# Patient Record
Sex: FEMALE | Race: WHITE | NOT HISPANIC OR LATINO | Employment: OTHER | URBAN - METROPOLITAN AREA
[De-identification: names, ages, dates, MRNs, and addresses within clinical notes are randomized per-mention and may not be internally consistent; named-entity substitution may affect disease eponyms.]

---

## 2017-01-03 ENCOUNTER — CONVERSION ENCOUNTER (OUTPATIENT)
Dept: OTHER | Facility: HOSPITAL | Age: 67
End: 2017-01-03

## 2017-03-20 ENCOUNTER — HOSPITAL ENCOUNTER (OUTPATIENT)
Dept: OTHER | Facility: HOSPITAL | Age: 67
Setting detail: SPECIMEN
Discharge: HOME | End: 2017-03-20
Attending: FAMILY MEDICINE | Admitting: FAMILY MEDICINE

## 2017-03-20 LAB
ALBUMIN SERPL-MCNC: 4.7 G/DL (ref 3.5–4.8)
ALBUMIN/GLOB SERPL: 1.4 {RATIO} (ref 1–1.7)
ALP SERPL-CCNC: 91 IU/L (ref 32–91)
ALT SERPL-CCNC: 58 IU/L (ref 14–54)
ANION GAP SERPL CALC-SCNC: 16.3 MMOL/L (ref 10–20)
AST SERPL-CCNC: 32 IU/L (ref 15–41)
BASOPHILS # BLD AUTO: 0.1 10*3/UL (ref 0–0.2)
BASOPHILS NFR BLD AUTO: 1 % (ref 0–2)
BILIRUB SERPL-MCNC: 0.7 MG/DL (ref 0.3–1.2)
BUN SERPL-MCNC: 12 MG/DL (ref 8–20)
BUN/CREAT SERPL: 17.1 (ref 5.4–26.2)
CALCIUM SERPL-MCNC: 10.4 MG/DL (ref 8.9–10.3)
CHLORIDE SERPL-SCNC: 100 MMOL/L (ref 101–111)
CHOLEST SERPL-MCNC: 233 MG/DL
CHOLEST/HDLC SERPL: 2.6 {RATIO}
CONV CO2: 27 MMOL/L (ref 22–32)
CONV LDL CHOLESTEROL DIRECT: 125 MG/DL (ref 0–100)
CONV TOTAL PROTEIN: 8.1 G/DL (ref 6.1–7.9)
CREAT UR-MCNC: 0.7 MG/DL (ref 0.4–1)
DIFFERENTIAL METHOD BLD: (no result)
EOSINOPHIL # BLD AUTO: 0 10*3/UL (ref 0–0.3)
EOSINOPHIL # BLD AUTO: 1 % (ref 0–3)
ERYTHROCYTE [DISTWIDTH] IN BLOOD BY AUTOMATED COUNT: 12.7 % (ref 11.5–14.5)
GLOBULIN UR ELPH-MCNC: 3.4 G/DL (ref 2.5–3.8)
GLUCOSE SERPL-MCNC: 98 MG/DL (ref 65–99)
HCT VFR BLD AUTO: 42.7 % (ref 35–49)
HDLC SERPL-MCNC: 91 MG/DL
HGB BLD-MCNC: 14.4 G/DL (ref 12–15)
LDLC/HDLC SERPL: 1.4 {RATIO}
LIPID INTERPRETATION: ABNORMAL
LYMPHOCYTES # BLD AUTO: 2.2 10*3/UL (ref 0.8–4.8)
LYMPHOCYTES NFR BLD AUTO: 28 % (ref 18–42)
MCH RBC QN AUTO: 31.5 PG (ref 26–32)
MCHC RBC AUTO-ENTMCNC: 33.7 G/DL (ref 32–36)
MCV RBC AUTO: 93.5 FL (ref 80–94)
MONOCYTES # BLD AUTO: 0.6 10*3/UL (ref 0.1–1.3)
MONOCYTES NFR BLD AUTO: 8 % (ref 2–11)
NEUTROPHILS # BLD AUTO: 5.1 10*3/UL (ref 2.3–8.6)
NEUTROPHILS NFR BLD AUTO: 62 % (ref 50–75)
NRBC BLD AUTO-RTO: 0 /100{WBCS}
NRBC/RBC NFR BLD MANUAL: 0 10*3/UL
PLATELET # BLD AUTO: 333 10*3/UL (ref 150–450)
PMV BLD AUTO: 7.6 FL (ref 7.4–10.4)
POTASSIUM SERPL-SCNC: 4.3 MMOL/L (ref 3.6–5.1)
RBC # BLD AUTO: 4.57 10*6/UL (ref 4–5.4)
SODIUM SERPL-SCNC: 139 MMOL/L (ref 136–144)
T3 SERPL-MCNC: 0.89 NG/ML (ref 0.87–1.78)
T4 SERPL-MCNC: 8.57 UG/DL (ref 6.1–12.2)
TRIGL SERPL-MCNC: 62 MG/DL
VIT B12 SERPL-MCNC: 886 PG/ML (ref 180–914)
VLDLC SERPL CALC-MCNC: 16.9 MG/DL
WBC # BLD AUTO: 8.1 10*3/UL (ref 4.5–11.5)

## 2017-04-12 ENCOUNTER — CONVERSION ENCOUNTER (OUTPATIENT)
Dept: OTHER | Facility: HOSPITAL | Age: 67
End: 2017-04-12

## 2017-05-16 ENCOUNTER — HOSPITAL ENCOUNTER (OUTPATIENT)
Dept: PHYSICAL THERAPY | Facility: HOSPITAL | Age: 67
Setting detail: RECURRING SERIES
Discharge: HOME | End: 2017-06-21
Attending: FAMILY MEDICINE | Admitting: FAMILY MEDICINE

## 2017-10-09 ENCOUNTER — HOSPITAL ENCOUNTER (OUTPATIENT)
Dept: CT IMAGING | Facility: HOSPITAL | Age: 67
Discharge: HOME | End: 2017-10-09
Attending: FAMILY MEDICINE | Admitting: FAMILY MEDICINE

## 2017-10-09 LAB
ALBUMIN SERPL-MCNC: 4.2 G/DL (ref 3.5–4.8)
ALBUMIN/GLOB SERPL: 1.4 {RATIO} (ref 1–1.7)
ALP SERPL-CCNC: 66 IU/L (ref 32–91)
ALT SERPL-CCNC: 35 IU/L (ref 14–54)
ANION GAP SERPL CALC-SCNC: 12.5 MMOL/L (ref 10–20)
AST SERPL-CCNC: 25 IU/L (ref 15–41)
BASOPHILS # BLD AUTO: 0.1 10*3/UL (ref 0–0.2)
BASOPHILS NFR BLD AUTO: 1 % (ref 0–2)
BILIRUB SERPL-MCNC: 0.5 MG/DL (ref 0.3–1.2)
BUN SERPL-MCNC: 20 MG/DL (ref 8–20)
BUN/CREAT SERPL: 25 (ref 5.4–26.2)
CALCIUM SERPL-MCNC: 9.9 MG/DL (ref 8.9–10.3)
CHLORIDE SERPL-SCNC: 102 MMOL/L (ref 101–111)
CHOLEST SERPL-MCNC: 202 MG/DL
CHOLEST/HDLC SERPL: 2.5 {RATIO}
CONV CO2: 29 MMOL/L (ref 22–32)
CONV LDL CHOLESTEROL DIRECT: 107 MG/DL (ref 0–100)
CONV TOTAL PROTEIN: 7.1 G/DL (ref 6.1–7.9)
CREAT BLDA-MCNC: 0.7 MG/DL (ref 0.6–1.3)
CREAT UR-MCNC: 0.8 MG/DL (ref 0.4–1)
DIFFERENTIAL METHOD BLD: (no result)
EOSINOPHIL # BLD AUTO: 0.1 10*3/UL (ref 0–0.3)
EOSINOPHIL # BLD AUTO: 2 % (ref 0–3)
ERYTHROCYTE [DISTWIDTH] IN BLOOD BY AUTOMATED COUNT: 12.7 % (ref 11.5–14.5)
GLOBULIN UR ELPH-MCNC: 2.9 G/DL (ref 2.5–3.8)
GLUCOSE SERPL-MCNC: 105 MG/DL (ref 65–99)
HCT VFR BLD AUTO: 40.4 % (ref 35–49)
HDLC SERPL-MCNC: 81 MG/DL
HGB BLD-MCNC: 13.9 G/DL (ref 12–15)
LDLC/HDLC SERPL: 1.3 {RATIO}
LIPID INTERPRETATION: ABNORMAL
LYMPHOCYTES # BLD AUTO: 2.7 10*3/UL (ref 0.8–4.8)
LYMPHOCYTES NFR BLD AUTO: 38 % (ref 18–42)
MCH RBC QN AUTO: 32.9 PG (ref 26–32)
MCHC RBC AUTO-ENTMCNC: 34.5 G/DL (ref 32–36)
MCV RBC AUTO: 95.3 FL (ref 80–94)
MONOCYTES # BLD AUTO: 0.7 10*3/UL (ref 0.1–1.3)
MONOCYTES NFR BLD AUTO: 10 % (ref 2–11)
NEUTROPHILS # BLD AUTO: 3.5 10*3/UL (ref 2.3–8.6)
NEUTROPHILS NFR BLD AUTO: 49 % (ref 50–75)
NRBC BLD AUTO-RTO: 0 /100{WBCS}
NRBC/RBC NFR BLD MANUAL: 0 10*3/UL
PLATELET # BLD AUTO: 279 10*3/UL (ref 150–450)
PMV BLD AUTO: 7.1 FL (ref 7.4–10.4)
POTASSIUM SERPL-SCNC: 4.5 MMOL/L (ref 3.6–5.1)
RBC # BLD AUTO: 4.24 10*6/UL (ref 4–5.4)
SODIUM SERPL-SCNC: 139 MMOL/L (ref 136–144)
TRIGL SERPL-MCNC: 88 MG/DL
VLDLC SERPL CALC-MCNC: 13.8 MG/DL
WBC # BLD AUTO: 7.1 10*3/UL (ref 4.5–11.5)

## 2017-10-14 ENCOUNTER — HOSPITAL ENCOUNTER (OUTPATIENT)
Dept: MRI IMAGING | Facility: HOSPITAL | Age: 67
Discharge: HOME | End: 2017-10-14
Attending: FAMILY MEDICINE | Admitting: FAMILY MEDICINE

## 2017-11-01 ENCOUNTER — OFFICE (AMBULATORY)
Dept: URBAN - METROPOLITAN AREA CLINIC 64 | Facility: CLINIC | Age: 67
End: 2017-11-01

## 2017-11-01 VITALS
SYSTOLIC BLOOD PRESSURE: 150 MMHG | HEART RATE: 75 BPM | DIASTOLIC BLOOD PRESSURE: 71 MMHG | HEIGHT: 65 IN | WEIGHT: 160 LBS

## 2017-11-01 DIAGNOSIS — R15.9 FULL INCONTINENCE OF FECES: ICD-10-CM

## 2017-11-01 DIAGNOSIS — K62.5 HEMORRHAGE OF ANUS AND RECTUM: ICD-10-CM

## 2017-11-01 DIAGNOSIS — Z80.9 FAMILY HISTORY OF MALIGNANT NEOPLASM, UNSPECIFIED: ICD-10-CM

## 2017-11-01 DIAGNOSIS — K59.1 FUNCTIONAL DIARRHEA: ICD-10-CM

## 2017-11-01 PROCEDURE — 99203 OFFICE O/P NEW LOW 30 MIN: CPT | Performed by: INTERNAL MEDICINE

## 2017-11-01 RX ORDER — COLESTIPOL HYDROCHLORIDE 1 G/1
1 TABLET, FILM COATED ORAL
Qty: 30 | Refills: 11 | Status: COMPLETED
Start: 2017-11-01 | End: 2018-03-16

## 2018-01-03 ENCOUNTER — OFFICE (AMBULATORY)
Dept: URBAN - METROPOLITAN AREA CLINIC 64 | Facility: CLINIC | Age: 68
End: 2018-01-03
Payer: COMMERCIAL

## 2018-01-03 VITALS
HEIGHT: 65 IN | SYSTOLIC BLOOD PRESSURE: 182 MMHG | WEIGHT: 159 LBS | DIASTOLIC BLOOD PRESSURE: 92 MMHG | HEART RATE: 79 BPM

## 2018-01-03 DIAGNOSIS — K62.5 HEMORRHAGE OF ANUS AND RECTUM: ICD-10-CM

## 2018-01-03 DIAGNOSIS — K21.9 GASTRO-ESOPHAGEAL REFLUX DISEASE WITHOUT ESOPHAGITIS: ICD-10-CM

## 2018-01-03 DIAGNOSIS — K59.1 FUNCTIONAL DIARRHEA: ICD-10-CM

## 2018-01-03 DIAGNOSIS — R15.9 FULL INCONTINENCE OF FECES: ICD-10-CM

## 2018-01-03 LAB
C-REACTIVE PROTEIN, QUANT: 0.8 MG/L (ref 0–4.9)
CBC WITH DIFFERENTIAL/PLATELET: BASO (ABSOLUTE): 0 X10E3/UL (ref 0–0.2)
CBC WITH DIFFERENTIAL/PLATELET: BASOS: 0 %
CBC WITH DIFFERENTIAL/PLATELET: EOS (ABSOLUTE): 0.1 X10E3/UL (ref 0–0.4)
CBC WITH DIFFERENTIAL/PLATELET: EOS: 1 %
CBC WITH DIFFERENTIAL/PLATELET: HEMATOCRIT: 37.2 % (ref 34–46.6)
CBC WITH DIFFERENTIAL/PLATELET: HEMATOLOGY COMMENTS: (no result)
CBC WITH DIFFERENTIAL/PLATELET: HEMOGLOBIN: 12.4 G/DL (ref 11.1–15.9)
CBC WITH DIFFERENTIAL/PLATELET: IMMATURE CELLS: (no result)
CBC WITH DIFFERENTIAL/PLATELET: IMMATURE GRANS (ABS): 0 X10E3/UL (ref 0–0.1)
CBC WITH DIFFERENTIAL/PLATELET: IMMATURE GRANULOCYTES: 0 %
CBC WITH DIFFERENTIAL/PLATELET: LYMPHS (ABSOLUTE): 1.7 X10E3/UL (ref 0.7–3.1)
CBC WITH DIFFERENTIAL/PLATELET: LYMPHS: 23 %
CBC WITH DIFFERENTIAL/PLATELET: MCH: 31.7 PG (ref 26.6–33)
CBC WITH DIFFERENTIAL/PLATELET: MCHC: 33.3 G/DL (ref 31.5–35.7)
CBC WITH DIFFERENTIAL/PLATELET: MCV: 95 FL (ref 79–97)
CBC WITH DIFFERENTIAL/PLATELET: MONOCYTES(ABSOLUTE): 1 X10E3/UL — HIGH (ref 0.1–0.9)
CBC WITH DIFFERENTIAL/PLATELET: MONOCYTES: 14 %
CBC WITH DIFFERENTIAL/PLATELET: NEUTROPHILS (ABSOLUTE): 4.6 X10E3/UL (ref 1.4–7)
CBC WITH DIFFERENTIAL/PLATELET: NEUTROPHILS: 62 %
CBC WITH DIFFERENTIAL/PLATELET: NRBC: (no result)
CBC WITH DIFFERENTIAL/PLATELET: PLATELETS: 334 X10E3/UL (ref 150–379)
CBC WITH DIFFERENTIAL/PLATELET: RBC: 3.91 X10E6/UL (ref 3.77–5.28)
CBC WITH DIFFERENTIAL/PLATELET: RDW: 13.7 % (ref 12.3–15.4)
CBC WITH DIFFERENTIAL/PLATELET: WBC: 7.4 X10E3/UL (ref 3.4–10.8)
CELIAC DISEASE COMPREHENSIVE: DEAMIDATED GLIADIN ABS, IGA: 6 UNITS (ref 0–19)
CELIAC DISEASE COMPREHENSIVE: DEAMIDATED GLIADIN ABS, IGG: 3 UNITS (ref 0–19)
CELIAC DISEASE COMPREHENSIVE: ENDOMYSIAL ANTIBODY IGA: NEGATIVE
CELIAC DISEASE COMPREHENSIVE: IMMUNOGLOBULIN A, QN, SERUM: 224 MG/DL (ref 87–352)
CELIAC DISEASE COMPREHENSIVE: T-TRANSGLUTAMINASE (TTG) IGA: <2 U/ML
CELIAC DISEASE COMPREHENSIVE: T-TRANSGLUTAMINASE (TTG) IGG: <2 U/ML
COMP. METABOLIC PANEL (14): A/G RATIO: 1.7 (ref 1.2–2.2)
COMP. METABOLIC PANEL (14): ALBUMIN, SERUM: 4.3 G/DL (ref 3.6–4.8)
COMP. METABOLIC PANEL (14): ALKALINE PHOSPHATASE, S: 97 IU/L (ref 39–117)
COMP. METABOLIC PANEL (14): ALT (SGPT): 35 IU/L — HIGH (ref 0–32)
COMP. METABOLIC PANEL (14): AST (SGOT): 27 IU/L (ref 0–40)
COMP. METABOLIC PANEL (14): BILIRUBIN, TOTAL: 0.2 MG/DL (ref 0–1.2)
COMP. METABOLIC PANEL (14): BUN/CREATININE RATIO: 29 — HIGH (ref 12–28)
COMP. METABOLIC PANEL (14): BUN: 24 MG/DL (ref 8–27)
COMP. METABOLIC PANEL (14): CALCIUM, SERUM: 10.1 MG/DL (ref 8.7–10.3)
COMP. METABOLIC PANEL (14): CARBON DIOXIDE, TOTAL: 29 MMOL/L (ref 18–29)
COMP. METABOLIC PANEL (14): CHLORIDE, SERUM: 100 MMOL/L (ref 96–106)
COMP. METABOLIC PANEL (14): CREATININE, SERUM: 0.82 MG/DL (ref 0.57–1)
COMP. METABOLIC PANEL (14): EGFR IF AFRICN AM: 87 ML/MIN/1.73 (ref 59–?)
COMP. METABOLIC PANEL (14): EGFR IF NONAFRICN AM: 75 ML/MIN/1.73 (ref 59–?)
COMP. METABOLIC PANEL (14): GLOBULIN, TOTAL: 2.6 G/DL (ref 1.5–4.5)
COMP. METABOLIC PANEL (14): GLUCOSE, SERUM: 96 MG/DL (ref 65–99)
COMP. METABOLIC PANEL (14): POTASSIUM, SERUM: 4.4 MMOL/L (ref 3.5–5.2)
COMP. METABOLIC PANEL (14): PROTEIN, TOTAL, SERUM: 6.9 G/DL (ref 6–8.5)
COMP. METABOLIC PANEL (14): SODIUM, SERUM: 142 MMOL/L (ref 134–144)
SEDIMENTATION RATE-WESTERGREN: 6 MM/HR (ref 0–40)

## 2018-01-03 PROCEDURE — 99214 OFFICE O/P EST MOD 30 MIN: CPT | Performed by: INTERNAL MEDICINE

## 2018-01-03 RX ORDER — MESALAMINE 375 MG/1
CAPSULE, EXTENDED RELEASE ORAL
Qty: 120 | Refills: 11 | Status: COMPLETED
End: 2018-09-05

## 2018-01-03 RX ORDER — CIPROFLOXACIN 500 MG/1
1000 TABLET, FILM COATED ORAL
Qty: 20 | Refills: 0 | Status: COMPLETED
Start: 2018-01-03 | End: 2018-03-16

## 2018-01-03 RX ORDER — PREDNISONE 5 MG/1
TABLET ORAL
Qty: 150 | Refills: 0 | Status: COMPLETED
Start: 2018-01-03 | End: 2018-03-16

## 2018-01-03 RX ORDER — METRONIDAZOLE 500 MG/1
1500 TABLET, FILM COATED ORAL
Qty: 30 | Refills: 0 | Status: COMPLETED
Start: 2018-01-03 | End: 2018-03-16

## 2018-01-09 ENCOUNTER — OFFICE (AMBULATORY)
Dept: URBAN - METROPOLITAN AREA LAB 2 | Facility: LAB | Age: 68
End: 2018-01-09
Payer: MEDICARE

## 2018-01-09 DIAGNOSIS — R15.9 FULL INCONTINENCE OF FECES: ICD-10-CM

## 2018-01-09 DIAGNOSIS — K59.1 FUNCTIONAL DIARRHEA: ICD-10-CM

## 2018-01-09 PROCEDURE — 87999 UNLISTED MICROBIOLOGY PX: CPT | Performed by: INTERNAL MEDICINE

## 2018-03-16 ENCOUNTER — HOSPITAL ENCOUNTER (OUTPATIENT)
Dept: LAB | Facility: HOSPITAL | Age: 68
Discharge: HOME | End: 2018-03-16
Attending: INTERNAL MEDICINE | Admitting: INTERNAL MEDICINE

## 2018-03-16 ENCOUNTER — OFFICE (AMBULATORY)
Dept: URBAN - METROPOLITAN AREA CLINIC 64 | Facility: CLINIC | Age: 68
End: 2018-03-16
Payer: COMMERCIAL

## 2018-03-16 VITALS
WEIGHT: 157 LBS | HEART RATE: 707 BPM | DIASTOLIC BLOOD PRESSURE: 79 MMHG | HEIGHT: 65 IN | SYSTOLIC BLOOD PRESSURE: 151 MMHG

## 2018-03-16 DIAGNOSIS — R15.9 FULL INCONTINENCE OF FECES: ICD-10-CM

## 2018-03-16 DIAGNOSIS — K62.5 HEMORRHAGE OF ANUS AND RECTUM: ICD-10-CM

## 2018-03-16 DIAGNOSIS — K59.1 FUNCTIONAL DIARRHEA: ICD-10-CM

## 2018-03-16 PROCEDURE — 99214 OFFICE O/P EST MOD 30 MIN: CPT | Performed by: INTERNAL MEDICINE

## 2018-03-16 RX ORDER — COLESTIPOL HYDROCHLORIDE 1 G/1
2 TABLET, FILM COATED ORAL
Qty: 60 | Refills: 11 | Status: COMPLETED
Start: 2018-03-16 | End: 2018-07-30

## 2018-03-16 RX ORDER — HYDROCORTISONE ACETATE 25 MG/1
25 SUPPOSITORY RECTAL
Qty: 14 | Refills: 1 | Status: COMPLETED
Start: 2018-03-16 | End: 2018-04-27

## 2018-04-18 ENCOUNTER — ON CAMPUS - OUTPATIENT (AMBULATORY)
Dept: URBAN - METROPOLITAN AREA HOSPITAL 2 | Facility: HOSPITAL | Age: 68
End: 2018-04-18
Payer: COMMERCIAL

## 2018-04-18 ENCOUNTER — HOSPITAL ENCOUNTER (OUTPATIENT)
Dept: OTHER | Facility: HOSPITAL | Age: 68
Setting detail: SPECIMEN
Discharge: HOME | End: 2018-04-18
Attending: INTERNAL MEDICINE | Admitting: INTERNAL MEDICINE

## 2018-04-18 ENCOUNTER — OFFICE (AMBULATORY)
Dept: URBAN - METROPOLITAN AREA PATHOLOGY 4 | Facility: PATHOLOGY | Age: 68
End: 2018-04-18
Payer: COMMERCIAL

## 2018-04-18 VITALS
OXYGEN SATURATION: 92 % | HEART RATE: 69 BPM | OXYGEN SATURATION: 95 % | SYSTOLIC BLOOD PRESSURE: 153 MMHG | WEIGHT: 155 LBS | HEART RATE: 61 BPM | DIASTOLIC BLOOD PRESSURE: 88 MMHG | SYSTOLIC BLOOD PRESSURE: 129 MMHG | HEART RATE: 66 BPM | TEMPERATURE: 97.8 F | DIASTOLIC BLOOD PRESSURE: 57 MMHG | HEART RATE: 78 BPM | DIASTOLIC BLOOD PRESSURE: 56 MMHG | OXYGEN SATURATION: 94 % | RESPIRATION RATE: 15 BRPM | DIASTOLIC BLOOD PRESSURE: 66 MMHG | RESPIRATION RATE: 18 BRPM | SYSTOLIC BLOOD PRESSURE: 197 MMHG | SYSTOLIC BLOOD PRESSURE: 106 MMHG | SYSTOLIC BLOOD PRESSURE: 154 MMHG | OXYGEN SATURATION: 97 % | HEART RATE: 81 BPM | RESPIRATION RATE: 16 BRPM | DIASTOLIC BLOOD PRESSURE: 98 MMHG | OXYGEN SATURATION: 98 % | HEART RATE: 70 BPM | SYSTOLIC BLOOD PRESSURE: 109 MMHG | DIASTOLIC BLOOD PRESSURE: 83 MMHG | SYSTOLIC BLOOD PRESSURE: 107 MMHG | HEART RATE: 67 BPM | DIASTOLIC BLOOD PRESSURE: 59 MMHG | HEIGHT: 65 IN

## 2018-04-18 DIAGNOSIS — K59.1 FUNCTIONAL DIARRHEA: ICD-10-CM

## 2018-04-18 DIAGNOSIS — K64.8 OTHER HEMORRHOIDS: ICD-10-CM

## 2018-04-18 DIAGNOSIS — K57.30 DIVERTICULOSIS OF LARGE INTESTINE WITHOUT PERFORATION OR ABS: ICD-10-CM

## 2018-04-18 LAB
GI HISTOLOGY: A. SELECT: (no result)
GI HISTOLOGY: B. SELECT: (no result)
GI HISTOLOGY: PDF REPORT: (no result)

## 2018-04-18 PROCEDURE — 88305 TISSUE EXAM BY PATHOLOGIST: CPT | Mod: 26 | Performed by: INTERNAL MEDICINE

## 2018-04-18 PROCEDURE — 45380 COLONOSCOPY AND BIOPSY: CPT | Performed by: INTERNAL MEDICINE

## 2018-04-18 RX ADMIN — PROPOFOL: 10 INJECTION, EMULSION INTRAVENOUS at 10:05

## 2018-04-18 NOTE — SERVICENOTES
There is a very high clinical suspicion of IBD, but the ileocolonoscopy today was normal. Await bx results. Cont Apriso and Colestipol for now.

## 2018-04-18 NOTE — SERVICEHPINOTES
Lab:  3/18 - IBD serology:  +UC colitisBRDiarrhea somewhat improved with BID colestipol.  LOKI ANDERSON  is a  68  female   who presents today for a  Colonoscopy   for   the indications listed below. The updated Patient Profile was reviewed prior to the procedure, in conjunction with the Physical Exam, including medical conditions, surgical procedures, medications, allergies, family history and social history. See Physical Exam time stamp below for date and time of HPI completion.Pre-operatively, I reviewed the indication(s) for the procedure, the risks of the procedure [including but not limited to: unexpected bleeding possibly requiring hospitalization and/or unplanned repeat procedures, perforation possibly requiring surgical treatment, missed lesions and complications of sedation/MAC (also explained by anesthesia staff)]. I have evaluated the patient for risks associated with the planned anesthesia and the procedure to be performed and find the patient an acceptable candidate for IV sedation.Multiple opportunities were provided for any questions or concerns, and all questions were answered satisfactorily before any anesthesia was administered. We will proceed with the planned procedure.BR

## 2018-04-27 ENCOUNTER — OFFICE (AMBULATORY)
Dept: URBAN - METROPOLITAN AREA CLINIC 64 | Facility: CLINIC | Age: 68
End: 2018-04-27

## 2018-04-27 VITALS
HEART RATE: 74 BPM | WEIGHT: 156 LBS | SYSTOLIC BLOOD PRESSURE: 158 MMHG | HEIGHT: 65 IN | DIASTOLIC BLOOD PRESSURE: 80 MMHG

## 2018-04-27 DIAGNOSIS — K64.8 OTHER HEMORRHOIDS: ICD-10-CM

## 2018-04-27 DIAGNOSIS — K59.1 FUNCTIONAL DIARRHEA: ICD-10-CM

## 2018-04-27 PROCEDURE — 99213 OFFICE O/P EST LOW 20 MIN: CPT | Performed by: INTERNAL MEDICINE

## 2018-04-27 RX ORDER — COLESTIPOL HYDROCHLORIDE 1 G/1
TABLET, FILM COATED ORAL
Qty: 90 | Refills: 11 | Status: COMPLETED
Start: 2018-04-27 | End: 2018-10-31

## 2018-07-30 ENCOUNTER — OFFICE (AMBULATORY)
Dept: URBAN - METROPOLITAN AREA CLINIC 64 | Facility: CLINIC | Age: 68
End: 2018-07-30
Payer: COMMERCIAL

## 2018-07-30 VITALS
HEART RATE: 69 BPM | HEIGHT: 65 IN | DIASTOLIC BLOOD PRESSURE: 80 MMHG | WEIGHT: 159 LBS | SYSTOLIC BLOOD PRESSURE: 155 MMHG

## 2018-07-30 DIAGNOSIS — K62.5 HEMORRHAGE OF ANUS AND RECTUM: ICD-10-CM

## 2018-07-30 DIAGNOSIS — K59.1 FUNCTIONAL DIARRHEA: ICD-10-CM

## 2018-07-30 DIAGNOSIS — K21.9 GASTRO-ESOPHAGEAL REFLUX DISEASE WITHOUT ESOPHAGITIS: ICD-10-CM

## 2018-07-30 PROCEDURE — 99213 OFFICE O/P EST LOW 20 MIN: CPT | Performed by: INTERNAL MEDICINE

## 2018-07-30 RX ORDER — RANITIDINE 150 MG/1
150 TABLET ORAL
Qty: 30 | Refills: 11 | Status: COMPLETED
Start: 2018-07-30 | End: 2019-02-01

## 2018-09-11 ENCOUNTER — HOSPITAL ENCOUNTER (OUTPATIENT)
Dept: LAB | Facility: HOSPITAL | Age: 68
Discharge: HOME | End: 2018-09-11
Attending: FAMILY MEDICINE | Admitting: FAMILY MEDICINE

## 2018-09-11 LAB
ALBUMIN SERPL-MCNC: 3.9 G/DL (ref 3.5–4.8)
ALBUMIN/GLOB SERPL: 1.4 {RATIO} (ref 1–1.7)
ALP SERPL-CCNC: 62 IU/L (ref 32–91)
ALT SERPL-CCNC: 36 IU/L (ref 14–54)
ANION GAP SERPL CALC-SCNC: 11 MMOL/L (ref 10–20)
AST SERPL-CCNC: 28 IU/L (ref 15–41)
BASOPHILS # BLD AUTO: 0 10*3/UL (ref 0–0.2)
BASOPHILS NFR BLD AUTO: 1 % (ref 0–2)
BILIRUB SERPL-MCNC: 0.5 MG/DL (ref 0.3–1.2)
BUN SERPL-MCNC: 15 MG/DL (ref 8–20)
BUN/CREAT SERPL: 25 (ref 5.4–26.2)
CALCIUM SERPL-MCNC: 9.2 MG/DL (ref 8.9–10.3)
CHLORIDE SERPL-SCNC: 104 MMOL/L (ref 101–111)
CHOLEST SERPL-MCNC: 201 MG/DL
CHOLEST/HDLC SERPL: 2.5 {RATIO}
CONV CO2: 28 MMOL/L (ref 22–32)
CONV LDL CHOLESTEROL DIRECT: 117 MG/DL (ref 0–100)
CONV TOTAL PROTEIN: 6.6 G/DL (ref 6.1–7.9)
CREAT UR-MCNC: 0.6 MG/DL (ref 0.4–1)
DIFFERENTIAL METHOD BLD: (no result)
EOSINOPHIL # BLD AUTO: 0.1 10*3/UL (ref 0–0.3)
EOSINOPHIL # BLD AUTO: 2 % (ref 0–3)
ERYTHROCYTE [DISTWIDTH] IN BLOOD BY AUTOMATED COUNT: 12.8 % (ref 11.5–14.5)
GLOBULIN UR ELPH-MCNC: 2.7 G/DL (ref 2.5–3.8)
GLUCOSE SERPL-MCNC: 111 MG/DL (ref 65–99)
HCT VFR BLD AUTO: 38.8 % (ref 35–49)
HDLC SERPL-MCNC: 80 MG/DL
HGB BLD-MCNC: 13.1 G/DL (ref 12–15)
LDLC/HDLC SERPL: 1.5 {RATIO}
LIPID INTERPRETATION: ABNORMAL
LYMPHOCYTES # BLD AUTO: 1.5 10*3/UL (ref 0.8–4.8)
LYMPHOCYTES NFR BLD AUTO: 31 % (ref 18–42)
MCH RBC QN AUTO: 32.7 PG (ref 26–32)
MCHC RBC AUTO-ENTMCNC: 33.8 G/DL (ref 32–36)
MCV RBC AUTO: 96.7 FL (ref 80–94)
MONOCYTES # BLD AUTO: 0.5 10*3/UL (ref 0.1–1.3)
MONOCYTES NFR BLD AUTO: 10 % (ref 2–11)
NEUTROPHILS # BLD AUTO: 2.8 10*3/UL (ref 2.3–8.6)
NEUTROPHILS NFR BLD AUTO: 56 % (ref 50–75)
NRBC BLD AUTO-RTO: 0 /100{WBCS}
NRBC/RBC NFR BLD MANUAL: 0 10*3/UL
PLATELET # BLD AUTO: 246 10*3/UL (ref 150–450)
PMV BLD AUTO: 7.2 FL (ref 7.4–10.4)
POTASSIUM SERPL-SCNC: 4 MMOL/L (ref 3.6–5.1)
RBC # BLD AUTO: 4.01 10*6/UL (ref 4–5.4)
SODIUM SERPL-SCNC: 139 MMOL/L (ref 136–144)
TRIGL SERPL-MCNC: 45 MG/DL
VLDLC SERPL CALC-MCNC: 4 MG/DL
WBC # BLD AUTO: 4.9 10*3/UL (ref 4.5–11.5)

## 2018-10-31 ENCOUNTER — OFFICE (AMBULATORY)
Dept: URBAN - METROPOLITAN AREA CLINIC 64 | Facility: CLINIC | Age: 68
End: 2018-10-31
Payer: COMMERCIAL

## 2018-10-31 VITALS
WEIGHT: 162 LBS | HEART RATE: 69 BPM | HEIGHT: 65 IN | SYSTOLIC BLOOD PRESSURE: 142 MMHG | DIASTOLIC BLOOD PRESSURE: 77 MMHG

## 2018-10-31 DIAGNOSIS — R10.32 LEFT LOWER QUADRANT PAIN: ICD-10-CM

## 2018-10-31 DIAGNOSIS — K59.1 FUNCTIONAL DIARRHEA: ICD-10-CM

## 2018-10-31 DIAGNOSIS — K21.9 GASTRO-ESOPHAGEAL REFLUX DISEASE WITHOUT ESOPHAGITIS: ICD-10-CM

## 2018-10-31 DIAGNOSIS — K51.50 LEFT SIDED COLITIS WITHOUT COMPLICATIONS: ICD-10-CM

## 2018-10-31 PROCEDURE — 99214 OFFICE O/P EST MOD 30 MIN: CPT | Performed by: INTERNAL MEDICINE

## 2018-10-31 RX ORDER — COLESTIPOL HYDROCHLORIDE 1 G/1
TABLET, FILM COATED ORAL
Qty: 90 | Refills: 11 | Status: COMPLETED
Start: 2018-04-27 | End: 2018-10-31

## 2018-10-31 RX ORDER — SULFASALAZINE 500 MG/1
2000 TABLET ORAL
Qty: 360 | Refills: 3 | Status: ACTIVE
Start: 2018-10-31

## 2018-10-31 RX ORDER — OMEPRAZOLE 20 MG/1
CAPSULE, DELAYED RELEASE ORAL
Qty: 90 | Refills: 3 | Status: COMPLETED
End: 2024-01-09

## 2019-02-01 ENCOUNTER — OFFICE (AMBULATORY)
Dept: URBAN - METROPOLITAN AREA CLINIC 64 | Facility: CLINIC | Age: 69
End: 2019-02-01
Payer: COMMERCIAL

## 2019-02-01 VITALS
DIASTOLIC BLOOD PRESSURE: 71 MMHG | HEART RATE: 82 BPM | WEIGHT: 168 LBS | HEIGHT: 65 IN | SYSTOLIC BLOOD PRESSURE: 133 MMHG

## 2019-02-01 DIAGNOSIS — K51.50 LEFT SIDED COLITIS WITHOUT COMPLICATIONS: ICD-10-CM

## 2019-02-01 DIAGNOSIS — K21.9 GASTRO-ESOPHAGEAL REFLUX DISEASE WITHOUT ESOPHAGITIS: ICD-10-CM

## 2019-02-01 PROCEDURE — 99213 OFFICE O/P EST LOW 20 MIN: CPT | Performed by: INTERNAL MEDICINE

## 2019-06-03 VITALS
WEIGHT: 157 LBS | HEART RATE: 72 BPM | BODY MASS INDEX: 26.13 KG/M2 | DIASTOLIC BLOOD PRESSURE: 108 MMHG | SYSTOLIC BLOOD PRESSURE: 159 MMHG | OXYGEN SATURATION: 96 % | DIASTOLIC BLOOD PRESSURE: 92 MMHG | SYSTOLIC BLOOD PRESSURE: 178 MMHG

## 2019-11-07 ENCOUNTER — OFFICE VISIT (OUTPATIENT)
Dept: FAMILY MEDICINE CLINIC | Facility: CLINIC | Age: 69
End: 2019-11-07

## 2019-11-07 ENCOUNTER — LAB (OUTPATIENT)
Dept: FAMILY MEDICINE CLINIC | Facility: CLINIC | Age: 69
End: 2019-11-07

## 2019-11-07 VITALS
BODY MASS INDEX: 28.39 KG/M2 | OXYGEN SATURATION: 92 % | TEMPERATURE: 98.6 F | WEIGHT: 170.4 LBS | HEART RATE: 84 BPM | DIASTOLIC BLOOD PRESSURE: 81 MMHG | SYSTOLIC BLOOD PRESSURE: 169 MMHG | HEIGHT: 65 IN

## 2019-11-07 DIAGNOSIS — Z11.59 NEED FOR HEPATITIS C SCREENING TEST: ICD-10-CM

## 2019-11-07 DIAGNOSIS — R06.09 DYSPNEA ON EXERTION: ICD-10-CM

## 2019-11-07 DIAGNOSIS — Z87.891 PERSONAL HISTORY OF NICOTINE DEPENDENCE: ICD-10-CM

## 2019-11-07 DIAGNOSIS — Z12.31 ENCOUNTER FOR SCREENING MAMMOGRAM FOR MALIGNANT NEOPLASM OF BREAST: ICD-10-CM

## 2019-11-07 DIAGNOSIS — I10 ESSENTIAL HYPERTENSION: Primary | ICD-10-CM

## 2019-11-07 DIAGNOSIS — Z12.2 ENCOUNTER FOR SCREENING FOR MALIGNANT NEOPLASM OF LUNG: ICD-10-CM

## 2019-11-07 LAB
CHOLEST SERPL-MCNC: 196 MG/DL (ref 0–200)
HCV AB SER DONR QL: NORMAL
HDLC SERPL-MCNC: 68 MG/DL (ref 40–60)
LDLC SERPL CALC-MCNC: 93 MG/DL (ref 0–100)
LDLC/HDLC SERPL: 1.36 {RATIO}
TRIGL SERPL-MCNC: 177 MG/DL (ref 0–150)
VLDLC SERPL-MCNC: 35.4 MG/DL

## 2019-11-07 PROCEDURE — 80061 LIPID PANEL: CPT | Performed by: FAMILY MEDICINE

## 2019-11-07 PROCEDURE — 99214 OFFICE O/P EST MOD 30 MIN: CPT | Performed by: FAMILY MEDICINE

## 2019-11-07 PROCEDURE — 36415 COLL VENOUS BLD VENIPUNCTURE: CPT | Performed by: FAMILY MEDICINE

## 2019-11-07 PROCEDURE — 86803 HEPATITIS C AB TEST: CPT | Performed by: FAMILY MEDICINE

## 2019-11-07 RX ORDER — OMEPRAZOLE 20 MG/1
1 CAPSULE, DELAYED RELEASE ORAL DAILY
COMMUNITY
Start: 2019-08-23

## 2019-11-07 RX ORDER — AMLODIPINE BESYLATE 5 MG/1
5 TABLET ORAL
Qty: 90 TABLET | Refills: 0 | Status: SHIPPED | OUTPATIENT
Start: 2019-11-07 | End: 2020-01-10

## 2019-11-07 RX ORDER — TRIAMCINOLONE ACETONIDE 1 MG/G
CREAM TOPICAL
COMMUNITY
Start: 2019-10-14 | End: 2021-06-11

## 2019-11-07 RX ORDER — SULFASALAZINE 500 MG/1
2 TABLET ORAL 2 TIMES DAILY
COMMUNITY
Start: 2019-08-09

## 2019-11-07 RX ORDER — ASCORBIC ACID 500 MG
500 TABLET ORAL DAILY
COMMUNITY
End: 2021-06-11

## 2019-11-07 RX ORDER — HYDRALAZINE HYDROCHLORIDE 10 MG/1
1 TABLET, FILM COATED ORAL DAILY
COMMUNITY
Start: 2019-10-16 | End: 2019-11-07

## 2019-11-07 RX ORDER — LISINOPRIL AND HYDROCHLOROTHIAZIDE 20; 12.5 MG/1; MG/1
2 TABLET ORAL
Qty: 180 TABLET | Refills: 1 | Status: SHIPPED | OUTPATIENT
Start: 2019-11-07 | End: 2020-05-15

## 2019-11-07 RX ORDER — ATORVASTATIN CALCIUM 10 MG/1
10 TABLET, FILM COATED ORAL DAILY
Refills: 0 | COMMUNITY
Start: 2019-09-01 | End: 2020-01-07 | Stop reason: SDUPTHER

## 2019-11-07 RX ORDER — MONTELUKAST SODIUM 10 MG/1
10 TABLET ORAL DAILY
COMMUNITY
Start: 2017-03-20 | End: 2022-03-16

## 2019-11-07 RX ORDER — CARVEDILOL 25 MG/1
2 TABLET ORAL 2 TIMES DAILY
COMMUNITY
Start: 2019-09-20 | End: 2019-11-07 | Stop reason: SDUPTHER

## 2019-11-07 RX ORDER — DIPHENHYDRAMINE HCL 25 MG
50 TABLET ORAL
COMMUNITY
End: 2021-06-11

## 2019-11-07 RX ORDER — TEMAZEPAM 30 MG/1
30 CAPSULE ORAL
COMMUNITY
End: 2020-07-10

## 2019-11-07 RX ORDER — LISINOPRIL AND HYDROCHLOROTHIAZIDE 20; 12.5 MG/1; MG/1
1 TABLET ORAL DAILY
COMMUNITY
Start: 2016-02-22 | End: 2019-11-07 | Stop reason: SDUPTHER

## 2019-11-07 RX ORDER — LISINOPRIL 20 MG/1
TABLET ORAL
COMMUNITY
Start: 2016-02-22 | End: 2019-11-07 | Stop reason: SDUPTHER

## 2019-11-07 RX ORDER — CITALOPRAM 20 MG/1
1 TABLET ORAL DAILY
COMMUNITY
Start: 2019-08-26 | End: 2019-11-18 | Stop reason: SDUPTHER

## 2019-11-07 RX ORDER — CARVEDILOL 25 MG/1
50 TABLET ORAL 2 TIMES DAILY WITH MEALS
Qty: 360 TABLET | Refills: 1 | Status: SHIPPED | OUTPATIENT
Start: 2019-11-07 | End: 2020-06-14

## 2019-11-19 RX ORDER — CITALOPRAM 20 MG/1
20 TABLET ORAL DAILY
Qty: 90 TABLET | Refills: 0 | Status: SHIPPED | OUTPATIENT
Start: 2019-11-19 | End: 2020-02-17

## 2019-12-03 ENCOUNTER — TELEPHONE (OUTPATIENT)
Dept: FAMILY MEDICINE CLINIC | Facility: CLINIC | Age: 69
End: 2019-12-03

## 2019-12-03 NOTE — TELEPHONE ENCOUNTER
Dr. Fermin was supposed to have put in a referral for a lung screening to be done at the cancer care, but she hasn't heard anything yet.

## 2019-12-04 NOTE — TELEPHONE ENCOUNTER
I had ordered an external screening mammogram and chest CT for patient at her last visit. Orders were placed as external orders to be completed at [facility] per patient request. I see where she had her mammogram done but did not have the chest CT done. Patient was asking about the CT.     Can we call [facility] to see if they received the order and then relay the message to patient.     Signature    Ana Fermin MD  Family Medicine  [facility]        This document has been electronically signed by Ana Fermin MD on December 4, 2019 7:18 AM

## 2019-12-04 NOTE — TELEPHONE ENCOUNTER
[facility] doesn't have order for chest CT. I spoke with Neida. There is a form that must be completed and sent to insurance first. Once this is completed then [facility] will call pt to schedule. I attempted to call pt but received vm. I asked her to please return call to discuss.

## 2019-12-05 NOTE — TELEPHONE ENCOUNTER
I spoke with Sadie regarding order for chest CT. Order has been sent to hospital to be completed at cancer care instead of [facility] due to insurance and cost reasons. Pt has been notified and told that someone should be calling her on Friday to schedule this. Per Dr. Fermin pt can cancel her 12/06/19 appt and reschedule for a time after she has chest CT done. Pt will cancel appt and call back to reschedule.

## 2020-01-06 ENCOUNTER — HOSPITAL ENCOUNTER (OUTPATIENT)
Dept: PET IMAGING | Facility: HOSPITAL | Age: 70
Discharge: HOME | End: 2020-01-06
Admitting: FAMILY MEDICINE

## 2020-01-06 DIAGNOSIS — Z87.891 PERSONAL HISTORY OF NICOTINE DEPENDENCE: ICD-10-CM

## 2020-01-06 DIAGNOSIS — Z12.2 ENCOUNTER FOR SCREENING FOR MALIGNANT NEOPLASM OF LUNG: ICD-10-CM

## 2020-01-06 PROCEDURE — G0297 LDCT FOR LUNG CA SCREEN: HCPCS

## 2020-01-07 RX ORDER — ATORVASTATIN CALCIUM 10 MG/1
10 TABLET, FILM COATED ORAL NIGHTLY
Qty: 30 TABLET | Refills: 5 | Status: SHIPPED | OUTPATIENT
Start: 2020-01-07 | End: 2020-02-24 | Stop reason: SDUPTHER

## 2020-01-10 ENCOUNTER — OFFICE VISIT (OUTPATIENT)
Dept: FAMILY MEDICINE CLINIC | Facility: CLINIC | Age: 70
End: 2020-01-10

## 2020-01-10 VITALS
HEIGHT: 65 IN | OXYGEN SATURATION: 95 % | TEMPERATURE: 98.5 F | WEIGHT: 173 LBS | DIASTOLIC BLOOD PRESSURE: 82 MMHG | HEART RATE: 67 BPM | SYSTOLIC BLOOD PRESSURE: 158 MMHG | BODY MASS INDEX: 28.82 KG/M2

## 2020-01-10 DIAGNOSIS — Z87.891 HISTORY OF TOBACCO USE: Primary | ICD-10-CM

## 2020-01-10 DIAGNOSIS — R91.1 NODULE OF UPPER LOBE OF LEFT LUNG: ICD-10-CM

## 2020-01-10 DIAGNOSIS — I10 ESSENTIAL HYPERTENSION: ICD-10-CM

## 2020-01-10 DIAGNOSIS — R06.02 SHORTNESS OF BREATH: ICD-10-CM

## 2020-01-10 PROBLEM — M51.37 DEGENERATION OF INTERVERTEBRAL DISC OF LUMBOSACRAL REGION: Status: ACTIVE | Noted: 2017-04-16

## 2020-01-10 PROBLEM — M54.9 CHRONIC BACK PAIN: Status: ACTIVE | Noted: 2017-04-12

## 2020-01-10 PROBLEM — R45.4 IRRITABILITY: Status: ACTIVE | Noted: 2017-03-20

## 2020-01-10 PROBLEM — Z98.890 STATUS POST LUMBAR SPINE OPERATION: Status: ACTIVE | Noted: 2017-04-17

## 2020-01-10 PROBLEM — G47.00 INSOMNIA: Status: ACTIVE | Noted: 2017-04-03

## 2020-01-10 PROBLEM — G89.29 CHRONIC BACK PAIN: Status: ACTIVE | Noted: 2017-04-12

## 2020-01-10 PROBLEM — M51.379 DEGENERATION OF INTERVERTEBRAL DISC OF LUMBOSACRAL REGION: Status: ACTIVE | Noted: 2017-04-16

## 2020-01-10 PROCEDURE — 99214 OFFICE O/P EST MOD 30 MIN: CPT | Performed by: FAMILY MEDICINE

## 2020-01-10 RX ORDER — AMLODIPINE BESYLATE 5 MG/1
10 TABLET ORAL
Qty: 90 TABLET | Refills: 0 | Status: SHIPPED | OUTPATIENT
Start: 2020-01-10 | End: 2020-01-20 | Stop reason: SDUPTHER

## 2020-01-20 ENCOUNTER — HOSPITAL ENCOUNTER (OUTPATIENT)
Dept: RESPIRATORY THERAPY | Facility: HOSPITAL | Age: 70
Discharge: HOME | End: 2020-01-20
Admitting: FAMILY MEDICINE

## 2020-01-20 VITALS
RESPIRATION RATE: 17 BRPM | OXYGEN SATURATION: 91 % | WEIGHT: 172.2 LBS | HEART RATE: 90 BPM | HEIGHT: 65 IN | BODY MASS INDEX: 28.69 KG/M2

## 2020-01-20 DIAGNOSIS — I10 ESSENTIAL HYPERTENSION: ICD-10-CM

## 2020-01-20 DIAGNOSIS — Z87.891 HISTORY OF TOBACCO USE: ICD-10-CM

## 2020-01-20 DIAGNOSIS — R06.02 SHORTNESS OF BREATH: ICD-10-CM

## 2020-01-20 PROCEDURE — 94729 DIFFUSING CAPACITY: CPT

## 2020-01-20 PROCEDURE — 94726 PLETHYSMOGRAPHY LUNG VOLUMES: CPT

## 2020-01-20 PROCEDURE — 94060 EVALUATION OF WHEEZING: CPT

## 2020-01-20 RX ORDER — AMLODIPINE BESYLATE 10 MG/1
10 TABLET ORAL
Qty: 90 TABLET | Refills: 1 | Status: SHIPPED | OUTPATIENT
Start: 2020-01-20 | End: 2020-07-13

## 2020-01-20 RX ORDER — ALBUTEROL SULFATE 2.5 MG/3ML
2.5 SOLUTION RESPIRATORY (INHALATION) ONCE
Status: COMPLETED | OUTPATIENT
Start: 2020-01-20 | End: 2020-01-20

## 2020-01-20 RX ADMIN — ALBUTEROL SULFATE 2.5 MG: 2.5 SOLUTION RESPIRATORY (INHALATION) at 14:45

## 2020-02-17 RX ORDER — CITALOPRAM 20 MG/1
20 TABLET ORAL DAILY
Qty: 90 TABLET | Refills: 1 | Status: SHIPPED | OUTPATIENT
Start: 2020-02-17 | End: 2020-08-24

## 2020-02-26 RX ORDER — ATORVASTATIN CALCIUM 10 MG/1
10 TABLET, FILM COATED ORAL NIGHTLY
Qty: 90 TABLET | Refills: 1 | Status: SHIPPED | OUTPATIENT
Start: 2020-02-26 | End: 2020-10-07 | Stop reason: SDUPTHER

## 2020-02-28 ENCOUNTER — OFFICE (AMBULATORY)
Dept: URBAN - METROPOLITAN AREA CLINIC 64 | Facility: CLINIC | Age: 70
End: 2020-02-28
Payer: COMMERCIAL

## 2020-02-28 VITALS
SYSTOLIC BLOOD PRESSURE: 154 MMHG | HEIGHT: 65 IN | HEART RATE: 84 BPM | WEIGHT: 175 LBS | DIASTOLIC BLOOD PRESSURE: 80 MMHG

## 2020-02-28 DIAGNOSIS — K21.9 GASTRO-ESOPHAGEAL REFLUX DISEASE WITHOUT ESOPHAGITIS: ICD-10-CM

## 2020-02-28 DIAGNOSIS — K64.8 OTHER HEMORRHOIDS: ICD-10-CM

## 2020-02-28 DIAGNOSIS — K51.50 LEFT SIDED COLITIS WITHOUT COMPLICATIONS: ICD-10-CM

## 2020-02-28 DIAGNOSIS — Z80.9 FAMILY HISTORY OF MALIGNANT NEOPLASM, UNSPECIFIED: ICD-10-CM

## 2020-02-28 PROCEDURE — 99213 OFFICE O/P EST LOW 20 MIN: CPT | Performed by: INTERNAL MEDICINE

## 2020-02-28 RX ORDER — SULFASALAZINE 500 MG/1
2000 TABLET ORAL
Qty: 360 | Refills: 3 | Status: ACTIVE
Start: 2018-10-31

## 2020-02-28 RX ORDER — FOLIC ACID 1 MG/1
1 TABLET ORAL
Qty: 90 | Refills: 3 | Status: ACTIVE
Start: 2020-02-28

## 2020-05-15 DIAGNOSIS — I10 ESSENTIAL HYPERTENSION: ICD-10-CM

## 2020-05-15 RX ORDER — LISINOPRIL AND HYDROCHLOROTHIAZIDE 20; 12.5 MG/1; MG/1
TABLET ORAL
Qty: 180 TABLET | Refills: 0 | Status: SHIPPED | OUTPATIENT
Start: 2020-05-15 | End: 2020-08-17

## 2020-06-13 DIAGNOSIS — I10 ESSENTIAL HYPERTENSION: ICD-10-CM

## 2020-06-14 RX ORDER — CARVEDILOL 25 MG/1
50 TABLET ORAL 2 TIMES DAILY WITH MEALS
Qty: 360 TABLET | Refills: 1 | Status: SHIPPED | OUTPATIENT
Start: 2020-06-14 | End: 2020-12-16

## 2020-06-17 PROBLEM — G47.33 OSA ON CPAP: Status: ACTIVE | Noted: 2020-06-17

## 2020-06-17 PROBLEM — Z99.89 OSA ON CPAP: Status: ACTIVE | Noted: 2020-06-17

## 2020-06-25 ENCOUNTER — HOSPITAL ENCOUNTER (OUTPATIENT)
Dept: PET IMAGING | Facility: HOSPITAL | Age: 70
Discharge: HOME | End: 2020-06-25
Admitting: FAMILY MEDICINE

## 2020-06-25 DIAGNOSIS — R91.1 NODULE OF UPPER LOBE OF LEFT LUNG: ICD-10-CM

## 2020-06-25 PROBLEM — K76.0 HEPATIC STEATOSIS: Status: ACTIVE | Noted: 2020-06-25

## 2020-06-25 PROBLEM — R91.8 MULTIPLE LUNG NODULES ON CT: Status: ACTIVE | Noted: 2020-06-25

## 2020-06-25 PROCEDURE — 71250 CT THORAX DX C-: CPT

## 2020-07-10 ENCOUNTER — OFFICE VISIT (OUTPATIENT)
Dept: FAMILY MEDICINE CLINIC | Facility: CLINIC | Age: 70
End: 2020-07-10

## 2020-07-10 ENCOUNTER — LAB (OUTPATIENT)
Dept: FAMILY MEDICINE CLINIC | Facility: CLINIC | Age: 70
End: 2020-07-10

## 2020-07-10 VITALS
WEIGHT: 172 LBS | DIASTOLIC BLOOD PRESSURE: 78 MMHG | HEIGHT: 65 IN | BODY MASS INDEX: 28.66 KG/M2 | TEMPERATURE: 97.1 F | HEART RATE: 87 BPM | SYSTOLIC BLOOD PRESSURE: 147 MMHG | OXYGEN SATURATION: 97 %

## 2020-07-10 DIAGNOSIS — K51.80 OTHER ULCERATIVE COLITIS WITHOUT COMPLICATION (HCC): Primary | ICD-10-CM

## 2020-07-10 DIAGNOSIS — K21.9 HIATAL HERNIA WITH GERD: ICD-10-CM

## 2020-07-10 DIAGNOSIS — G47.33 OSA ON CPAP: ICD-10-CM

## 2020-07-10 DIAGNOSIS — Z78.0 ENCOUNTER FOR OSTEOPOROSIS SCREENING IN ASYMPTOMATIC POSTMENOPAUSAL PATIENT: ICD-10-CM

## 2020-07-10 DIAGNOSIS — Z13.820 ENCOUNTER FOR OSTEOPOROSIS SCREENING IN ASYMPTOMATIC POSTMENOPAUSAL PATIENT: ICD-10-CM

## 2020-07-10 DIAGNOSIS — I10 ESSENTIAL HYPERTENSION: ICD-10-CM

## 2020-07-10 DIAGNOSIS — K44.9 HIATAL HERNIA WITH GERD: ICD-10-CM

## 2020-07-10 DIAGNOSIS — Z99.89 OSA ON CPAP: ICD-10-CM

## 2020-07-10 DIAGNOSIS — K75.81 NONALCOHOLIC STEATOHEPATITIS (NASH): ICD-10-CM

## 2020-07-10 DIAGNOSIS — E78.2 MULTIPLE-TYPE HYPERLIPIDEMIA: ICD-10-CM

## 2020-07-10 DIAGNOSIS — E55.9 VITAMIN D INSUFFICIENCY: ICD-10-CM

## 2020-07-10 DIAGNOSIS — Z00.00 ENCOUNTER FOR MEDICARE ANNUAL WELLNESS EXAM: ICD-10-CM

## 2020-07-10 PROBLEM — K51.90 ULCERATIVE COLITIS: Status: ACTIVE | Noted: 2020-07-10

## 2020-07-10 LAB
25(OH)D3 SERPL-MCNC: 67.9 NG/ML (ref 30–100)
ALBUMIN SERPL-MCNC: 4.4 G/DL (ref 3.5–5.2)
ALBUMIN/GLOB SERPL: 1.5 G/DL
ALP SERPL-CCNC: 66 U/L (ref 39–117)
ALT SERPL W P-5'-P-CCNC: 98 U/L (ref 1–33)
ANION GAP SERPL CALCULATED.3IONS-SCNC: 14.1 MMOL/L (ref 5–15)
AST SERPL-CCNC: 54 U/L (ref 1–32)
BASOPHILS # BLD AUTO: 0.03 10*3/MM3 (ref 0–0.2)
BASOPHILS NFR BLD AUTO: 0.5 % (ref 0–1.5)
BILIRUB SERPL-MCNC: 0.3 MG/DL (ref 0–1.2)
BUN SERPL-MCNC: 15 MG/DL (ref 8–23)
BUN/CREAT SERPL: 21.7 (ref 7–25)
CALCIUM SPEC-SCNC: 10.1 MG/DL (ref 8.6–10.5)
CHLORIDE SERPL-SCNC: 102 MMOL/L (ref 98–107)
CHOLEST SERPL-MCNC: 219 MG/DL (ref 0–200)
CK SERPL-CCNC: 80 U/L (ref 20–180)
CO2 SERPL-SCNC: 26.9 MMOL/L (ref 22–29)
CREAT SERPL-MCNC: 0.69 MG/DL (ref 0.57–1)
DEPRECATED RDW RBC AUTO: 43 FL (ref 37–54)
EOSINOPHIL # BLD AUTO: 0.05 10*3/MM3 (ref 0–0.4)
EOSINOPHIL NFR BLD AUTO: 0.8 % (ref 0.3–6.2)
ERYTHROCYTE [DISTWIDTH] IN BLOOD BY AUTOMATED COUNT: 12 % (ref 12.3–15.4)
GFR SERPL CREATININE-BSD FRML MDRD: 85 ML/MIN/1.73
GLOBULIN UR ELPH-MCNC: 3 GM/DL
GLUCOSE SERPL-MCNC: 99 MG/DL (ref 65–99)
HCT VFR BLD AUTO: 37.4 % (ref 34–46.6)
HDLC SERPL-MCNC: 71 MG/DL (ref 40–60)
HGB BLD-MCNC: 13.1 G/DL (ref 12–15.9)
IMM GRANULOCYTES # BLD AUTO: 0.01 10*3/MM3 (ref 0–0.05)
IMM GRANULOCYTES NFR BLD AUTO: 0.2 % (ref 0–0.5)
LDLC SERPL CALC-MCNC: 118 MG/DL (ref 0–100)
LDLC/HDLC SERPL: 1.66 {RATIO}
LYMPHOCYTES # BLD AUTO: 1.81 10*3/MM3 (ref 0.7–3.1)
LYMPHOCYTES NFR BLD AUTO: 29.7 % (ref 19.6–45.3)
MCH RBC QN AUTO: 33.9 PG (ref 26.6–33)
MCHC RBC AUTO-ENTMCNC: 35 G/DL (ref 31.5–35.7)
MCV RBC AUTO: 96.6 FL (ref 79–97)
MONOCYTES # BLD AUTO: 0.7 10*3/MM3 (ref 0.1–0.9)
MONOCYTES NFR BLD AUTO: 11.5 % (ref 5–12)
NEUTROPHILS NFR BLD AUTO: 3.49 10*3/MM3 (ref 1.7–7)
NEUTROPHILS NFR BLD AUTO: 57.3 % (ref 42.7–76)
NRBC BLD AUTO-RTO: 0.2 /100 WBC (ref 0–0.2)
PLATELET # BLD AUTO: 252 10*3/MM3 (ref 140–450)
PMV BLD AUTO: 9.3 FL (ref 6–12)
POTASSIUM SERPL-SCNC: 3.7 MMOL/L (ref 3.5–5.2)
PROT SERPL-MCNC: 7.4 G/DL (ref 6–8.5)
RBC # BLD AUTO: 3.87 10*6/MM3 (ref 3.77–5.28)
SODIUM SERPL-SCNC: 143 MMOL/L (ref 136–145)
TRIGL SERPL-MCNC: 150 MG/DL (ref 0–150)
VIT B12 BLD-MCNC: 717 PG/ML (ref 211–946)
VLDLC SERPL-MCNC: 30 MG/DL (ref 5–40)
WBC # BLD AUTO: 6.09 10*3/MM3 (ref 3.4–10.8)

## 2020-07-10 PROCEDURE — 82607 VITAMIN B-12: CPT | Performed by: FAMILY MEDICINE

## 2020-07-10 PROCEDURE — 80053 COMPREHEN METABOLIC PANEL: CPT | Performed by: FAMILY MEDICINE

## 2020-07-10 PROCEDURE — 85025 COMPLETE CBC W/AUTO DIFF WBC: CPT | Performed by: FAMILY MEDICINE

## 2020-07-10 PROCEDURE — 82306 VITAMIN D 25 HYDROXY: CPT | Performed by: FAMILY MEDICINE

## 2020-07-10 PROCEDURE — G0439 PPPS, SUBSEQ VISIT: HCPCS | Performed by: FAMILY MEDICINE

## 2020-07-10 PROCEDURE — 36415 COLL VENOUS BLD VENIPUNCTURE: CPT | Performed by: FAMILY MEDICINE

## 2020-07-10 PROCEDURE — 82550 ASSAY OF CK (CPK): CPT | Performed by: FAMILY MEDICINE

## 2020-07-10 PROCEDURE — 80061 LIPID PANEL: CPT | Performed by: FAMILY MEDICINE

## 2020-07-10 RX ORDER — TURMERIC ROOT EXTRACT 500 MG
TABLET ORAL
COMMUNITY
End: 2021-06-11

## 2020-07-10 RX ORDER — LANOLIN ALCOHOL/MO/W.PET/CERES
400 CREAM (GRAM) TOPICAL DAILY
COMMUNITY
End: 2021-01-11 | Stop reason: SDUPTHER

## 2020-07-10 NOTE — PROGRESS NOTES
The ABCs of the Annual Wellness Visit  Subsequent Medicare Wellness Visit    Chief Complaint   Patient presents with   • Medicare Wellness-subsequent       Subjective   History of Present Illness:  Meliza Red is a 70 y.o. female who presents for a Subsequent Medicare Wellness Visit.    HEALTH RISK ASSESSMENT    Recent Hospitalizations:  No hospitalization(s) within the last year.    Current Medical Providers:  Patient Care Team:  Ana Fermin MD as PCP - General (Family Medicine)  Vanda Sotomayor MD as PCP - Claims Attributed    Smoking Status:  Social History     Tobacco Use   Smoking Status Former Smoker   • Packs/day: 1.00   • Years: 30.00   • Pack years: 30.00   • Types: Cigarettes   • Last attempt to quit:    • Years since quittin.5   Smokeless Tobacco Never Used       Alcohol Consumption:  Social History     Substance and Sexual Activity   Alcohol Use Yes       Depression Screen:   PHQ-2/PHQ-9 Depression Screening 7/10/2020   Little interest or pleasure in doing things 0   Feeling down, depressed, or hopeless 0   Total Score 0       Fall Risk Screen:  JCADI Fall Risk Assessment was completed, and patient is at MODERATE risk for falls. Assessment completed on:7/10/2020    Health Habits and Functional and Cognitive Screening:  Functional & Cognitive Status 7/10/2020   Do you have difficulty preparing food and eating? No   Do you have difficulty bathing yourself, getting dressed or grooming yourself? No   Do you have difficulty using the toilet? Yes   Do you have difficulty moving around from place to place? No   Do you have trouble with steps or getting out of a bed or a chair? Yes   Current Diet Low Carb Diet   Dental Exam Up to date   Eye Exam Up to date   Exercise (times per week) 6 times per week   Current Exercise Activities Include Walking   Do you need help using the phone?  No   Are you deaf or do you have serious difficulty hearing?  No   Do you need help with  transportation? No   Do you need help shopping? No   Do you need help preparing meals?  No   Do you need help with housework?  No   Do you need help with laundry? No   Do you need help taking your medications? No   Do you need help managing money? No   Do you ever drive or ride in a car without wearing a seat belt? No   Have you felt unusual stress, anger or loneliness in the last month? No   Who do you live with? Spouse   If you need help, do you have trouble finding someone available to you? No   Have you been bothered in the last four weeks by sexual problems? No   Do you have difficulty concentrating, remembering or making decisions? No         Does the patient have evidence of cognitive impairment? No    Asprin use counseling:Does not need ASA (and currently is not on it)    Age-appropriate Screening Schedule:  Refer to the list below for future screening recommendations based on patient's age, sex and/or medical conditions. Orders for these recommended tests are listed in the plan section. The patient has been provided with a written plan.    Health Maintenance   Topic Date Due   • ZOSTER VACCINE (1 of 2) 01/14/2002   • INFLUENZA VACCINE  08/01/2020   • LIPID PANEL  11/07/2020   • MAMMOGRAM  11/12/2021   • TDAP/TD VACCINES (2 - Td) 08/05/2023   • COLONOSCOPY  04/18/2028          The following portions of the patient's history were reviewed and updated as appropriate: allergies, current medications, past family history, past medical history, past social history, past surgical history and problem list.    Outpatient Medications Prior to Visit   Medication Sig Dispense Refill   • Calcium Carbonate (CALCIUM 600 PO) Take  by mouth.     • folic acid (FOLVITE) 400 MCG tablet Take 400 mcg by mouth Daily.     • KRILL OIL PO Take  by mouth.     • Turmeric 500 MG tablet Take  by mouth.     • amLODIPine (NORVASC) 10 MG tablet Take 1 tablet by mouth Daily With Dinner. 90 tablet 1   • atorvastatin (LIPITOR) 10 MG tablet  Take 1 tablet by mouth Every Night. 90 tablet 1   • carvedilol (COREG) 25 MG tablet Take 2 tablets by mouth 2 (Two) Times a Day With Meals. 360 tablet 1   • Cholecalciferol (VITAMIN D3) 125 MCG (5000 UT) tablet tablet Take 1 tablet by mouth.     • citalopram (CeleXA) 20 MG tablet Take 1 tablet by mouth Daily. 90 tablet 1   • diphenhydrAMINE (BENADRYL) 25 MG tablet Take 50 mg by mouth.     • lisinopril-hydrochlorothiazide (PRINZIDE,ZESTORETIC) 20-12.5 MG per tablet TAKE TWO TABLETS BY MOUTH DAILY WITH BREAKFAST 180 tablet 0   • montelukast (SINGULAIR) 10 MG tablet Take 10 mg by mouth Daily.     • Multiple Vitamins-Minerals (MULTIVITAMIN WOMEN 50+) tablet MULTIVITAMIN WOMEN 50+ TABS     • omeprazole (priLOSEC) 20 MG capsule Take 1 capsule by mouth Daily.     • sulfaSALAzine (AZULFIDINE) 500 MG tablet Take 2 tablets by mouth 2 (Two) Times a Day.     • temazepam (RESTORIL) 30 MG capsule Take 30 mg by mouth.     • triamcinolone (KENALOG) 0.1 % cream      • vitamin C (ASCORBIC ACID) 500 MG tablet Take 500 mg by mouth Daily.     • vitamin E 100 UNIT capsule Take 400 Units by mouth Daily.       No facility-administered medications prior to visit.        Patient Active Problem List   Diagnosis   • Chronic back pain   • Degeneration of intervertebral disc of lumbosacral region   • Hiatal hernia with GERD   • Hypertension   • Insomnia   • Multiple-type hyperlipidemia   • Silicone leakage from breast implant   • Status post lumbar spine operation   • BEAR on CPAP   • Hepatic steatosis   • Multiple lung nodules on CT       Advanced Care Planning:  ACP discussion was held with the patient during this visit. Patient has an advance directive (not in EMR), copy requested.    Review of Systems    Compared to one year ago, the patient feels her physical health is the same.  Compared to one year ago, the patient feels her mental health is better.    Reviewed chart for potential of high risk medication in the elderly: yes  Reviewed chart  "for potential of harmful drug interactions in the elderly:yes    Objective         Vitals:    07/10/20 1403   BP: 147/78   BP Location: Left arm   Patient Position: Sitting   Cuff Size: Adult   Pulse: 87   Temp: 97.1 °F (36.2 °C)   TempSrc: Temporal   SpO2: 97%   Weight: 78 kg (172 lb)   Height: 165.1 cm (65\")       Body mass index is 28.62 kg/m².  Discussed the patient's BMI with her. The BMI is above average; BMI management plan is completed.    Physical Exam   Constitutional: She is oriented to person, place, and time. She appears well-developed and well-nourished. No distress.   HENT:   Head: Normocephalic and atraumatic.   Nose: Nose normal.   Mouth/Throat: Oropharynx is clear and moist.   Eyes: Pupils are equal, round, and reactive to light. Conjunctivae and EOM are normal. No scleral icterus.   Neck: Normal range of motion. Neck supple. No tracheal deviation present. No thyromegaly present.   Cardiovascular: Normal rate, regular rhythm, normal heart sounds and intact distal pulses.   Pulmonary/Chest: Effort normal and breath sounds normal. She has no wheezes. She has no rales.   Abdominal: Soft. Bowel sounds are normal. There is no tenderness.   Musculoskeletal: Normal range of motion. She exhibits no edema or tenderness.   Neurological: She is alert and oriented to person, place, and time. She has normal strength.   Skin: Skin is warm and dry. No rash noted. She is not diaphoretic.   Psychiatric: She has a normal mood and affect. Her behavior is normal.   Vitals reviewed.        Lab Results   Component Value Date    WBC 4.9 09/11/2018    HGB 13.1 09/11/2018    HCT 38.8 09/11/2018    MCV 96.7 (H) 09/11/2018     09/11/2018     Lab Results   Component Value Date    GLUCOSE 111 (H) 09/11/2018    BUN 15 09/11/2018    CREATININE 0.6 09/11/2018    EGFRIFAFRI >60 10/09/2017    BCR 25.0 09/11/2018    K 4.0 09/11/2018    CO2 28 09/11/2018    CALCIUM 9.2 09/11/2018    ALBUMIN 3.9 09/11/2018    LABIL2 1.4 " 09/11/2018    AST 28 09/11/2018    ALT 36 09/11/2018     Lab Results   Component Value Date    CHOL 196 11/07/2019    TRIG 177 (H) 11/07/2019    HDL 68 (H) 11/07/2019    LDL 93 11/07/2019     The 10-year ASCVD risk score (Claudia KHOURY Jr., et al., 2013) is: 12.4%    Values used to calculate the score:      Age: 70 years      Sex: Female      Is Non- : No      Diabetic: No      Tobacco smoker: No      Systolic Blood Pressure: 147 mmHg      Is BP treated: Yes      HDL Cholesterol: 68 mg/dL      Total Cholesterol: 196 mg/dL    Assessment/Plan   Medicare Risks and Personalized Health Plan  CMS Preventative Services Quick Reference  Advance Directive Discussion  Breast Cancer/Mammogram Screening  Cardiovascular risk  Colon Cancer Screening  Dementia/Memory   Depression/Dysphoria  Diabetic Lab Screening   Fall Risk  Immunizations Discussed/Encouraged (specific immunizations; Shingrix )  Lung Cancer Risk  Obesity/Overweight   Osteoprorosis Risk    The above risks/problems have been discussed with the patient.  Pertinent information has been shared with the patient in the After Visit Summary.  Follow up plans and orders are seen below in the Assessment/Plan Section.    Diagnoses and all orders for this visit:    1. Other ulcerative colitis without complication (CMS/HCC) (Primary)  Comments:  Currently stable on sulfasalazine 1000 mg twice daily.  Followed by GI.  Orders:  -     CBC Auto Differential  -     Comprehensive Metabolic Panel  -     Vitamin B12  -     Vitamin D 25 Hydroxy    2. Hiatal hernia with GERD  Comments:  Encourage GERD appropriate diet.  Continue PPI.  Orders:  -     CBC Auto Differential  -     Comprehensive Metabolic Panel    3. Nonalcoholic steatohepatitis (GARCIA)  Comments:  Discussed pathophysiology.  Encouraged weight management.  Orders:  -     CBC Auto Differential  -     Comprehensive Metabolic Panel  -     Vitamin D 25 Hydroxy    4. Essential hypertension  Comments:  BP goal  <140/90.  Encouraged low-Na+ diet & 150 minutes exercise weekly.   Continue amlodipine 10 mg, Coreg 50 mg BID, & lisinopril-HCTZ 40-25 mg.  Orders:  -     CBC Auto Differential  -     Comprehensive Metabolic Panel  -     Lipid Panel    5. Multiple-type hyperlipidemia  Comments:  Reviewed lipid panel & 10-year ASCVD risk score.  Encouraged a diet rich in vegetables & 150 minutes of exercise weekly.  Continue atorvastatin 10 mg.  Orders:  -     Comprehensive Metabolic Panel  -     Lipid Panel  -     CK    6. BEAR on CPAP  Comments:  Reports compliance and benefit from nightly CPAP use.    7. Vitamin D insufficiency  Comments:  Continue vitamin D supplementation.  Plan to recheck levels.  Orders:  -     Vitamin D 25 Hydroxy    8. Encounter for osteoporosis screening in asymptomatic postmenopausal patient  Comments:  Encourage 150 minutes of exercise weekly and over-the-counter calcium 1200 mg & vitamin D 800 international units supplementation.  Orders:  -     Vitamin D 25 Hydroxy  -     DEXA Bone Density Axial; Future    9. Encounter for Medicare annual wellness exam  -     CBC Auto Differential  -     Comprehensive Metabolic Panel  -     Lipid Panel  -     Vitamin B12  -     Vitamin D 25 Hydroxy  -     DEXA Bone Density Axial; Future      Follow Up:  Return in about 6 months (around 1/10/2021) for Recheck BP & anxiety.     An After Visit Summary and PPPS were given to the patient.     Signature    Ana Fermin MD  Family [facility]        This document has been electronically signed by Ana Fermin MD on July 10, 2020 14:21

## 2020-07-10 NOTE — PATIENT INSTRUCTIONS
Preventive Care 65 Years and Older, Female  Preventive care refers to lifestyle choices and visits with your health care provider that can promote health and wellness. This includes:  · A yearly physical exam. This is also called an annual well check.  · Regular dental and eye exams.  · Immunizations.  · Screening for certain conditions.  · Healthy lifestyle choices, such as diet and exercise.  What can I expect for my preventive care visit?  Physical exam  Your health care provider will check:  · Height and weight. These may be used to calculate body mass index (BMI), which is a measurement that tells if you are at a healthy weight.  · Heart rate and blood pressure.  · Your skin for abnormal spots.  Counseling  Your health care provider may ask you questions about:  · Alcohol, tobacco, and drug use.  · Emotional well-being.  · Home and relationship well-being.  · Sexual activity.  · Eating habits.  · History of falls.  · Memory and ability to understand (cognition).  · Work and work environment.  · Pregnancy and menstrual history.  What immunizations do I need?    Influenza (flu) vaccine  · This is recommended every year.  Tetanus, diphtheria, and pertussis (Tdap) vaccine  · You may need a Td booster every 10 years.  Varicella (chickenpox) vaccine  · You may need this vaccine if you have not already been vaccinated.  Zoster (shingles) vaccine  · You may need this after age 60.  Pneumococcal conjugate (PCV13) vaccine  · One dose is recommended after age 65.  Pneumococcal polysaccharide (PPSV23) vaccine  · One dose is recommended after age 65.  Measles, mumps, and rubella (MMR) vaccine  · You may need at least one dose of MMR if you were born in 1957 or later. You may also need a second dose.  Meningococcal conjugate (MenACWY) vaccine  · You may need this if you have certain conditions.  Hepatitis A vaccine  · You may need this if you have certain conditions or if you travel or work in places where you may be exposed  to hepatitis A.  Hepatitis B vaccine  · You may need this if you have certain conditions or if you travel or work in places where you may be exposed to hepatitis B.  Haemophilus influenzae type b (Hib) vaccine  · You may need this if you have certain conditions.  You may receive vaccines as individual doses or as more than one vaccine together in one shot (combination vaccines). Talk with your health care provider about the risks and benefits of combination vaccines.  What tests do I need?  Blood tests  · Lipid and cholesterol levels. These may be checked every 5 years, or more frequently depending on your overall health.  · Hepatitis C test.  · Hepatitis B test.  Screening  · Lung cancer screening. You may have this screening every year starting at age 55 if you have a 30-pack-year history of smoking and currently smoke or have quit within the past 15 years.  · Colorectal cancer screening. All adults should have this screening starting at age 50 and continuing until age 75. Your health care provider may recommend screening at age 45 if you are at increased risk. You will have tests every 1-10 years, depending on your results and the type of screening test.  · Diabetes screening. This is done by checking your blood sugar (glucose) after you have not eaten for a while (fasting). You may have this done every 1-3 years.  · Mammogram. This may be done every 1-2 years. Talk with your health care provider about how often you should have regular mammograms.  · BRCA-related cancer screening. This may be done if you have a family history of breast, ovarian, tubal, or peritoneal cancers.  Other tests  · Sexually transmitted disease (STD) testing.  · Bone density scan. This is done to screen for osteoporosis. You may have this done starting at age 65.  Follow these instructions at home:  Eating and drinking  · Eat a diet that includes fresh fruits and vegetables, whole grains, lean protein, and low-fat dairy products. Limit  your intake of foods with high amounts of sugar, saturated fats, and salt.  · Take vitamin and mineral supplements as recommended by your health care provider.  · Do not drink alcohol if your health care provider tells you not to drink.  · If you drink alcohol:  ? Limit how much you have to 0-1 drink a day.  ? Be aware of how much alcohol is in your drink. In the [place] one drink equals one 12 oz bottle of beer (355 mL), one 5 oz glass of wine (148 mL), or one 1½ oz glass of hard liquor (44 mL).  Lifestyle  · Take daily care of your teeth and gums.  · Stay active. Exercise for at least 30 minutes on 5 or more days each week.  · Do not use any products that contain nicotine or tobacco, such as cigarettes, e-cigarettes, and chewing tobacco. If you need help quitting, ask your health care provider.  · If you are sexually active, practice safe sex. Use a condom or other form of protection in order to prevent STIs (sexually transmitted infections).  · Talk with your health care provider about taking a low-dose aspirin or statin.  What's next?  · Go to your health care provider once a year for a well check visit.  · Ask your health care provider how often you should have your eyes and teeth checked.  · Stay up to date on all vaccines.  This information is not intended to replace advice given to you by your health care provider. Make sure you discuss any questions you have with your health care provider.  Document Released: 01/13/2017 Document Revised: 12/12/2019 Document Reviewed: 12/12/2019  Elsevier Patient Education © 2020 [employer] Inc[employer]      Mediterranean Diet  A Mediterranean diet refers to food and lifestyle choices that are based on the traditions of countries located on the [place]. This way of eating has been shown to help prevent certain conditions and improve outcomes for people who have chronic diseases, like kidney disease and heart disease.  What are tips for following this plan?  Lifestyle  · Cook  and eat meals together with your family, when possible.  · Drink enough fluid to keep your urine clear or pale yellow.  · Be physically active every day. This includes:  ? Aerobic exercise like running or swimming.  ? Leisure activities like gardening, walking, or housework.  · Get 7-8 hours of sleep each night.  · If recommended by your health care provider, drink red wine in moderation. This means 1 glass a day for nonpregnant women and 2 glasses a day for men. A glass of wine equals 5 oz (150 mL).  Reading food labels    · Check the serving size of packaged foods. For foods such as rice and pasta, the serving size refers to the amount of cooked product, not dry.  · Check the total fat in packaged foods. Avoid foods that have saturated fat or trans fats.  · Check the ingredients list for added sugars, such as corn syrup.  Shopping  · At the grocery store, buy most of your food from the areas near the walls of the store. This includes:  ? Fresh fruits and vegetables (produce).  ? Grains, beans, nuts, and seeds. Some of these may be available in unpackaged forms or large amounts (in bulk).  ? Fresh seafood.  ? Poultry and eggs.  ? Low-fat dairy products.  · Buy whole ingredients instead of prepackaged foods.  · Buy fresh fruits and vegetables in-season from local farmers markets.  · Buy frozen fruits and vegetables in resealable bags.  · If you do not have access to quality fresh seafood, buy precooked frozen shrimp or canned fish, such as tuna, salmon, or sardines.  · Buy small amounts of raw or cooked vegetables, salads, or olives from the deli or salad bar at your store.  · Stock your pantry so you always have certain foods on hand, such as olive oil, canned tuna, canned tomatoes, rice, pasta, and beans.  Cooking  · Cook foods with extra-virgin olive oil instead of using butter or other vegetable oils.  · Have meat as a side dish, and have vegetables or grains as your main dish. This means having meat in small  portions or adding small amounts of meat to foods like pasta or stew.  · Use beans or vegetables instead of meat in common dishes like chili or lasagna.  · [place] with different cooking methods. Try roasting or broiling vegetables instead of steaming or sautéeing them.  · Add frozen vegetables to soups, stews, pasta, or rice.  · Add nuts or seeds for added healthy fat at each meal. You can add these to yogurt, salads, or vegetable dishes.  · Marinate fish or vegetables using olive oil, lemon juice, garlic, and fresh herbs.  Meal planning    · Plan to eat 1 vegetarian meal one day each week. Try to work up to 2 vegetarian meals, if possible.  · Eat seafood 2 or more times a week.  · Have healthy snacks readily available, such as:  ? Vegetable sticks with hummus.  ? Greek yogurt.  ? Fruit and nut trail mix.  · Eat balanced meals throughout the week. This includes:  ? Fruit: 2-3 servings a day  ? Vegetables: 4-5 servings a day  ? Low-fat dairy: 2 servings a day  ? Fish, poultry, or lean meat: 1 serving a day  ? Beans and legumes: 2 or more servings a week  ? Nuts and seeds: 1-2 servings a day  ? Whole grains: 6-8 servings a day  ? Extra-virgin olive oil: 3-4 servings a day  · Limit red meat and sweets to only a few servings a month  What are my food choices?  · Mediterranean diet  ? Recommended  § Grains: Whole-grain pasta. Brown rice. Bulgar wheat. Polenta. Couscous. Whole-wheat bread. Oatmeal. Quinoa.  § Vegetables: Artichokes. Beets. Broccoli. Cabbage. Carrots. Eggplant. Green beans. Chard. Kale. Spinach. Onions. Leeks. Peas. Squash. Tomatoes. Peppers. Radishes.  § Fruits: Apples. Apricots. Avocado. Berries. Bananas. Cherries. Dates. Figs. Grapes. Mian. Melon. Oranges. Peaches. Plums. Pomegranate.  § Meats and other protein foods: Beans. Almonds. Sunflower seeds. Pine nuts. Peanuts. Cod. [place]. Scallops. Shrimp. Tuna. Tilapia. Clams. Oysters. Eggs.  § Dairy: Low-fat milk. Cheese. Greek yogurt.  § Beverages:  Water. Red wine. Herbal tea.  § Fats and oils: Extra virgin olive oil. Avocado oil. Grape seed oil.  § Sweets and desserts: Greek yogurt with honey. Baked apples. Poached pears. Trail mix.  § Seasoning and other foods: Basil. Cilantro. Coriander. Cumin. Mint. Parsley. [place]. Rosemary. Tarragon. Garlic. Oregano. Thyme. Pepper. Balsalmic vinegar. Tahini. Hummus. Tomato sauce. Olives. Mushrooms.  ? Limit these  § Grains: Prepackaged pasta or rice dishes. Prepackaged cereal with added sugar.  § Vegetables: Deep fried potatoes (french fries).  § Fruits: Fruit canned in syrup.  § Meats and other protein foods: Beef. Pork. Lamb. Poultry with skin. Hot dogs. Truner.  § Dairy: Ice cream. Sour cream. Whole milk.  § Beverages: Juice. Sugar-sweetened soft drinks. Beer. Liquor and spirits.  § Fats and oils: Butter. Canola oil. Vegetable oil. Beef fat (tallow). Lard.  § Sweets and desserts: Cookies. Cakes. Pies. Candy.  § Seasoning and other foods: Mayonnaise. Premade sauces and marinades.  The items listed may not be a complete list. Talk with your dietitian about what dietary choices are right for you.  Summary  · The Mediterranean diet includes both food and lifestyle choices.  · Eat a variety of fresh fruits and vegetables, beans, nuts, seeds, and whole grains.  · Limit the amount of red meat and sweets that you eat.  · Talk with your health care provider about whether it is safe for you to drink red wine in moderation. This means 1 glass a day for nonpregnant women and 2 glasses a day for men. A glass of wine equals 5 oz (150 mL).  This information is not intended to replace advice given to you by your health care provider. Make sure you discuss any questions you have with your health care provider.  Document Released: 08/10/2017 Document Revised: 08/17/2017 Document Reviewed: 08/10/2017  [employer] Patient Education © 2020 [employer]      Exercising to Stay Healthy  To become healthy and stay healthy, it is recommended that  you do moderate-intensity and vigorous-intensity exercise. You can tell that you are exercising at a moderate intensity if your heart starts beating faster and you start breathing faster but can still hold a conversation. You can tell that you are exercising at a vigorous intensity if you are breathing much harder and faster and cannot hold a conversation while exercising.  Exercising regularly is important. It has many health benefits, such as:  · Improving overall fitness, flexibility, and endurance.  · Increasing bone density.  · Helping with weight control.  · Decreasing body fat.  · Increasing muscle strength.  · Reducing stress and tension.  · Improving overall health.  How often should I exercise?  Choose an activity that you enjoy, and set realistic goals. Your health care provider can help you make an activity plan that works for you.  Exercise regularly as told by your health care provider. This may include:  · Doing strength training two times a week, such as:  ? Lifting weights.  ? Using resistance bands.  ? Push-ups.  ? Sit-ups.  ? Yoga.  · Doing a certain intensity of exercise for a given amount of time. Choose from these options:  ? A total of 150 minutes of moderate-intensity exercise every week.  ? A total of 75 minutes of vigorous-intensity exercise every week.  ? A mix of moderate-intensity and vigorous-intensity exercise every week.  Children, pregnant women, people who have not exercised regularly, people who are overweight, and older adults may need to talk with a health care provider about what activities are safe to do. If you have a medical condition, be sure to talk with your health care provider before you start a new exercise program.  What are some exercise ideas?  Moderate-intensity exercise ideas include:  · Walking 1 mile (1.6 km) in about 15 minutes.  · Biking.  · Hiking.  · Golfing.  · Dancing.  · Water aerobics.  Vigorous-intensity exercise ideas include:  · Walking 4.5 miles (7.2  km) or more in about 1 hour.  · Jogging or running 5 miles (8 km) in about 1 hour.  · Biking 10 miles (16.1 km) or more in about 1 hour.  · Lap swimming.  · Roller-skating or in-line skating.  · Cross-country skiing.  · Vigorous competitive sports, such as football, basketball, and soccer.  · Jumping rope.  · Aerobic dancing.  What are some everyday activities that can help me to get exercise?  · Yard work, such as:  ? Pushing a .  ? Raking and bagging leaves.  · Washing your car.  · Pushing a stroller.  · Shoveling snow.  · Gardening.  · Washing windows or floors.  How can I be more active in my day-to-day activities?  · Use stairs instead of an elevator.  · Take a walk during your lunch break.  · If you drive, park your car farther away from your work or school.  · If you take public transportation, get off one stop early and walk the rest of the way.  · Stand up or walk around during all of your indoor phone calls.  · Get up, stretch, and walk around every 30 minutes throughout the day.  · Enjoy exercise with a friend. Support to continue exercising will help you keep a regular routine of activity.  What guidelines can I follow while exercising?  · Before you start a new exercise program, talk with your health care provider.  · Do not exercise so much that you hurt yourself, feel dizzy, or get very short of breath.  · Wear comfortable clothes and wear shoes with good support.  · Drink plenty of water while you exercise to prevent dehydration or heat stroke.  · Work out until your breathing and your heartbeat get faster.  Where to find more information  · [place] Department of Health and Human Services: www.hhs.gov  · Centers for Disease Control and Prevention (CDC): www.cdc.gov  Summary  · Exercising regularly is important. It will improve your overall fitness, flexibility, and endurance.  · Regular exercise also will improve your overall health. It can help you control your weight, reduce stress, and  improve your bone density.  · Do not exercise so much that you hurt yourself, feel dizzy, or get very short of breath.  · Before you start a new exercise program, talk with your health care provider.  This information is not intended to replace advice given to you by your health care provider. Make sure you discuss any questions you have with your health care provider.  Document Released: 01/20/2012 Document Revised: 11/30/2018 Document Reviewed: 11/08/2018  Elsevier Patient Education © 2020 [employer].       ADVANCE CARE PLANNING  Conversations that Matter  PLANNING GUIDE    LOOKING BACK ...  Who we are, what we believe, and what we value are all shaped by experiences we have had. Buddhism, family traditions, jobs and friends affect us deeply.    Has anything happened in your past that shaped your feelings about medical treatment?    Think about an experience you may have had with a family member or friend who was faced with a decision about medical care near the end of life. What was positive about that experience? What do you wish would have been done differently?    HERE AND NOW ...  Do you have any significant health problems now? What kinds of things bring you such lilli that, should a health problem prevent you from doing them any more, life would have little meaning? What short- or long-term goals do you have? How might medical treatment help you or hinder you in accomplishing those goals?    WHAT ABOUT TOMORROW?  What significant health problems do you fear may affect you in the future? How do you feel about the possibility of having to go to a nursing home? How would decisions be made if you could not make them?    WHO SHOULD MAKE DECISIONS?  An important part of planning is to consider whether or not you could appoint someone to make your healthcare decisions if you could not make them yourself. Many people select a close family member, but you are free to pick anyone you think could best represent you.  "Whoever you appoint should have all of the following qualifications:    • Can be trusted.  • Is willing to accept this responsibility.  • Is willing to follow the values and instructions you have discussed.  • Is able to make complex, difficult decisions.    It is helpful, but not required, to appoint one or more alternate persons in case your first choice becomes unable or unwilling to represent you. It is best if only one person has authority at a time, but you can instruct your representatives to discuss decisions together if time permits.    WHAT FUTURE DECISIONS NEED TO BE CONSIDERED?  Providing instructions for future healthcare decisions may seem like an impossible task. How can anyone plan for all the possibilities? You cannot … but you do not have to.    You need to plan for situations where you:  1. Become unexpectedly incapable of making your own decisions  2. It is clear you will have little or no recovery, and  3. The injury or loss of function is significant.    Such a situation might arise because of an injury to the brain from an accident, a stroke, or a slowly progressive disease such as Alzheimer's.    To plan for this type of situation, many people state, \"If I'm going to be a vegetable, let me go,\" or \"No heroics,\" or \"Don't keep me alive on machines.\" While these remarks are a beginning, they simply are too vague to guide decision-making.    You need to completely describe under what circumstances your goals for medical care should be changed from attempting to prolong life to being allowed to die. In some situations, certain treatments may not make sense because they will not help, but other treatments will be of important benefit.    Consider these three questions:  1. When would it make sense to continue certain treatments in an effort to prolong life and seek recovery?  2. When would it make sense to stop or withhold certain treatments and accept death when it comes?  3. Under any " circumstance, what kind of comfort care would you want, including medication, spiritual and environmental options?    Making these choices requires understanding the information, weighing the benefits and burdens from your perspective, and then discussing your choices with those closest to you.    WHAT'S NEXT?  How do you make sure that your choices are respected? First talk, about them with your family, friends, clergy and physician, then put your choices in writing. Information about putting your plans into writing -- in an advance directive -- is available from your healthcare organization or .    Do you have any significant health problems? What health problems do you fear in the future?     Consider what frightens you most about medical treatment. What role does Christianity, mario alberto or spirituality play in how you live your life? How does cost influence your decisions about medical care?   In terms of future medical care, under what circumstances would you want the goals of medical treatment to switch from attempting to prolong life to focusing on comfort?     Describe these circumstances in as much detail as possible.   Ask yourself, what will most help me live well at this point in my life?     Share your views with the person or people who would make your medical decisions if you could not make them.     THERE'S NO EASY WAY TO PLAN FOR FUTURE HEALTHCARE CHOICES.  It's a process that involves thinking and talking about complex and sensitive issues.    The questions that follow will help in the advance care planning process. This is a guide for your own benefit; it's not a test, and there are no right or wrong answers. It does not need to be completed all at once. You may use it to share your feelings with healthcare providers, your family and your friends. The answers to these questions will help those you love make choices for you when you cannot make them yourself.    These are things I need to tell my  loved ones:  What is your idea of comfort care? Describe how you would want medications to be used to provide comfort. What type of spiritual care would you want?     I need to learn about: ____________________________  I need to ask my healthcare provider: ________________

## 2020-07-13 DIAGNOSIS — I10 ESSENTIAL HYPERTENSION: ICD-10-CM

## 2020-07-13 RX ORDER — AMLODIPINE BESYLATE 10 MG/1
10 TABLET ORAL
Qty: 90 TABLET | Refills: 1 | Status: SHIPPED | OUTPATIENT
Start: 2020-07-13 | End: 2021-01-11

## 2020-07-31 ENCOUNTER — TELEPHONE (OUTPATIENT)
Dept: FAMILY MEDICINE CLINIC | Facility: CLINIC | Age: 70
End: 2020-07-31

## 2020-07-31 NOTE — TELEPHONE ENCOUNTER
Pt has had a bladder sling and previously seen Dr. Ashley for urgency. She is asking who you recommend for Urology?

## 2020-07-31 NOTE — TELEPHONE ENCOUNTER
Please call patient and let her know that I do not have any recommendations for urology.  Does she need a referral to Dr. Ashley's office?

## 2020-07-31 NOTE — TELEPHONE ENCOUNTER
No need for referral.Has seen Dr. Ashley in the past and will go back to see him.She was just asking if you had any other recommendations. No referral needed.

## 2020-08-17 DIAGNOSIS — I10 ESSENTIAL HYPERTENSION: ICD-10-CM

## 2020-08-17 RX ORDER — LISINOPRIL AND HYDROCHLOROTHIAZIDE 20; 12.5 MG/1; MG/1
2 TABLET ORAL
Qty: 180 TABLET | Refills: 1 | Status: SHIPPED | OUTPATIENT
Start: 2020-08-17 | End: 2021-02-12

## 2020-08-24 RX ORDER — CITALOPRAM 20 MG/1
20 TABLET ORAL DAILY
Qty: 90 TABLET | Refills: 1 | Status: SHIPPED | OUTPATIENT
Start: 2020-08-24 | End: 2021-02-12

## 2020-10-07 ENCOUNTER — PATIENT MESSAGE (OUTPATIENT)
Dept: FAMILY MEDICINE CLINIC | Facility: CLINIC | Age: 70
End: 2020-10-07

## 2020-10-07 DIAGNOSIS — E78.2 MIXED HYPERLIPIDEMIA: Primary | ICD-10-CM

## 2020-10-07 DIAGNOSIS — E78.2 MIXED HYPERLIPIDEMIA: ICD-10-CM

## 2020-10-07 RX ORDER — ATORVASTATIN CALCIUM 10 MG/1
10 TABLET, FILM COATED ORAL NIGHTLY
Qty: 90 TABLET | Refills: 1 | Status: SHIPPED | OUTPATIENT
Start: 2020-10-07 | End: 2020-10-08 | Stop reason: SDUPTHER

## 2020-10-07 NOTE — TELEPHONE ENCOUNTER
Lab Results   Component Value Date    CHOL 219 (H) 07/10/2020    TRIG 150 07/10/2020    HDL 71 (H) 07/10/2020     (H) 07/10/2020     The 10-year ASCVD risk score (Claudia KHOURY Jr., et al., 2013) is: 12.8%    Values used to calculate the score:      Age: 70 years      Sex: Female      Is Non- : No      Diabetic: No      Tobacco smoker: No      Systolic Blood Pressure: 147 mmHg      Is BP treated: Yes      HDL Cholesterol: 71 mg/dL      Total Cholesterol: 219 mg/dL

## 2020-10-08 ENCOUNTER — TELEPHONE (OUTPATIENT)
Dept: FAMILY MEDICINE CLINIC | Facility: CLINIC | Age: 70
End: 2020-10-08

## 2020-10-08 RX ORDER — ATORVASTATIN CALCIUM 20 MG/1
20 TABLET, FILM COATED ORAL NIGHTLY
Qty: 90 TABLET | Refills: 1 | Status: SHIPPED | OUTPATIENT
Start: 2020-10-08 | End: 2021-04-13

## 2020-10-08 NOTE — TELEPHONE ENCOUNTER
From: Meliza Red  To: Ana Fermin MD  Sent: 10/7/2020 5:22 PM EDT  Subject: Prescription Question    My last visit with Dr Fermin, I think she told me that she wanted to increase my Atorvastatin to 20 mg, rather than 10 mg. Would you check on that for me and if that is correct, call in a prescription into [facility], [facility]   Thank-you

## 2020-10-08 NOTE — TELEPHONE ENCOUNTER
Patient called regarding her cholesterol medication. Wanted to let you know she needs 90 day supply. She forgot to send that in her [employer] message.

## 2020-11-24 ENCOUNTER — TRANSCRIBE ORDERS (OUTPATIENT)
Dept: ADMINISTRATIVE | Facility: HOSPITAL | Age: 70
End: 2020-11-24

## 2020-11-24 ENCOUNTER — HOSPITAL ENCOUNTER (OUTPATIENT)
Dept: CARDIOLOGY | Facility: HOSPITAL | Age: 70
Discharge: HOME | End: 2020-11-24

## 2020-11-24 ENCOUNTER — LAB (OUTPATIENT)
Dept: LAB | Facility: HOSPITAL | Age: 70
End: 2020-11-24

## 2020-11-24 DIAGNOSIS — R32 URINARY INCONTINENCE, UNSPECIFIED TYPE: ICD-10-CM

## 2020-11-24 DIAGNOSIS — R32 URINARY INCONTINENCE, UNSPECIFIED TYPE: Primary | ICD-10-CM

## 2020-11-24 LAB
ANION GAP SERPL CALCULATED.3IONS-SCNC: 12.2 MMOL/L (ref 5–15)
BASOPHILS # BLD AUTO: 0.02 10*3/MM3 (ref 0–0.2)
BASOPHILS NFR BLD AUTO: 0.3 % (ref 0–1.5)
BUN SERPL-MCNC: 14 MG/DL (ref 8–23)
BUN/CREAT SERPL: 19.2 (ref 7–25)
CALCIUM SPEC-SCNC: 9.7 MG/DL (ref 8.6–10.5)
CHLORIDE SERPL-SCNC: 100 MMOL/L (ref 98–107)
CO2 SERPL-SCNC: 29.8 MMOL/L (ref 22–29)
CREAT SERPL-MCNC: 0.73 MG/DL (ref 0.57–1)
DEPRECATED RDW RBC AUTO: 42.6 FL (ref 37–54)
EOSINOPHIL # BLD AUTO: 0.05 10*3/MM3 (ref 0–0.4)
EOSINOPHIL NFR BLD AUTO: 0.8 % (ref 0.3–6.2)
ERYTHROCYTE [DISTWIDTH] IN BLOOD BY AUTOMATED COUNT: 12 % (ref 12.3–15.4)
GFR SERPL CREATININE-BSD FRML MDRD: 79 ML/MIN/1.73
GLUCOSE SERPL-MCNC: 107 MG/DL (ref 65–99)
HCT VFR BLD AUTO: 40.1 % (ref 34–46.6)
HGB BLD-MCNC: 13.6 G/DL (ref 12–15.9)
IMM GRANULOCYTES # BLD AUTO: 0.03 10*3/MM3 (ref 0–0.05)
IMM GRANULOCYTES NFR BLD AUTO: 0.5 % (ref 0–0.5)
LYMPHOCYTES # BLD AUTO: 1.41 10*3/MM3 (ref 0.7–3.1)
LYMPHOCYTES NFR BLD AUTO: 23.6 % (ref 19.6–45.3)
MCH RBC QN AUTO: 32.8 PG (ref 26.6–33)
MCHC RBC AUTO-ENTMCNC: 33.9 G/DL (ref 31.5–35.7)
MCV RBC AUTO: 96.6 FL (ref 79–97)
MONOCYTES # BLD AUTO: 0.77 10*3/MM3 (ref 0.1–0.9)
MONOCYTES NFR BLD AUTO: 12.9 % (ref 5–12)
NEUTROPHILS NFR BLD AUTO: 3.7 10*3/MM3 (ref 1.7–7)
NEUTROPHILS NFR BLD AUTO: 61.9 % (ref 42.7–76)
NRBC BLD AUTO-RTO: 0 /100 WBC (ref 0–0.2)
PLATELET # BLD AUTO: 214 10*3/MM3 (ref 140–450)
PMV BLD AUTO: 9.4 FL (ref 6–12)
POTASSIUM SERPL-SCNC: 3.5 MMOL/L (ref 3.5–5.2)
QT INTERVAL: 374 MS
RBC # BLD AUTO: 4.15 10*6/MM3 (ref 3.77–5.28)
SODIUM SERPL-SCNC: 142 MMOL/L (ref 136–145)
WBC # BLD AUTO: 5.98 10*3/MM3 (ref 3.4–10.8)

## 2020-11-24 PROCEDURE — 93005 ELECTROCARDIOGRAM TRACING: CPT | Performed by: UROLOGY

## 2020-11-24 PROCEDURE — 93010 ELECTROCARDIOGRAM REPORT: CPT | Performed by: INTERNAL MEDICINE

## 2020-11-24 PROCEDURE — U0004 COV-19 TEST NON-CDC HGH THRU: HCPCS

## 2020-11-24 PROCEDURE — C9803 HOPD COVID-19 SPEC COLLECT: HCPCS

## 2020-11-24 PROCEDURE — 80048 BASIC METABOLIC PNL TOTAL CA: CPT

## 2020-11-24 PROCEDURE — 85025 COMPLETE CBC W/AUTO DIFF WBC: CPT

## 2020-11-24 PROCEDURE — 36415 COLL VENOUS BLD VENIPUNCTURE: CPT

## 2020-11-25 LAB — SARS-COV-2 RNA RESP QL NAA+PROBE: NOT DETECTED

## 2020-12-15 DIAGNOSIS — I10 ESSENTIAL HYPERTENSION: ICD-10-CM

## 2020-12-16 RX ORDER — CARVEDILOL 25 MG/1
50 TABLET ORAL 2 TIMES DAILY WITH MEALS
Qty: 360 TABLET | Refills: 1 | Status: SHIPPED | OUTPATIENT
Start: 2020-12-16 | End: 2021-06-16

## 2021-01-11 ENCOUNTER — OFFICE VISIT (OUTPATIENT)
Dept: FAMILY MEDICINE CLINIC | Facility: CLINIC | Age: 71
End: 2021-01-11

## 2021-01-11 VITALS
TEMPERATURE: 97.1 F | DIASTOLIC BLOOD PRESSURE: 84 MMHG | WEIGHT: 177 LBS | SYSTOLIC BLOOD PRESSURE: 149 MMHG | OXYGEN SATURATION: 96 % | HEART RATE: 70 BPM | HEIGHT: 65 IN | BODY MASS INDEX: 29.49 KG/M2

## 2021-01-11 DIAGNOSIS — I10 ESSENTIAL HYPERTENSION: ICD-10-CM

## 2021-01-11 DIAGNOSIS — Z13.820 ENCOUNTER FOR OSTEOPOROSIS SCREENING IN ASYMPTOMATIC POSTMENOPAUSAL PATIENT: ICD-10-CM

## 2021-01-11 DIAGNOSIS — Z12.31 ENCOUNTER FOR SCREENING MAMMOGRAM FOR MALIGNANT NEOPLASM OF BREAST: ICD-10-CM

## 2021-01-11 DIAGNOSIS — Z78.0 ENCOUNTER FOR OSTEOPOROSIS SCREENING IN ASYMPTOMATIC POSTMENOPAUSAL PATIENT: ICD-10-CM

## 2021-01-11 DIAGNOSIS — H69.81 EUSTACHIAN TUBE DYSFUNCTION, RIGHT: ICD-10-CM

## 2021-01-11 DIAGNOSIS — I10 ESSENTIAL HYPERTENSION: Primary | ICD-10-CM

## 2021-01-11 DIAGNOSIS — R91.8 MULTIPLE LUNG NODULES ON CT: ICD-10-CM

## 2021-01-11 PROCEDURE — 99214 OFFICE O/P EST MOD 30 MIN: CPT | Performed by: FAMILY MEDICINE

## 2021-01-11 RX ORDER — LANOLIN ALCOHOL/MO/W.PET/CERES
400 CREAM (GRAM) TOPICAL DAILY
Qty: 90 TABLET | Refills: 1 | Status: SHIPPED | OUTPATIENT
Start: 2021-01-11 | End: 2021-06-11

## 2021-01-11 RX ORDER — AMLODIPINE BESYLATE 10 MG/1
10 TABLET ORAL
Qty: 90 TABLET | Refills: 1 | Status: SHIPPED | OUTPATIENT
Start: 2021-01-11 | End: 2021-07-09

## 2021-01-11 RX ORDER — FLUTICASONE PROPIONATE 50 MCG
1 SPRAY, SUSPENSION (ML) NASAL DAILY
Qty: 16 G | Refills: 3 | Status: SHIPPED | OUTPATIENT
Start: 2021-01-11 | End: 2021-09-02 | Stop reason: ALTCHOICE

## 2021-01-11 NOTE — PROGRESS NOTES
Subjective:     Meliza Red is a 70 y.o. female who presents for  Chief Complaint   Patient presents with   • Hypertension     f/u    • Earache     right ear pain last 2 weeks        This is a known patient to me.     Postmenopausal  female has a concurrent medical history of essential hypertension that is moderately controlled with carvedilol 50 mg twice daily, lisinopril-HCTZ 40-25, and amlodipine 10 mg.  Patient is hypertensive in office.      Presents with complaints of right ear pain for the last two weeks. Reports some benefit from Mucinex and hydrogen peroxide.     Of note, patient is a former smoker with a concurrent medical history of lung nodules.    Past Medical Hx:  Past Medical History:   Diagnosis Date   • Allergic    • GERD (gastroesophageal reflux disease)    • Hepatic hemangioma 10/2017    Seen on MRI of the liver   • Hyperlipidemia    • Hypertension    • Pneumonia    • Ulcerative colitis (CMS/HCC) 03/2018    Followed by GI, Dr. Vanda Sotomayor; treated with sulfasalazine       Past Surgical Hx:  Past Surgical History:   Procedure Laterality Date   • ANKLE SURGERY Right     broken right ankle    • BACK SURGERY     • BLADDER SURGERY      bladder sling    • BREAST AUGMENTATION Bilateral 2015 & 11/28/2017    recurrent capsular contracture   • SKIN BIOPSY  02/06/2019    Shave biopsy of frontal scalp; irritated verruca vulgaris   • TOE SURGERY      big toe left foot    • TOTAL HIP ARTHROPLASTY Right    • TUBAL ABDOMINAL LIGATION Bilateral        Family Hx:  Family History   Problem Relation Age of Onset   • Hypertension Mother    • Hypertension Father    • Colon cancer Sister 43        Social History:  Social History     Socioeconomic History   • Marital status:      Spouse name: Not on file   • Number of children: Not on file   • Years of education: Not on file   • Highest education level: Not on file   Tobacco Use   • Smoking status: Former Smoker     Packs/day: 1.00     Years:  30.00     Pack years: 30.00     Types: Cigarettes     Quit date:      Years since quittin.0   • Smokeless tobacco: Never Used   Substance and Sexual Activity   • Alcohol use: Yes   • Drug use: No       Allergies:  Codeine    Current Meds:    Current Outpatient Medications:   •  amLODIPine (NORVASC) 10 MG tablet, Take 1 tablet by mouth Daily With Dinner., Disp: 90 tablet, Rfl: 1  •  atorvastatin (LIPITOR) 20 MG tablet, Take 1 tablet by mouth Every Night., Disp: 90 tablet, Rfl: 1  •  Calcium Carbonate (CALCIUM 600 PO), Take  by mouth., Disp: , Rfl:   •  carvedilol (COREG) 25 MG tablet, Take 2 tablets by mouth 2 (Two) Times a Day With Meals., Disp: 360 tablet, Rfl: 1  •  Cholecalciferol (VITAMIN D3) 125 MCG (5000 UT) tablet tablet, Take 1 tablet by mouth., Disp: , Rfl:   •  citalopram (CeleXA) 20 MG tablet, Take 1 tablet by mouth Daily., Disp: 90 tablet, Rfl: 1  •  diphenhydrAMINE (BENADRYL) 25 MG tablet, Take 50 mg by mouth., Disp: , Rfl:   •  folic acid (FOLVITE) 400 MCG tablet, Take 400 mcg by mouth Daily., Disp: , Rfl:   •  KRILL OIL PO, Take  by mouth., Disp: , Rfl:   •  lisinopril-hydrochlorothiazide (PRINZIDE,ZESTORETIC) 20-12.5 MG per tablet, Take 2 tablets by mouth Daily With Breakfast., Disp: 180 tablet, Rfl: 1  •  montelukast (SINGULAIR) 10 MG tablet, Take 10 mg by mouth Daily., Disp: , Rfl:   •  Multiple Vitamins-Minerals (MULTIVITAMIN WOMEN 50+) tablet, MULTIVITAMIN WOMEN 50+ TABS, Disp: , Rfl:   •  omeprazole (priLOSEC) 20 MG capsule, Take 1 capsule by mouth Daily., Disp: , Rfl:   •  sulfaSALAzine (AZULFIDINE) 500 MG tablet, Take 2 tablets by mouth 2 (Two) Times a Day., Disp: , Rfl:   •  triamcinolone (KENALOG) 0.1 % cream, , Disp: , Rfl:   •  Turmeric 500 MG tablet, Take  by mouth., Disp: , Rfl:   •  vitamin C (ASCORBIC ACID) 500 MG tablet, Take 500 mg by mouth Daily., Disp: , Rfl:   •  vitamin E 100 UNIT capsule, Take 400 Units by mouth Daily., Disp: , Rfl:     The following portions of the  "patient's history were reviewed and updated as appropriate: allergies, current medications, past family history, past medical history, past social history, past surgical history and problem list.    Review of Systems  Review of Systems   Constitutional: Negative for chills, diaphoresis and fatigue.   HENT: Positive for ear pain. Negative for congestion, sinus pressure and sneezing.    Eyes: Negative for double vision and redness.   Respiratory: Negative for cough and shortness of breath.    Cardiovascular: Negative for syncope.   Gastrointestinal: Positive for diarrhea (improved with Interstim). Negative for constipation and nausea.   Endocrine: Negative for polydipsia, polyphagia and polyuria.   Genitourinary: Positive for urinary incontinence (improved with Interstim). Negative for difficulty urinating, dysuria and hematuria.   Musculoskeletal: Negative for arthralgias, gait problem and myalgias.   Skin: Negative for skin lesions.   Neurological: Negative for tremors, seizures, syncope and headache.   Psychiatric/Behavioral: Negative for sleep disturbance and depressed mood. The patient is not nervous/anxious.        Objective:     /84 (BP Location: Left arm, Patient Position: Sitting, Cuff Size: Large Adult)   Pulse 70   Temp 97.1 °F (36.2 °C) (Infrared)   Ht 165.1 cm (65\")   Wt 80.3 kg (177 lb)   SpO2 96%   BMI 29.45 kg/m²     Physical Exam   Constitutional: She is oriented to person, place, and time. She appears well-developed and well-nourished. No distress. She appears overweight.   HENT:   Head: Normocephalic and atraumatic.   Right Ear: Ear canal normal. A middle ear effusion is present.   Left Ear: Tympanic membrane and ear canal normal.   Nose: Nose normal.   Eyes: Pupils are equal, round, and reactive to light. Conjunctivae are normal. No scleral icterus.   Neck: Normal range of motion.   Cardiovascular: Normal rate, regular rhythm and normal heart sounds.   Pulmonary/Chest: Effort normal and " breath sounds normal.   Abdominal: Soft. Bowel sounds are normal.   Genitourinary:   Genitourinary Comments: Interstim implant over right buttock.   Neurological: She is alert and oriented to person, place, and time.   Skin: Skin is warm and dry. Capillary refill takes less than 2 seconds. No rash noted. She is not diaphoretic.   Psychiatric: Her behavior is normal. Mood normal.   Vitals reviewed.    Lab Results   Component Value Date    WBC 5.98 11/24/2020    HGB 13.6 11/24/2020    HCT 40.1 11/24/2020    MCV 96.6 11/24/2020     11/24/2020     Lab Results   Component Value Date    GLUCOSE 107 (H) 11/24/2020    BUN 14 11/24/2020    CREATININE 0.73 11/24/2020    EGFRIFNONA 79 11/24/2020    EGFRIFAFRI >60 10/09/2017    BCR 19.2 11/24/2020    K 3.5 11/24/2020    CO2 29.8 (H) 11/24/2020    CALCIUM 9.7 11/24/2020    ALBUMIN 4.40 07/10/2020    LABIL2 1.4 09/11/2018    AST 54 (H) 07/10/2020    ALT 98 (H) 07/10/2020     Lab Results   Component Value Date    CHOL 219 (H) 07/10/2020    TRIG 150 07/10/2020    HDL 71 (H) 07/10/2020     (H) 07/10/2020     The 10-year ASCVD risk score (Claudia KHOURY Jr., et al., 2013) is: 13.2%    Values used to calculate the score:      Age: 70 years      Sex: Female      Is Non- : No      Diabetic: No      Tobacco smoker: No      Systolic Blood Pressure: 149 mmHg      Is BP treated: Yes      HDL Cholesterol: 71 mg/dL      Total Cholesterol: 219 mg/dL     Assessment/Plan:     Diagnoses and all orders for this visit:    1. Essential hypertension (Primary)  Comments:  BP goal <140/90  Encouraged low-Na+ diet & 150 min exercise/week.   Continue Coreg 50mg BID, lisinopril-HCTZ 40-25mg, & amlodipine 10mg.  Monitor ambulatory BP.    2. Eustachian tube dysfunction, right  Comments:  Advised against OTC intranasal alpha blocker.  Will try a course of intranasal steroid spray.  Orders:  -     fluticasone (Flonase) 50 MCG/ACT nasal spray; 1 spray into the nostril(s) as  directed by provider Daily. DO NOT SNIFF!!  Dispense: 16 g; Refill: 3    3. Multiple lung nodules on CT  Comments:  Former smoker.  Lung nodules measuring 3 mm (left lung), 4 mm (right lung), and 6 mm (right lung).  Repeat chest CT to monitor stability.  Orders:  -     CT Chest With Contrast; Future    4. Encounter for osteoporosis screening in asymptomatic postmenopausal patient  Comments:  Encouraged OTC calcium 1200 mg & vitamin D 800 IU daily.  Will obtain DEXA scan.  Repeat DEXA scan every 2 years.  Orders:  -     DEXA Bone Density Axial; Future    5. Encounter for screening mammogram for malignant neoplasm of breast  -     Mammo Screening Digital Tomosynthesis Bilateral With CAD; Future    Other orders  -     folic acid (FOLVITE) 400 MCG tablet; Take 1 tablet by mouth Daily.  Dispense: 90 tablet; Refill: 1      Follow-up:     Return in about 6 months (around 7/11/2021) for Medicare Wellness.      Signature    Ana Fermin MD  Family Medicine  [facility]        This document has been electronically signed by Ana Fermin MD on January 11, 2021 14:05 EST

## 2021-01-11 NOTE — PATIENT INSTRUCTIONS
Mediterranean Diet  A Mediterranean diet refers to food and lifestyle choices that are based on the traditions of countries located on the [place]. This way of eating has been shown to help prevent certain conditions and improve outcomes for people who have chronic diseases, like kidney disease and heart disease.  What are tips for following this plan?  Lifestyle  · Cook and eat meals together with your family, when possible.  · Drink enough fluid to keep your urine clear or pale yellow.  · Be physically active every day. This includes:  ? Aerobic exercise like running or swimming.  ? Leisure activities like gardening, walking, or housework.  · Get 7-8 hours of sleep each night.  · If recommended by your health care provider, drink red wine in moderation. This means 1 glass a day for nonpregnant women and 2 glasses a day for men. A glass of wine equals 5 oz (150 mL).  Reading food labels    · Check the serving size of packaged foods. For foods such as rice and pasta, the serving size refers to the amount of cooked product, not dry.  · Check the total fat in packaged foods. Avoid foods that have saturated fat or trans fats.  · Check the ingredients list for added sugars, such as corn syrup.  Shopping  · At the grocery store, buy most of your food from the areas near the walls of the store. This includes:  ? Fresh fruits and vegetables (produce).  ? Grains, beans, nuts, and seeds. Some of these may be available in unpackaged forms or large amounts (in bulk).  ? Fresh seafood.  ? Poultry and eggs.  ? Low-fat dairy products.  · Buy whole ingredients instead of prepackaged foods.  · Buy fresh fruits and vegetables in-season from local farmers markets.  · Buy frozen fruits and vegetables in resealable bags.  · If you do not have access to quality fresh seafood, buy precooked frozen shrimp or canned fish, such as tuna, salmon, or sardines.  · Buy small amounts of raw or cooked vegetables, salads, or olives from  the deli or salad bar at your store.  · Stock your pantry so you always have certain foods on hand, such as olive oil, canned tuna, canned tomatoes, rice, pasta, and beans.  Cooking  · Cook foods with extra-virgin olive oil instead of using butter or other vegetable oils.  · Have meat as a side dish, and have vegetables or grains as your main dish. This means having meat in small portions or adding small amounts of meat to foods like pasta or stew.  · Use beans or vegetables instead of meat in common dishes like chili or lasagna.  · [place] with different cooking methods. Try roasting or broiling vegetables instead of steaming or sautéeing them.  · Add frozen vegetables to soups, stews, pasta, or rice.  · Add nuts or seeds for added healthy fat at each meal. You can add these to yogurt, salads, or vegetable dishes.  · Marinate fish or vegetables using olive oil, lemon juice, garlic, and fresh herbs.  Meal planning    · Plan to eat 1 vegetarian meal one day each week. Try to work up to 2 vegetarian meals, if possible.  · Eat seafood 2 or more times a week.  · Have healthy snacks readily available, such as:  ? Vegetable sticks with hummus.  ? Greek yogurt.  ? Fruit and nut trail mix.  · Eat balanced meals throughout the week. This includes:  ? Fruit: 2-3 servings a day  ? Vegetables: 4-5 servings a day  ? Low-fat dairy: 2 servings a day  ? Fish, poultry, or lean meat: 1 serving a day  ? Beans and legumes: 2 or more servings a week  ? Nuts and seeds: 1-2 servings a day  ? Whole grains: 6-8 servings a day  ? Extra-virgin olive oil: 3-4 servings a day  · Limit red meat and sweets to only a few servings a month  What are my food choices?  · Mediterranean diet  ? Recommended  § Grains: Whole-grain pasta. Brown rice. Bulgar wheat. Polenta. Couscous. Whole-wheat bread. Oatmeal. Quinoa.  § Vegetables: Artichokes. Beets. Broccoli. Cabbage. Carrots. Eggplant. Green beans. Chard. Kale. Spinach. Onions. Leeks. Peas. Squash.  Tomatoes. Peppers. Radishes.  § Fruits: Apples. Apricots. Avocado. Berries. Bananas. Cherries. Dates. Figs. Grapes. Mian. Melon. Oranges. Peaches. Plums. Pomegranate.  § Meats and other protein foods: Beans. Almonds. Sunflower seeds. Pine nuts. Peanuts. Cod. Etters. Scallops. Shrimp. Tuna. Tilapia. Clams. Oysters. Eggs.  § Dairy: Low-fat milk. Cheese. Greek yogurt.  § Beverages: Water. Red wine. Herbal tea.  § Fats and oils: Extra virgin olive oil. Avocado oil. Grape seed oil.  § Sweets and desserts: Greek yogurt with honey. Baked apples. Poached pears. Trail mix.  § Seasoning and other foods: Basil. Cilantro. Coriander. Cumin. Mint. Parsley. Federico. Rosemary. Tarragon. Garlic. Oregano. Thyme. Pepper. Balsalmic vinegar. Tahini. Hummus. Tomato sauce. Olives. Mushrooms.  ? Limit these  § Grains: Prepackaged pasta or rice dishes. Prepackaged cereal with added sugar.  § Vegetables: Deep fried potatoes (french fries).  § Fruits: Fruit canned in syrup.  § Meats and other protein foods: Beef. Pork. Lamb. Poultry with skin. Hot dogs. Turner.  § Dairy: Ice cream. Sour cream. Whole milk.  § Beverages: Juice. Sugar-sweetened soft drinks. Beer. Liquor and spirits.  § Fats and oils: Butter. Canola oil. Vegetable oil. Beef fat (tallow). Lard.  § Sweets and desserts: Cookies. Cakes. Pies. Candy.  § Seasoning and other foods: Mayonnaise. Premade sauces and marinades.  The items listed may not be a complete list. Talk with your dietitian about what dietary choices are right for you.  Summary  · The Mediterranean diet includes both food and lifestyle choices.  · Eat a variety of fresh fruits and vegetables, beans, nuts, seeds, and whole grains.  · Limit the amount of red meat and sweets that you eat.  · Talk with your health care provider about whether it is safe for you to drink red wine in moderation. This means 1 glass a day for nonpregnant women and 2 glasses a day for men. A glass of wine equals 5 oz (150 mL).  This information  is not intended to replace advice given to you by your health care provider. Make sure you discuss any questions you have with your health care provider.  Document Revised: 08/17/2017 Document Reviewed: 08/10/2017  [employer] Patient Education © 2020 [employer]      Exercising to Stay Healthy  To become healthy and stay healthy, it is recommended that you do moderate-intensity and vigorous-intensity exercise. You can tell that you are exercising at a moderate intensity if your heart starts beating faster and you start breathing faster but can still hold a conversation. You can tell that you are exercising at a vigorous intensity if you are breathing much harder and faster and cannot hold a conversation while exercising.  Exercising regularly is important. It has many health benefits, such as:  · Improving overall fitness, flexibility, and endurance.  · Increasing bone density.  · Helping with weight control.  · Decreasing body fat.  · Increasing muscle strength.  · Reducing stress and tension.  · Improving overall health.  How often should I exercise?  Choose an activity that you enjoy, and set realistic goals. Your health care provider can help you make an activity plan that works for you.  Exercise regularly as told by your health care provider. This may include:  · Doing strength training two times a week, such as:  ? Lifting weights.  ? Using resistance bands.  ? Push-ups.  ? Sit-ups.  ? Yoga.  · Doing a certain intensity of exercise for a given amount of time. Choose from these options:  ? A total of 150 minutes of moderate-intensity exercise every week.  ? A total of 75 minutes of vigorous-intensity exercise every week.  ? A mix of moderate-intensity and vigorous-intensity exercise every week.  Children, pregnant women, people who have not exercised regularly, people who are overweight, and older adults may need to talk with a health care provider about what activities are safe to do. If you have a medical  condition, be sure to talk with your health care provider before you start a new exercise program.  What are some exercise ideas?  Moderate-intensity exercise ideas include:  · Walking 1 mile (1.6 km) in about 15 minutes.  · Biking.  · Hiking.  · Golfing.  · Dancing.  · Water aerobics.  Vigorous-intensity exercise ideas include:  · Walking 4.5 miles (7.2 km) or more in about 1 hour.  · Jogging or running 5 miles (8 km) in about 1 hour.  · Biking 10 miles (16.1 km) or more in about 1 hour.  · Lap swimming.  · Roller-skating or in-line skating.  · Cross-country skiing.  · Vigorous competitive sports, such as football, basketball, and soccer.  · Jumping rope.  · Aerobic dancing.  What are some everyday activities that can help me to get exercise?  · Yard work, such as:  ? Pushing a .  ? Raking and bagging leaves.  · Washing your car.  · Pushing a stroller.  · Shoveling snow.  · Gardening.  · Washing windows or floors.  How can I be more active in my day-to-day activities?  · Use stairs instead of an elevator.  · Take a walk during your lunch break.  · If you drive, park your car farther away from your work or school.  · If you take public transportation, get off one stop early and walk the rest of the way.  · Stand up or walk around during all of your indoor phone calls.  · Get up, stretch, and walk around every 30 minutes throughout the day.  · Enjoy exercise with a friend. Support to continue exercising will help you keep a regular routine of activity.  What guidelines can I follow while exercising?  · Before you start a new exercise program, talk with your health care provider.  · Do not exercise so much that you hurt yourself, feel dizzy, or get very short of breath.  · Wear comfortable clothes and wear shoes with good support.  · Drink plenty of water while you exercise to prevent dehydration or heat stroke.  · Work out until your breathing and your heartbeat get faster.  Where to find more  information  · [place] Department of Health and Human Services: www.hhs.gov  · Centers for Disease Control and Prevention (CDC): www.cdc.gov  Summary  · Exercising regularly is important. It will improve your overall fitness, flexibility, and endurance.  · Regular exercise also will improve your overall health. It can help you control your weight, reduce stress, and improve your bone density.  · Do not exercise so much that you hurt yourself, feel dizzy, or get very short of breath.  · Before you start a new exercise program, talk with your health care provider.  This information is not intended to replace advice given to you by your health care provider. Make sure you discuss any questions you have with your health care provider.  Document Revised: 11/30/2018 Document Reviewed: 11/08/2018  [employer] Patient Education © 2020 [employer]       ADVANCE CARE PLANNING  Conversations that Matter  PLANNING GUIDE    LOOKING BACK ...  Who we are, what we believe, and what we value are all shaped by experiences we have had. Christian, family traditions, jobs and friends affect us deeply.    Has anything happened in your past that shaped your feelings about medical treatment?    Think about an experience you may have had with a family member or friend who was faced with a decision about medical care near the end of life. What was positive about that experience? What do you wish would have been done differently?    HERE AND NOW ...  Do you have any significant health problems now? What kinds of things bring you such lilli that, should a health problem prevent you from doing them any more, life would have little meaning? What short- or long-term goals do you have? How might medical treatment help you or hinder you in accomplishing those goals?    WHAT ABOUT TOMORROW?  What significant health problems do you fear may affect you in the future? How do you feel about the possibility of having to go to a nursing home? How would decisions  "be made if you could not make them?    WHO SHOULD MAKE DECISIONS?  An important part of planning is to consider whether or not you could appoint someone to make your healthcare decisions if you could not make them yourself. Many people select a close family member, but you are free to pick anyone you think could best represent you. Whoever you appoint should have all of the following qualifications:    • Can be trusted.  • Is willing to accept this responsibility.  • Is willing to follow the values and instructions you have discussed.  • Is able to make complex, difficult decisions.    It is helpful, but not required, to appoint one or more alternate persons in case your first choice becomes unable or unwilling to represent you. It is best if only one person has authority at a time, but you can instruct your representatives to discuss decisions together if time permits.    WHAT FUTURE DECISIONS NEED TO BE CONSIDERED?  Providing instructions for future healthcare decisions may seem like an impossible task. How can anyone plan for all the possibilities? You cannot … but you do not have to.    You need to plan for situations where you:  1. Become unexpectedly incapable of making your own decisions  2. It is clear you will have little or no recovery, and  3. The injury or loss of function is significant.    Such a situation might arise because of an injury to the brain from an accident, a stroke, or a slowly progressive disease such as Alzheimer's.    To plan for this type of situation, many people state, \"If I'm going to be a vegetable, let me go,\" or \"No heroics,\" or \"Don't keep me alive on machines.\" While these remarks are a beginning, they simply are too vague to guide decision-making.    You need to completely describe under what circumstances your goals for medical care should be changed from attempting to prolong life to being allowed to die. In some situations, certain treatments may not make sense because they " will not help, but other treatments will be of important benefit.    Consider these three questions:  1. When would it make sense to continue certain treatments in an effort to prolong life and seek recovery?  2. When would it make sense to stop or withhold certain treatments and accept death when it comes?  3. Under any circumstance, what kind of comfort care would you want, including medication, spiritual and environmental options?    Making these choices requires understanding the information, weighing the benefits and burdens from your perspective, and then discussing your choices with those closest to you.    WHAT'S NEXT?  How do you make sure that your choices are respected? First talk, about them with your family, friends, clergy and physician, then put your choices in writing. Information about putting your plans into writing -- in an advance directive -- is available from your healthcare organization or .    Do you have any significant health problems? What health problems do you fear in the future?     Consider what frightens you most about medical treatment. What role does Protestant, mario alberto or spirituality play in how you live your life? How does cost influence your decisions about medical care?   In terms of future medical care, under what circumstances would you want the goals of medical treatment to switch from attempting to prolong life to focusing on comfort?     Describe these circumstances in as much detail as possible.   Ask yourself, what will most help me live well at this point in my life?     Share your views with the person or people who would make your medical decisions if you could not make them.     THERE'S NO EASY WAY TO PLAN FOR FUTURE HEALTHCARE CHOICES.  It's a process that involves thinking and talking about complex and sensitive issues.    The questions that follow will help in the advance care planning process. This is a guide for your own benefit; it's not a test, and there  are no right or wrong answers. It does not need to be completed all at once. You may use it to share your feelings with healthcare providers, your family and your friends. The answers to these questions will help those you love make choices for you when you cannot make them yourself.    These are things I need to tell my loved ones:  What is your idea of comfort care? Describe how you would want medications to be used to provide comfort. What type of spiritual care would you want?     I need to learn about: ____________________________  I need to ask my healthcare provider: ________________

## 2021-01-19 ENCOUNTER — TELEPHONE (OUTPATIENT)
Dept: FAMILY MEDICINE CLINIC | Facility: CLINIC | Age: 71
End: 2021-01-19

## 2021-01-19 NOTE — TELEPHONE ENCOUNTER
Please call patient and let her know that I ordered her mammogram and bone scan to be done at [facility].  Additionally I have ordered her chest CT to be done in June 2021 at the cancer center at [facility].    Please double check the orders from January 11, 2021.  I am seeing that the mammogram and bone scan were ordered as external orders and the chest CT ordered as hospital performed.

## 2021-01-19 NOTE — TELEPHONE ENCOUNTER
Pt left vm  Ct order is for [facility] but pt thought you wanted her to go to the same place she did one year ago which was cancer center so they could be compared. Where would you like pt to have this completed at?

## 2021-02-05 ENCOUNTER — OFFICE (AMBULATORY)
Dept: URBAN - METROPOLITAN AREA CLINIC 64 | Facility: CLINIC | Age: 71
End: 2021-02-05
Payer: COMMERCIAL

## 2021-02-05 VITALS
WEIGHT: 180 LBS | SYSTOLIC BLOOD PRESSURE: 135 MMHG | DIASTOLIC BLOOD PRESSURE: 76 MMHG | HEART RATE: 74 BPM | HEIGHT: 65 IN

## 2021-02-05 DIAGNOSIS — R15.9 FULL INCONTINENCE OF FECES: ICD-10-CM

## 2021-02-05 DIAGNOSIS — K59.1 FUNCTIONAL DIARRHEA: ICD-10-CM

## 2021-02-05 DIAGNOSIS — K21.9 GASTRO-ESOPHAGEAL REFLUX DISEASE WITHOUT ESOPHAGITIS: ICD-10-CM

## 2021-02-05 PROCEDURE — 99214 OFFICE O/P EST MOD 30 MIN: CPT | Performed by: INTERNAL MEDICINE

## 2021-02-05 RX ORDER — OMEPRAZOLE 20 MG/1
CAPSULE, DELAYED RELEASE ORAL
Qty: 90 | Refills: 3 | Status: COMPLETED
End: 2024-01-09

## 2021-02-05 RX ORDER — COLESTIPOL HYDROCHLORIDE 1 G/1
2 TABLET, FILM COATED ORAL
Qty: 180 | Refills: 3 | Status: ACTIVE
Start: 2021-02-05

## 2021-02-12 DIAGNOSIS — I10 ESSENTIAL HYPERTENSION: ICD-10-CM

## 2021-02-12 RX ORDER — LISINOPRIL AND HYDROCHLOROTHIAZIDE 20; 12.5 MG/1; MG/1
2 TABLET ORAL
Qty: 180 TABLET | Refills: 1 | Status: SHIPPED | OUTPATIENT
Start: 2021-02-12 | End: 2021-08-19

## 2021-02-12 RX ORDER — CITALOPRAM 20 MG/1
20 TABLET ORAL DAILY
Qty: 90 TABLET | Refills: 1 | Status: SHIPPED | OUTPATIENT
Start: 2021-02-12 | End: 2021-08-19

## 2021-02-22 ENCOUNTER — OFFICE (AMBULATORY)
Dept: URBAN - METROPOLITAN AREA PATHOLOGY 4 | Facility: PATHOLOGY | Age: 71
End: 2021-02-22
Payer: COMMERCIAL

## 2021-02-22 ENCOUNTER — ON CAMPUS - OUTPATIENT (AMBULATORY)
Dept: URBAN - METROPOLITAN AREA HOSPITAL 2 | Facility: HOSPITAL | Age: 71
End: 2021-02-22
Payer: COMMERCIAL

## 2021-02-22 VITALS
SYSTOLIC BLOOD PRESSURE: 122 MMHG | HEART RATE: 63 BPM | WEIGHT: 175 LBS | TEMPERATURE: 97.7 F | OXYGEN SATURATION: 93 % | DIASTOLIC BLOOD PRESSURE: 68 MMHG | SYSTOLIC BLOOD PRESSURE: 142 MMHG | OXYGEN SATURATION: 96 % | HEART RATE: 67 BPM | OXYGEN SATURATION: 95 % | HEART RATE: 74 BPM | RESPIRATION RATE: 16 BRPM | SYSTOLIC BLOOD PRESSURE: 144 MMHG | DIASTOLIC BLOOD PRESSURE: 74 MMHG | HEIGHT: 65 IN | SYSTOLIC BLOOD PRESSURE: 129 MMHG | SYSTOLIC BLOOD PRESSURE: 143 MMHG | HEART RATE: 68 BPM | RESPIRATION RATE: 18 BRPM | DIASTOLIC BLOOD PRESSURE: 91 MMHG | SYSTOLIC BLOOD PRESSURE: 123 MMHG | DIASTOLIC BLOOD PRESSURE: 72 MMHG | SYSTOLIC BLOOD PRESSURE: 118 MMHG | HEART RATE: 76 BPM | DIASTOLIC BLOOD PRESSURE: 79 MMHG | HEART RATE: 69 BPM | DIASTOLIC BLOOD PRESSURE: 69 MMHG | SYSTOLIC BLOOD PRESSURE: 159 MMHG

## 2021-02-22 DIAGNOSIS — K59.1 FUNCTIONAL DIARRHEA: ICD-10-CM

## 2021-02-22 DIAGNOSIS — K57.30 DIVERTICULOSIS OF LARGE INTESTINE WITHOUT PERFORATION OR ABS: ICD-10-CM

## 2021-02-22 LAB
GI HISTOLOGY: A. SELECT: (no result)
GI HISTOLOGY: PDF REPORT: (no result)

## 2021-02-22 PROCEDURE — 45380 COLONOSCOPY AND BIOPSY: CPT | Performed by: INTERNAL MEDICINE

## 2021-02-22 PROCEDURE — 88305 TISSUE EXAM BY PATHOLOGIST: CPT | Mod: 26 | Performed by: INTERNAL MEDICINE

## 2021-02-22 NOTE — SERVICEHPINOTES
Labs:BR2/21 - fecal farnaz 183, normal CBC, ESR/CRP. BRDiarrhea improved with colestipol.  Less urgency. SHAMIKA  LOKI ANDERSON  is a  71  female   who presents today for a  Colonoscopy   for   the indications listed below. The updated Patient Profile was reviewed prior to the procedure, in conjunction with the Physical Exam, including medical conditions, surgical procedures, medications, allergies, family history and social history. See Physical Exam time stamp below for date and time of HPI completion.Pre-operatively, I reviewed the indication(s) for the procedure, the risks of the procedure [including but not limited to: unexpected bleeding possibly requiring hospitalization and/or unplanned repeat procedures, perforation possibly requiring surgical treatment, missed lesions and complications of sedation/MAC (also explained by anesthesia staff)]. I have evaluated the patient for risks associated with the planned anesthesia and the procedure to be performed and find the patient an acceptable candidate for IV sedation.Multiple opportunities were provided for any questions or concerns, and all questions were answered satisfactorily before any anesthesia was administered. We will proceed with the planned procedure.SHAMIKA   COVID-19 coronavirus precautions were undertaken by myself and all staff present.SHAMIKA

## 2021-02-23 PROBLEM — K57.30 DIVERTICULOSIS OF COLON: Status: ACTIVE | Noted: 2021-02-22

## 2021-04-07 ENCOUNTER — OFFICE (AMBULATORY)
Dept: URBAN - METROPOLITAN AREA CLINIC 64 | Facility: CLINIC | Age: 71
End: 2021-04-07
Payer: COMMERCIAL

## 2021-04-07 VITALS
HEART RATE: 76 BPM | WEIGHT: 178 LBS | DIASTOLIC BLOOD PRESSURE: 71 MMHG | SYSTOLIC BLOOD PRESSURE: 129 MMHG | HEIGHT: 65 IN

## 2021-04-07 DIAGNOSIS — K59.1 FUNCTIONAL DIARRHEA: ICD-10-CM

## 2021-04-07 DIAGNOSIS — R15.2 FECAL URGENCY: ICD-10-CM

## 2021-04-07 DIAGNOSIS — K64.9 UNSPECIFIED HEMORRHOIDS: ICD-10-CM

## 2021-04-07 PROCEDURE — 99213 OFFICE O/P EST LOW 20 MIN: CPT | Performed by: INTERNAL MEDICINE

## 2021-04-07 RX ORDER — HYDROCORTISONE 25 MG/G
5 CREAM TOPICAL
Qty: 1 | Refills: 1 | Status: COMPLETED
Start: 2021-04-07 | End: 2022-11-01

## 2021-04-07 RX ORDER — OMEPRAZOLE 20 MG/1
CAPSULE, DELAYED RELEASE ORAL
Qty: 90 | Refills: 3 | Status: COMPLETED
End: 2024-01-09

## 2021-04-07 RX ORDER — SULFASALAZINE 500 MG/1
2000 TABLET ORAL
Qty: 360 | Refills: 3 | Status: ACTIVE
Start: 2018-10-31

## 2021-04-07 RX ORDER — FOLIC ACID 1 MG/1
1 TABLET ORAL
Qty: 90 | Refills: 3 | Status: ACTIVE
Start: 2020-02-28

## 2021-04-13 DIAGNOSIS — E78.2 MIXED HYPERLIPIDEMIA: ICD-10-CM

## 2021-04-13 RX ORDER — ATORVASTATIN CALCIUM 20 MG/1
20 TABLET, FILM COATED ORAL NIGHTLY
Qty: 90 TABLET | Refills: 1 | Status: SHIPPED | OUTPATIENT
Start: 2021-04-13 | End: 2021-09-29 | Stop reason: SDUPTHER

## 2021-04-13 NOTE — TELEPHONE ENCOUNTER
Lab Results   Component Value Date    CHOL 219 (H) 07/10/2020    TRIG 150 07/10/2020    HDL 71 (H) 07/10/2020     (H) 07/10/2020     The 10-year ASCVD risk score (Claudia KHOURY Jr., et al., 2013) is: 14.7%    Values used to calculate the score:      Age: 71 years      Sex: Female      Is Non- : No      Diabetic: No      Tobacco smoker: No      Systolic Blood Pressure: 149 mmHg      Is BP treated: Yes      HDL Cholesterol: 71 mg/dL      Total Cholesterol: 219 mg/dL

## 2021-06-02 ENCOUNTER — HOSPITAL ENCOUNTER (OUTPATIENT)
Dept: PET IMAGING | Facility: HOSPITAL | Age: 71
Discharge: HOME | End: 2021-06-02
Admitting: FAMILY MEDICINE

## 2021-06-02 DIAGNOSIS — R91.8 MULTIPLE LUNG NODULES ON CT: ICD-10-CM

## 2021-06-02 LAB — CREAT BLDA-MCNC: 0.6 MG/DL (ref 0.6–1.3)

## 2021-06-02 PROCEDURE — 71260 CT THORAX DX C+: CPT

## 2021-06-02 PROCEDURE — 82565 ASSAY OF CREATININE: CPT

## 2021-06-02 PROCEDURE — 0 IOPAMIDOL PER 1 ML: Performed by: FAMILY MEDICINE

## 2021-06-02 RX ADMIN — IOPAMIDOL 100 ML: 755 INJECTION, SOLUTION INTRAVENOUS at 13:28

## 2021-06-11 ENCOUNTER — HOSPITAL ENCOUNTER (OUTPATIENT)
Dept: CARDIOLOGY | Facility: HOSPITAL | Age: 71
Discharge: HOME | End: 2021-06-11

## 2021-06-11 ENCOUNTER — HOSPITAL ENCOUNTER (OUTPATIENT)
Dept: GENERAL RADIOLOGY | Facility: HOSPITAL | Age: 71
Discharge: HOME | End: 2021-06-11

## 2021-06-11 ENCOUNTER — OFFICE VISIT (OUTPATIENT)
Dept: FAMILY MEDICINE CLINIC | Facility: CLINIC | Age: 71
End: 2021-06-11

## 2021-06-11 VITALS
OXYGEN SATURATION: 95 % | SYSTOLIC BLOOD PRESSURE: 138 MMHG | BODY MASS INDEX: 27.99 KG/M2 | WEIGHT: 168 LBS | HEIGHT: 65 IN | HEART RATE: 97 BPM | DIASTOLIC BLOOD PRESSURE: 76 MMHG

## 2021-06-11 DIAGNOSIS — M79.89 PAIN AND SWELLING OF LEFT LOWER EXTREMITY: ICD-10-CM

## 2021-06-11 DIAGNOSIS — M25.562 CHRONIC PAIN OF LEFT KNEE: ICD-10-CM

## 2021-06-11 DIAGNOSIS — M79.605 PAIN AND SWELLING OF LEFT LOWER EXTREMITY: ICD-10-CM

## 2021-06-11 DIAGNOSIS — G89.29 CHRONIC PAIN OF LEFT KNEE: ICD-10-CM

## 2021-06-11 DIAGNOSIS — M25.562 CHRONIC PAIN OF LEFT KNEE: Primary | ICD-10-CM

## 2021-06-11 DIAGNOSIS — G89.29 CHRONIC PAIN OF LEFT KNEE: Primary | ICD-10-CM

## 2021-06-11 LAB
BH CV LOWER VASCULAR LEFT COMMON FEMORAL AUGMENT: NORMAL
BH CV LOWER VASCULAR LEFT COMMON FEMORAL COMPETENT: NORMAL
BH CV LOWER VASCULAR LEFT COMMON FEMORAL COMPRESS: NORMAL
BH CV LOWER VASCULAR LEFT COMMON FEMORAL PHASIC: NORMAL
BH CV LOWER VASCULAR LEFT COMMON FEMORAL SPONT: NORMAL
BH CV LOWER VASCULAR LEFT DISTAL FEMORAL COMPRESS: NORMAL
BH CV LOWER VASCULAR LEFT GASTRONEMIUS COMPRESS: NORMAL
BH CV LOWER VASCULAR LEFT GREATER SAPH AK COMPRESS: NORMAL
BH CV LOWER VASCULAR LEFT GREATER SAPH BK COMPRESS: NORMAL
BH CV LOWER VASCULAR LEFT LESSER SAPH COMPRESS: NORMAL
BH CV LOWER VASCULAR LEFT MID FEMORAL AUGMENT: NORMAL
BH CV LOWER VASCULAR LEFT MID FEMORAL COMPETENT: NORMAL
BH CV LOWER VASCULAR LEFT MID FEMORAL COMPRESS: NORMAL
BH CV LOWER VASCULAR LEFT MID FEMORAL PHASIC: NORMAL
BH CV LOWER VASCULAR LEFT MID FEMORAL SPONT: NORMAL
BH CV LOWER VASCULAR LEFT PERONEAL COMPRESS: NORMAL
BH CV LOWER VASCULAR LEFT POPLITEAL AUGMENT: NORMAL
BH CV LOWER VASCULAR LEFT POPLITEAL COMPETENT: NORMAL
BH CV LOWER VASCULAR LEFT POPLITEAL COMPRESS: NORMAL
BH CV LOWER VASCULAR LEFT POPLITEAL PHASIC: NORMAL
BH CV LOWER VASCULAR LEFT POPLITEAL SPONT: NORMAL
BH CV LOWER VASCULAR LEFT POSTERIOR TIBIAL COMPRESS: NORMAL
BH CV LOWER VASCULAR LEFT PROFUNDA FEMORAL COMPRESS: NORMAL
BH CV LOWER VASCULAR LEFT PROXIMAL FEMORAL COMPRESS: NORMAL
BH CV LOWER VASCULAR LEFT SAPHENOFEMORAL JUNCTION COMPRESS: NORMAL
BH CV LOWER VASCULAR RIGHT COMMON FEMORAL AUGMENT: NORMAL
BH CV LOWER VASCULAR RIGHT COMMON FEMORAL COMPETENT: NORMAL
BH CV LOWER VASCULAR RIGHT COMMON FEMORAL COMPRESS: NORMAL
BH CV LOWER VASCULAR RIGHT COMMON FEMORAL PHASIC: NORMAL
BH CV LOWER VASCULAR RIGHT COMMON FEMORAL SPONT: NORMAL
MAXIMAL PREDICTED HEART RATE: 151 BPM
STRESS TARGET HR: 128 BPM

## 2021-06-11 PROCEDURE — 73562 X-RAY EXAM OF KNEE 3: CPT

## 2021-06-11 PROCEDURE — 93971 EXTREMITY STUDY: CPT

## 2021-06-11 PROCEDURE — 99213 OFFICE O/P EST LOW 20 MIN: CPT | Performed by: FAMILY MEDICINE

## 2021-06-11 RX ORDER — APREMILAST 10-20-30MG
KIT ORAL
COMMUNITY
Start: 2021-04-01 | End: 2021-09-02

## 2021-06-11 RX ORDER — MONTELUKAST SODIUM 4 MG/1
TABLET, CHEWABLE ORAL
COMMUNITY
Start: 2020-10-04

## 2021-06-11 NOTE — PROGRESS NOTES
Patient notified. She would like to hold of on the referral for now and will see how she is feeling when she follows up with you in July
crib

## 2021-06-11 NOTE — PATIENT INSTRUCTIONS
Recommend no more than 3000 mg Tylenol (acetaminophen) in 24 hours. Extra strength Tylenol (acetaminophen) is 500 mg. You can take 2 tablets up to 3 times per day as needed for pain.     RICE Therapy for Routine Care of Injuries  Many injuries can be cared for with rest, ice, compression, and elevation (RICE therapy). This includes:  · Resting the injured part.  · Putting ice on the injury.  · Putting pressure (compression) on the injury.  · Raising the injured part (elevation).  Using RICE therapy can help to lessen pain and swelling.  Supplies needed:  · Ice.  · Plastic bag.  · Towel.  · Elastic bandage.  · Pillow or pillows to raise (elevate) your injured body part.  How to care for your injury with RICE therapy  Rest  Limit your normal activities, and try not to use the injured part of your body. You can go back to your normal activities when your doctor says it is okay to do them and you feel okay. Ask your doctor if you should do exercises to help your injury get better.  Ice  Put ice on the injured area. Do not put ice on your bare skin.  · Put ice in a plastic bag.  · Place a towel between your skin and the bag.  · Leave the ice on for 20 minutes, 2-3 times a day. Use ice on as many days as told by your doctor.    Compression  Compression means putting pressure on the injured area. This can be done with an elastic bandage. If an elastic bandage has been put on your injury:  · Do not wrap the bandage too tight. Wrap the bandage more loosely if part of your body away from the bandage is blue, swollen, cold, painful, or loses feeling (gets numb).  · Take off the bandage and put it on again. Do this every 3-4 hours or as told by your doctor.  · See your doctor if the bandage seems to make your problems worse.    Elevation  Elevation means keeping the injured area raised. If you can, raise the injured area above your heart or the center of your chest.  Contact a doctor if:  · You keep having pain and  swelling.  · Your symptoms get worse.  Get help right away if:  · You have sudden bad pain at your injury or lower than your injury.  · You have redness or more swelling around your injury.  · You have tingling or numbness at your injury or lower than your injury, and it does not go away when you take off the bandage.  Summary  · Many injuries can be cared for using rest, ice, compression, and elevation (RICE therapy).  · You can go back to your normal activities when you feel okay and your doctor says it is okay.  · Put ice on the injured area as told by your doctor.  · Get help if your symptoms get worse or if you keep having pain and swelling.  This information is not intended to replace advice given to you by your health care provider. Make sure you discuss any questions you have with your health care provider.  Document Revised: 09/07/2018 Document Reviewed: 09/07/2018  [employer] Patient Education © 2021 [employer]    Thrombophlebitis  Thrombophlebitis is a condition in which a blood clot forms in a vein. This can happen in your arms or legs, or in the area between your neck and groin (torso). When this condition happens in a vein that is close to the surface of the body (superficial thrombophlebitis), it is usually not serious.However, when the condition happens in a vein that is deep inside the body (deep vein thrombosis, DVT), it can cause serious problems.  What are the causes?  This condition may be caused by:  · Damage to a vein.  · Inflammation of the veins.  · A condition that causes blood to clot more easily.  · Reduced blood flow through the veins.  What increases the risk?  The following factors may make you more likely to develop this condition:  · Having a condition that makes blood thicker or more likely to clot.  · Having an infection.  · Having major surgery.  · Experiencing a traumatic injury or a broken bone.  · Having a catheter in a vein (central line).  · Having a condition in which  valves in the veins do not work properly, causing blood to collect (pool) in the veins (chronic venous insufficiency).  · An inactive (sedentary) lifestyle.  · Pregnancy or having recently given birth.  · Cancer.  · Older age, especially being 60 or older.  · Obesity.  · Smoking.  · Taking medicines that contain estrogen, such as birth control pills.  · Having varicose veins.  · Using drugs that are injected into the veins (intravenous, IV).  What are the signs or symptoms?  The main symptoms of this condition are:  · Swelling and pain in an arm or leg. If the affected vein is in the leg, you may feel pain while standing or walking.  · Warmth or redness in an arm or leg.  Other symptoms include:  · Low-grade fever.  · Muscle aches.  · A bulging vein (venous distension).  In some cases, there are no symptoms.  How is this diagnosed?  This condition may be diagnosed based on:  · Your symptoms and medical history.  · A physical exam.  · Tests, such as:  ? Blood tests.  ? A test that uses sound waves to make images (ultrasound).  How is this treated?  Treatment depends on how severe the condition is and which area of the body is affected. Treatment may include:  · Applying a warm compress or heating pad to affected areas.  · Wearing compression stockings to help prevent blood clots and reduce swelling in your legs.  · Raising (elevating) the affected arm or leg above the level of your heart.  · Medicines, such as:  ? Anti-inflammatory medicines, such as ibuprofen.  ? Blood thinners (anticoagulants), such as heparin.  ? Antibiotic medicine, if you have an infection.  · Removing an IV that may be causing the problem.  In rare cases, surgery may be needed to:  · Remove a damaged section of a vein.  · Place a filter in a large vein to catch blood clots before they reach the lungs.  Follow these instructions at home:  Medicines  · Take over-the-counter and prescription medicines only as told by your health care  provider.  · If you were prescribed an antibiotic, take it as told by your health care provider. Do not stop using the antibiotic even if you feel better.  Managing pain, stiffness, and swelling    · If directed, put heat on the affected area as often as told by your health care provider. Use the heat source that your health care provider recommends, such as a moist heat pack or a heating pad.  ? Place a towel between your skin and the heat source.  ? Leave the heat on for 20-30 minutes.  ? Remove the heat if your skin turns bright red. This is especially important if you are not able to feel pain, heat, or cold. You may have a greater risk of getting burned.  · Elevate the affected area above the level of your heart while you are sitting or lying down.  Activity  · Return to your normal activities as told by your health care provider. Ask your health care provider what activities are safe for you.  · Avoid sitting or lying down for long periods. If possible, stand up and walk around regularly.  If you are taking blood thinners:  · Take your medicine exactly as told, at the same time every day.  · Avoid activities that could cause injury or bruising, and follow instructions about how to prevent falls.  · Wear a medical alert bracelet or carry a card that lists what medicines you take.  General instructions  · Drink enough fluid to keep your urine pale yellow.  · Wear compression stockings as told by your health care provider.  · Do not use any products that contain nicotine or tobacco, such as cigarettes and e-cigarettes. If you need help quitting, ask your health care provider.  · Keep all follow-up visits as told by your health care provider. This is important.  Contact a health care provider if:  · You miss a dose of your blood thinner, if applicable.  · Your symptoms do not improve.  · You have unusual bruising.  · You have nausea, vomiting, or diarrhea that lasts for more than one day.  Get help right away  if:  · You have any of these problems:  ? New or worse pain, swelling, or redness in an arm or leg.  ? Numbness or tingling in an arm or leg.  ? Shortness of breath.  ? Chest pain.  ? Severe pain in your abdomen.  ? Fast breathing.  ? A fast or irregular heartbeat.  ? Blood in your vomit, stool, or urine.  ? A severe headache or confusion.  ? A cut that does not stop bleeding.  · You feel light-headed or dizzy.  · You cough up blood.  · You have a serious fall or accident, or you hit your head.  These symptoms may represent a serious problem that is an emergency. Do not wait to see if the symptoms will go away. Get medical help right away. Call your local emergency services (911 in the [place] Do not drive yourself to the hospital.  Summary  · Thrombophlebitis is a condition in which a blood clot forms in a vein. This can happen in a vein close to the surface of the body or a vein deep inside the body.  · This condition can cause serious problems when it happens in a vein deep inside the body (deep vein thrombosis, DVT).  · The main symptom of this condition is swelling and pain around the affected vein.  · Treatment may include warm compresses, anti-inflammatory medicines, or blood thinners.  This information is not intended to replace advice given to you by your health care provider. Make sure you discuss any questions you have with your health care provider.  Document Revised: 04/08/2020 Document Reviewed: 06/13/2018  [employer] Patient Education © 2021 [employer]

## 2021-06-11 NOTE — PROGRESS NOTES
Subjective:     Meliza Red is a 71 y.o. female who presents for  Chief Complaint   Patient presents with   • Knee Pain     left knee for approx 1 month     Postmenopausal  female with her medical history of hypertension and tobacco use presents with complaints of anterior left knee pain and posterior knee pain extending to calf for the last month. Posterior pain believed to be related to varicose veins. Denies any injury.  Accidentally using cane in the right hand. Alternating between heat and ice. Not as mobile due to pain.    Past Medical Hx:  Past Medical History:   Diagnosis Date   • Allergic    • GERD (gastroesophageal reflux disease)    • Hepatic hemangioma 10/2017    Seen on MRI of the liver   • Hyperlipidemia    • Hypertension    • Pneumonia    • Ulcerative colitis (CMS/HCC) 03/2018    Followed by GI, Dr. Vanda Sotomayor; treated with sulfasalazine       Past Surgical Hx:  Past Surgical History:   Procedure Laterality Date   • ANKLE SURGERY Right     broken right ankle    • BACK SURGERY     • BLADDER SURGERY      bladder sling    • BREAST AUGMENTATION Bilateral 2015 & 11/28/2017    recurrent capsular contracture   • INTERSTIM PLACEMENT Right 11/2020    for bladder and bowel control - [employer]   • SKIN BIOPSY  02/06/2019    Shave biopsy of frontal scalp; irritated verruca vulgaris   • TOE SURGERY      big toe left foot    • TOTAL HIP ARTHROPLASTY Right    • TUBAL ABDOMINAL LIGATION Bilateral        Social History:  she  reports that she quit smoking about 8 years ago. Her smoking use included cigarettes. She has a 30.00 pack-year smoking history. She has never used smokeless tobacco. She reports current alcohol use. She reports that she does not use drugs.    Current Meds:    Current Outpatient Medications:   •  Apremilast (Otezla) 10 & 20 & 30 MG tablet therapy pack, , Disp: , Rfl:   •  colestipol (COLESTID) 1 g tablet, , Disp: , Rfl:   •  amLODIPine (NORVASC) 10 MG tablet, Take 1 tablet by  "mouth Daily With Dinner., Disp: 90 tablet, Rfl: 1  •  atorvastatin (LIPITOR) 20 MG tablet, Take 1 tablet by mouth Every Night., Disp: 90 tablet, Rfl: 1  •  Calcium Carbonate (CALCIUM 600 PO), Take  by mouth., Disp: , Rfl:   •  carvedilol (COREG) 25 MG tablet, Take 2 tablets by mouth 2 (Two) Times a Day With Meals., Disp: 360 tablet, Rfl: 1  •  Cholecalciferol (VITAMIN D3) 125 MCG (5000 UT) tablet tablet, Take 1 tablet by mouth., Disp: , Rfl:   •  citalopram (CeleXA) 20 MG tablet, Take 1 tablet by mouth Daily., Disp: 90 tablet, Rfl: 1  •  fluticasone (Flonase) 50 MCG/ACT nasal spray, 1 spray into the nostril(s) as directed by provider Daily. DO NOT SNIFF!!, Disp: 16 g, Rfl: 3  •  KRILL OIL PO, Take  by mouth., Disp: , Rfl:   •  lisinopril-hydrochlorothiazide (PRINZIDE,ZESTORETIC) 20-12.5 MG per tablet, Take 2 tablets by mouth Daily With Breakfast., Disp: 180 tablet, Rfl: 1  •  montelukast (SINGULAIR) 10 MG tablet, Take 10 mg by mouth Daily., Disp: , Rfl:   •  omeprazole (priLOSEC) 20 MG capsule, Take 1 capsule by mouth Daily., Disp: , Rfl:   •  sulfaSALAzine (AZULFIDINE) 500 MG tablet, Take 2 tablets by mouth 2 (Two) Times a Day., Disp: , Rfl:   •  vitamin E 100 UNIT capsule, Take 400 Units by mouth Daily., Disp: , Rfl:       Review of Systems  Review of Systems   Musculoskeletal: Positive for arthralgias (carpal tunnel right wrist, left knee).       Objective:     /76 (BP Location: Left arm, Patient Position: Sitting, Cuff Size: Adult)   Pulse 97   Ht 165.1 cm (65\")   Wt 76.2 kg (168 lb)   SpO2 95%   BMI 27.96 kg/m²     Physical Exam  Vitals reviewed.   Constitutional:       General: She is not in acute distress.     Appearance: She is well-developed. She is not diaphoretic.   HENT:      Head: Normocephalic and atraumatic.   Cardiovascular:      Rate and Rhythm: Normal rate and regular rhythm.   Pulmonary:      Effort: Pulmonary effort is normal.      Breath sounds: Normal breath sounds. "   Musculoskeletal:      Right knee: No crepitus. Normal range of motion.      Left knee: Swelling present. No crepitus. Normal range of motion. Tenderness present over the MCL. Normal meniscus.      Right lower leg: No edema.      Left lower leg: Swelling present. No edema.   Skin:     General: Skin is warm and dry.   Neurological:      Mental Status: She is alert and oriented to person, place, and time.   Psychiatric:         Mood and Affect: Mood normal.         Behavior: Behavior normal.         Lab Results   Component Value Date    WBC 5.98 11/24/2020    HGB 13.6 11/24/2020    HCT 40.1 11/24/2020    MCV 96.6 11/24/2020     11/24/2020     Lab Results   Component Value Date    GLUCOSE 107 (H) 11/24/2020    BUN 14 11/24/2020    CREATININE 0.60 06/02/2021    EGFRIFNONA 79 11/24/2020    EGFRIFAFRI >60 10/09/2017    BCR 19.2 11/24/2020    K 3.5 11/24/2020    CO2 29.8 (H) 11/24/2020    CALCIUM 9.7 11/24/2020    ALBUMIN 4.40 07/10/2020    LABIL2 1.4 09/11/2018    AST 54 (H) 07/10/2020    ALT 98 (H) 07/10/2020        Assessment/Plan:     Problem List Items Addressed This Visit     None      Visit Diagnoses     Chronic pain of left knee    -  Primary    Relevant Medications    Diclofenac Sodium (VOLTAREN) 1 % gel gel    Other Relevant Orders    XR Knee 3 View Left    Duplex Venous Lower Extremity - Left CAR    Pain and swelling of left lower extremity        Relevant Orders    XR Knee 3 View Left    Duplex Venous Lower Extremity - Left CAR         Will obtain imaging studies.   Discussed possible OA, thrombophlebitis, and DVT.  Encouraged RICE therapy.  Advised < 3 acetaminophen/day PRN.     Follow-up:     Return if symptoms worsen or fail to improve.

## 2021-06-15 DIAGNOSIS — I10 ESSENTIAL HYPERTENSION: ICD-10-CM

## 2021-06-16 RX ORDER — CARVEDILOL 25 MG/1
50 TABLET ORAL 2 TIMES DAILY WITH MEALS
Qty: 360 TABLET | Refills: 1 | Status: SHIPPED | OUTPATIENT
Start: 2021-06-16 | End: 2021-12-21

## 2021-07-09 DIAGNOSIS — I10 ESSENTIAL HYPERTENSION: ICD-10-CM

## 2021-07-09 RX ORDER — AMLODIPINE BESYLATE 10 MG/1
10 TABLET ORAL
Qty: 90 TABLET | Refills: 1 | Status: SHIPPED | OUTPATIENT
Start: 2021-07-09 | End: 2022-01-04

## 2021-07-12 ENCOUNTER — TELEPHONE (OUTPATIENT)
Dept: FAMILY MEDICINE CLINIC | Facility: CLINIC | Age: 71
End: 2021-07-12

## 2021-07-12 NOTE — TELEPHONE ENCOUNTER
Please call patient and ask if she wants an MRI of the knee or of the back.    We already know she has significant arthritis in the left knee.  I do not think an MRI will change that.  However I can send her to orthopedics if she is having significant pain for further evaluation and recommendation.    If she wants an MRI of the back, she will likely need to be seen in office for her back pain.

## 2021-07-12 NOTE — TELEPHONE ENCOUNTER
Pt wanted MRI of back, informed her she would need to be seen in office for back pain. Pt making appt

## 2021-07-12 NOTE — TELEPHONE ENCOUNTER
Caller: Meliza Red    Relationship: Self    Best call back number: [phone]     What is the medical concern/diagnosis:   PATIENT STATES THAT SHE WAS SEEN ON 6/11/21 FOR PAIN IN HER BACK AND LEG. SHE STATES THAT SHE IS STILL EXPERIENCING PAIN OFF AND ON      What specialty or service is being requested: MRI    What is the provider, practice or medical service name:   What is the office location:  What is the office phone number:    Any additional details:

## 2021-07-19 ENCOUNTER — OFFICE VISIT (OUTPATIENT)
Dept: FAMILY MEDICINE CLINIC | Facility: CLINIC | Age: 71
End: 2021-07-19

## 2021-07-19 VITALS
DIASTOLIC BLOOD PRESSURE: 72 MMHG | HEIGHT: 65 IN | SYSTOLIC BLOOD PRESSURE: 143 MMHG | TEMPERATURE: 98.2 F | OXYGEN SATURATION: 92 % | HEART RATE: 86 BPM | BODY MASS INDEX: 27.49 KG/M2 | WEIGHT: 165 LBS

## 2021-07-19 DIAGNOSIS — G89.29 CHRONIC LEFT-SIDED LOW BACK PAIN WITHOUT SCIATICA: Primary | ICD-10-CM

## 2021-07-19 DIAGNOSIS — M54.50 CHRONIC LEFT-SIDED LOW BACK PAIN WITHOUT SCIATICA: Primary | ICD-10-CM

## 2021-07-19 PROCEDURE — 99213 OFFICE O/P EST LOW 20 MIN: CPT | Performed by: FAMILY MEDICINE

## 2021-07-19 NOTE — PROGRESS NOTES
Subjective:     Meliza Red is a 71 y.o. female who presents for  Chief Complaint   Patient presents with   • Leg Pain     left leg pain.started a couple months ago. trouble sitting,walking,standing     Postmenopausal  female with a concurrent medical history of hypertension, tobacco use, lumbar disc herniation s/p 2 back surgeries, & severe OA of left knee presents with complaints of left leg pain radiating from buttock to ankle. Exacerbated by sitting and standing for extended period of time. Pain improves in the reclined position. Persisted for the last month. Refractory to diclofenac gel.    Hypertensive in office.    Past Medical Hx:  Past Medical History:   Diagnosis Date   • Allergic    • GERD (gastroesophageal reflux disease)    • Hepatic hemangioma 10/2017    Seen on MRI of the liver   • Hyperlipidemia    • Hypertension    • Pneumonia    • Ulcerative colitis (CMS/HCC) 03/2018    Followed by GI, Dr. Vanda Sotomayor; treated with sulfasalazine       Past Surgical Hx:  Past Surgical History:   Procedure Laterality Date   • ANKLE SURGERY Right     broken right ankle    • BACK SURGERY     • BLADDER SURGERY      bladder sling    • BREAST AUGMENTATION Bilateral 2015 & 11/28/2017    recurrent capsular contracture   • INTERSTIM PLACEMENT Right 11/2020    for bladder and bowel control - [employer]   • SKIN BIOPSY  02/06/2019    Shave biopsy of frontal scalp; irritated verruca vulgaris   • TOE SURGERY      big toe left foot    • TOTAL HIP ARTHROPLASTY Right    • TUBAL ABDOMINAL LIGATION Bilateral        Social History:  she  reports that she quit smoking about 8 years ago. Her smoking use included cigarettes. She has a 30.00 pack-year smoking history. She has never used smokeless tobacco. She reports current alcohol use. She reports that she does not use drugs.    Current Meds:    Current Outpatient Medications:   •  amLODIPine (NORVASC) 10 MG tablet, Take 1 tablet by mouth Daily With Dinner., Disp: 90  "tablet, Rfl: 1  •  Apremilast (Otezla) 10 & 20 & 30 MG tablet therapy pack, , Disp: , Rfl:   •  atorvastatin (LIPITOR) 20 MG tablet, Take 1 tablet by mouth Every Night., Disp: 90 tablet, Rfl: 1  •  Calcium Carbonate (CALCIUM 600 PO), Take  by mouth., Disp: , Rfl:   •  carvedilol (COREG) 25 MG tablet, Take 2 tablets by mouth 2 (Two) Times a Day With Meals., Disp: 360 tablet, Rfl: 1  •  Cholecalciferol (VITAMIN D3) 125 MCG (5000 UT) tablet tablet, Take 1 tablet by mouth., Disp: , Rfl:   •  citalopram (CeleXA) 20 MG tablet, Take 1 tablet by mouth Daily., Disp: 90 tablet, Rfl: 1  •  colestipol (COLESTID) 1 g tablet, , Disp: , Rfl:   •  Diclofenac Sodium (VOLTAREN) 1 % gel gel, Apply 4 g topically to the appropriate area as directed 4 (Four) Times a Day As Needed (Joint Pain)., Disp: 100 g, Rfl: 1  •  fluticasone (Flonase) 50 MCG/ACT nasal spray, 1 spray into the nostril(s) as directed by provider Daily. DO NOT SNIFF!!, Disp: 16 g, Rfl: 3  •  KRILL OIL PO, Take  by mouth., Disp: , Rfl:   •  lisinopril-hydrochlorothiazide (PRINZIDE,ZESTORETIC) 20-12.5 MG per tablet, Take 2 tablets by mouth Daily With Breakfast., Disp: 180 tablet, Rfl: 1  •  montelukast (SINGULAIR) 10 MG tablet, Take 10 mg by mouth Daily., Disp: , Rfl:   •  omeprazole (priLOSEC) 20 MG capsule, Take 1 capsule by mouth Daily., Disp: , Rfl:   •  sulfaSALAzine (AZULFIDINE) 500 MG tablet, Take 2 tablets by mouth 2 (Two) Times a Day., Disp: , Rfl:   •  vitamin E 100 UNIT capsule, Take 400 Units by mouth Daily., Disp: , Rfl:       Review of Systems  Review of Systems   Musculoskeletal: Positive for arthralgias (left leg).       Objective:     /72 (BP Location: Left arm, Patient Position: Sitting, Cuff Size: Small Adult)   Pulse 86   Temp 98.2 °F (36.8 °C) (Infrared)   Ht 165.1 cm (65\")   Wt 74.8 kg (165 lb)   SpO2 92%   BMI 27.46 kg/m²     Physical Exam  Vitals reviewed.   Constitutional:       General: She is not in acute distress.     Appearance: " She is well-developed. She is not diaphoretic.   HENT:      Head: Normocephalic and atraumatic.   Musculoskeletal:      Lumbar back: Negative right straight leg raise test and negative left straight leg raise test.      Right hip: Normal range of motion. Normal strength.      Left hip: Normal range of motion. Normal strength.   Skin:     General: Skin is warm and dry.   Neurological:      Mental Status: She is alert and oriented to person, place, and time.   Psychiatric:         Mood and Affect: Mood normal.         Behavior: Behavior normal.         Lab Results   Component Value Date    WBC 5.98 11/24/2020    HGB 13.6 11/24/2020    HCT 40.1 11/24/2020    MCV 96.6 11/24/2020     11/24/2020     Lab Results   Component Value Date    GLUCOSE 107 (H) 11/24/2020    BUN 14 11/24/2020    CREATININE 0.60 06/02/2021    EGFRIFNONA 79 11/24/2020    EGFRIFAFRI >60 10/09/2017    BCR 19.2 11/24/2020    K 3.5 11/24/2020    CO2 29.8 (H) 11/24/2020    CALCIUM 9.7 11/24/2020    ALBUMIN 4.40 07/10/2020    LABIL2 1.4 09/11/2018    AST 54 (H) 07/10/2020    ALT 98 (H) 07/10/2020        Assessment/Plan:     Problem List Items Addressed This Visit        Musculoskeletal and Injuries    Chronic back pain - Primary    Relevant Medications    diclofenac (VOLTAREN) 50 MG EC tablet    Other Relevant Orders    CT Lumbar Spine With & Without Contrast    Ambulatory Referral to Pain Management         Will obtain imaging studies.   Declined surgical intervention.  Interested in referral to pain management.   Encouraged RICE therapy.  Will try a course of NSAIDs as tolerated by BP and renal function.  Advised < 3 acetaminophen/day PRN.     Follow-up:     Return if symptoms worsen or fail to improve.

## 2021-07-26 ENCOUNTER — HOSPITAL ENCOUNTER (OUTPATIENT)
Dept: CT IMAGING | Facility: HOSPITAL | Age: 71
Discharge: HOME | End: 2021-07-26
Admitting: FAMILY MEDICINE

## 2021-07-26 DIAGNOSIS — G89.29 CHRONIC LEFT-SIDED LOW BACK PAIN WITHOUT SCIATICA: ICD-10-CM

## 2021-07-26 DIAGNOSIS — M54.50 CHRONIC LEFT-SIDED LOW BACK PAIN WITHOUT SCIATICA: ICD-10-CM

## 2021-07-26 LAB — CREAT BLDA-MCNC: 0.6 MG/DL (ref 0.6–1.3)

## 2021-07-26 PROCEDURE — 0 IOPAMIDOL PER 1 ML: Performed by: FAMILY MEDICINE

## 2021-07-26 PROCEDURE — 72133 CT LUMBAR SPINE W/O & W/DYE: CPT

## 2021-07-26 PROCEDURE — 82565 ASSAY OF CREATININE: CPT

## 2021-07-26 RX ADMIN — IOPAMIDOL 100 ML: 755 INJECTION, SOLUTION INTRAVENOUS at 16:32

## 2021-07-26 NOTE — PROGRESS NOTES
CHIEF COMPLAINT  Low back pain      Subjective   Meliza Red is a 71 y.o. female who was referred by Dr. Fermin, Ana Harrington MD  to our pain management clinic for consultation, evaluation and treatment of low back pain.  Her pain started about 2 months ago and has been getting worse since then.  Denies any significant injuries.    Low back pain is 5/10 on VAS, at maximum is 9/10. Pain is throbbing, pressure in nature. Pain is referred left leg and dorsum of the L foot. Denies any numbness, tingling, weakness. No pain on right leg. Leg pain bothers her more than any back pain. The pain is constant. The pain is improved by diclofenac and being recliner. The pain is worse with sitting and standing for extended of time.    PHQ-9- 4  SOAPP- 5    PMH:   Back surgeries x2 (2011) - lumbar disectomy/ decompression L3 -S1; laminectomies L4-5, L5-S1, R hip arthroplasty - 2011,  hypertension, tobacco use, left knee OA, bladder surgery-InterStim, GERD, ulcerative colitis.       Current Medications:   Diclofenac 50 mg twice daily as needed  Citalopram      Past Medications:    Past Modalities:  TENS:       YES          Physical Therapy Within The Last 6 Months     no  Psychotherapy     no  Massage Therapy      no    Patient Complains Of:  Uro-Fecal Incontinence No (chronic urinary continence)   Weight Gain/Loss  no  Fever/Chills   no  Weakness   no      PEG Assessment   What number best describes your pain on average in the past week?7  What number best describes how, during the past week, pain has interfered with your enjoyment of life?7  What number best describes how, during the past week, pain has interfered with your general activity?  7        Current Outpatient Medications:   •  amLODIPine (NORVASC) 10 MG tablet, Take 1 tablet by mouth Daily With Dinner., Disp: 90 tablet, Rfl: 1  •  Apremilast (Otezla) 10 & 20 & 30 MG tablet therapy pack, , Disp: , Rfl:   •  atorvastatin (LIPITOR) 20 MG tablet, Take 1 tablet by mouth  Every Night., Disp: 90 tablet, Rfl: 1  •  Calcium Carbonate (CALCIUM 600 PO), Take  by mouth., Disp: , Rfl:   •  carvedilol (COREG) 25 MG tablet, Take 2 tablets by mouth 2 (Two) Times a Day With Meals., Disp: 360 tablet, Rfl: 1  •  Cholecalciferol (VITAMIN D3) 125 MCG (5000 UT) tablet tablet, Take 1 tablet by mouth., Disp: , Rfl:   •  citalopram (CeleXA) 20 MG tablet, Take 1 tablet by mouth Daily., Disp: 90 tablet, Rfl: 1  •  colestipol (COLESTID) 1 g tablet, , Disp: , Rfl:   •  diclofenac (VOLTAREN) 50 MG EC tablet, Take 1 tablet by mouth 2 (Two) Times a Day As Needed (Moderate Pain)., Disp: 60 tablet, Rfl: 1  •  Diclofenac Sodium (VOLTAREN) 1 % gel gel, Apply 4 g topically to the appropriate area as directed 4 (Four) Times a Day As Needed (Joint Pain)., Disp: 100 g, Rfl: 1  •  fluticasone (Flonase) 50 MCG/ACT nasal spray, 1 spray into the nostril(s) as directed by provider Daily. DO NOT SNIFF!!, Disp: 16 g, Rfl: 3  •  KRILL OIL PO, Take  by mouth., Disp: , Rfl:   •  lisinopril-hydrochlorothiazide (PRINZIDE,ZESTORETIC) 20-12.5 MG per tablet, Take 2 tablets by mouth Daily With Breakfast., Disp: 180 tablet, Rfl: 1  •  montelukast (SINGULAIR) 10 MG tablet, Take 10 mg by mouth Daily., Disp: , Rfl:   •  omeprazole (priLOSEC) 20 MG capsule, Take 1 capsule by mouth Daily., Disp: , Rfl:   •  sulfaSALAzine (AZULFIDINE) 500 MG tablet, Take 2 tablets by mouth 2 (Two) Times a Day., Disp: , Rfl:   •  vitamin E 100 UNIT capsule, Take 400 Units by mouth Daily., Disp: , Rfl:     The following portions of the patient's history were reviewed and updated as appropriate: allergies, current medications, past family history, past medical history, past social history, past surgical history, and problem list.      REVIEW OF PERTINENT MEDICAL DATA    Past Medical History:   Diagnosis Date   • Allergic    • GERD (gastroesophageal reflux disease)    • Hepatic hemangioma 10/2017    Seen on MRI of the liver   • Hyperlipidemia    • Hypertension  "   • Pneumonia    • Ulcerative colitis (CMS/HCC) 2018    Followed by GI, Dr. Vanda Sotomayor; treated with sulfasalazine     Past Surgical History:   Procedure Laterality Date   • ANKLE SURGERY Right     broken right ankle    • BACK SURGERY     • BLADDER SURGERY      bladder sling    • BREAST AUGMENTATION Bilateral  & 2017    recurrent capsular contracture   • INTERSTIM PLACEMENT Right 2020    for bladder and bowel control - [employer]   • SKIN BIOPSY  2019    Shave biopsy of frontal scalp; irritated verruca vulgaris   • TOE SURGERY      big toe left foot    • TOTAL HIP ARTHROPLASTY Right    • TUBAL ABDOMINAL LIGATION Bilateral      Family History   Problem Relation Age of Onset   • Hypertension Mother    • Hypertension Father    • Colon cancer Sister 43     Social History     Socioeconomic History   • Marital status:      Spouse name: Not on file   • Number of children: Not on file   • Years of education: Not on file   • Highest education level: Not on file   Tobacco Use   • Smoking status: Former Smoker     Packs/day: 1.00     Years: 30.00     Pack years: 30.00     Types: Cigarettes     Quit date:      Years since quittin.5   • Smokeless tobacco: Never Used   Vaping Use   • Vaping Use: Never used   Substance and Sexual Activity   • Alcohol use: Yes   • Drug use: No         Review of Systems   Musculoskeletal: Positive for arthralgias and back pain.         Vitals:    21 1455   BP: 143/76   Pulse: 85   Resp: 16   SpO2: 93%   Weight: 74.8 kg (165 lb)   Height: 165.1 cm (65\")   PainSc:   7         Objective   Physical Exam  Musculoskeletal:        Legs:    Neurological:      Deep Tendon Reflexes:      Reflex Scores:       Patellar reflexes are 2+ on the right side and 2+ on the left side.       Achilles reflexes are 2+ on the right side and 2+ on the left side.     Comments: Motor strength 5/5 b/l LE  Sensory intact b/l LE             Imaging Reviewed:  CT lumbar " spine-7/20/2021-independently reviewed  [id number] central stenosis or facet changes.  L3-L4 -facet arthritis. No stenosis  B4-R9-ecgwwgmat neuroforaminal narrowing. Greater than left due to spurring and facet arthritis. There may be nerve impingement especially in the right side.  K1-K8-fzjdhfnnd neuroforaminal narrowing secondary to prominent endplate spurring. Mild facet arthritis. No spinal stenosis.        Assessment:    1. DDD (degenerative disc disease), lumbar    2. Lumbar spondylosis    3. History of lumbar laminectomy for spinal cord decompression         Plan:     Plan:  1. Defer UDS for now.   2. We discussed trying a course of formal physical therapy.  Physical therapy can help strengthen and stretch the muscles around the joints. Continue to be as active as possible. Start physical therapy as it will help generalized pain and follow up with HEP.   3. Patient has pain in the lower back with referred pain in the leg, patient has failed conservative therapy including PT and pharmacological management for more than 6 weeks and pain interferes with activities of daily living. MRI shows b/l neuro foraminal narrowing at L5-S1. Discussed Caudal INGE due to hx of laminectomies. Discussed the possibility of infection, bleeding, nerve damage, post dural puncture headache, increased pain, paraplegia. Patient understands and agrees.   4. Reviewed previous notes from Neurosurgery at [place].       RTC for injection and then 3 week follow up.     Uche Huizar DO  Pain Management   [facility]         INSPECT REPORT    As part of the patient's treatment plan, I may be prescribing controlled substances. The patient has been made aware of appropriate use of such medications, including potential risk of somnolence, limited ability to drive and/or work safely, and the potential for dependence or overdose. It has also been made clear that these medications are for use by this patient only, without concomitant use of  alcohol or other substances unless prescribed.     Patient has completed prescribing agreement detailing terms of continued prescribing of controlled substances, including monitoring INSPECT reports, urine drug screening, and pill counts if necessary. The patient is aware that inappropriate use will results in cessation of prescribing such medications.    INSPECT report has been reviewed and scanned into the patient's chart.      EMR Dragon/Transcription Disclaimer:   Much of this encounter note is an electronic transcription/translation of spoken language to printed text. The electronic translation of spoken language may permit erroneous, or at times, nonsensical words or phrases to be inadvertently transcribed; Although I have reviewed the note for such errors, some may still exist.

## 2021-07-28 ENCOUNTER — OFFICE VISIT (OUTPATIENT)
Dept: PAIN MEDICINE | Facility: CLINIC | Age: 71
End: 2021-07-28

## 2021-07-28 VITALS
RESPIRATION RATE: 16 BRPM | WEIGHT: 165 LBS | SYSTOLIC BLOOD PRESSURE: 143 MMHG | BODY MASS INDEX: 27.49 KG/M2 | OXYGEN SATURATION: 93 % | DIASTOLIC BLOOD PRESSURE: 76 MMHG | HEART RATE: 85 BPM | HEIGHT: 65 IN

## 2021-07-28 DIAGNOSIS — Z98.890 HISTORY OF LUMBAR LAMINECTOMY FOR SPINAL CORD DECOMPRESSION: ICD-10-CM

## 2021-07-28 DIAGNOSIS — M47.816 LUMBAR SPONDYLOSIS: ICD-10-CM

## 2021-07-28 DIAGNOSIS — M51.36 DDD (DEGENERATIVE DISC DISEASE), LUMBAR: Primary | ICD-10-CM

## 2021-07-28 PROCEDURE — 99204 OFFICE O/P NEW MOD 45 MIN: CPT | Performed by: STUDENT IN AN ORGANIZED HEALTH CARE EDUCATION/TRAINING PROGRAM

## 2021-08-03 ENCOUNTER — HOSPITAL ENCOUNTER (OUTPATIENT)
Dept: PAIN MEDICINE | Facility: HOSPITAL | Age: 71
Discharge: HOME | End: 2021-08-03

## 2021-08-03 VITALS
HEIGHT: 65 IN | WEIGHT: 165 LBS | BODY MASS INDEX: 27.49 KG/M2 | SYSTOLIC BLOOD PRESSURE: 148 MMHG | TEMPERATURE: 97.8 F | DIASTOLIC BLOOD PRESSURE: 78 MMHG | HEART RATE: 71 BPM | OXYGEN SATURATION: 94 % | RESPIRATION RATE: 16 BRPM

## 2021-08-03 DIAGNOSIS — R52 PAIN: ICD-10-CM

## 2021-08-03 DIAGNOSIS — M51.36 DDD (DEGENERATIVE DISC DISEASE), LUMBAR: Primary | ICD-10-CM

## 2021-08-03 PROCEDURE — 62323 NJX INTERLAMINAR LMBR/SAC: CPT | Performed by: STUDENT IN AN ORGANIZED HEALTH CARE EDUCATION/TRAINING PROGRAM

## 2021-08-03 PROCEDURE — 25010000002 METHYLPREDNISOLONE PER 80 MG: Performed by: STUDENT IN AN ORGANIZED HEALTH CARE EDUCATION/TRAINING PROGRAM

## 2021-08-03 PROCEDURE — 77003 FLUOROGUIDE FOR SPINE INJECT: CPT

## 2021-08-03 PROCEDURE — 0 IOPAMIDOL 41 % SOLUTION: Performed by: STUDENT IN AN ORGANIZED HEALTH CARE EDUCATION/TRAINING PROGRAM

## 2021-08-03 RX ORDER — METHYLPREDNISOLONE ACETATE 80 MG/ML
80 INJECTION, SUSPENSION INTRA-ARTICULAR; INTRALESIONAL; INTRAMUSCULAR; SOFT TISSUE ONCE
Status: COMPLETED | OUTPATIENT
Start: 2021-08-03 | End: 2021-08-03

## 2021-08-03 RX ADMIN — IOPAMIDOL 3 ML: 408 INJECTION, SOLUTION INTRATHECAL at 15:27

## 2021-08-03 RX ADMIN — METHYLPREDNISOLONE ACETATE 80 MG: 80 INJECTION, SUSPENSION INTRA-ARTICULAR; INTRALESIONAL; INTRAMUSCULAR; SOFT TISSUE at 15:28

## 2021-08-03 NOTE — H&P
H and P reviewed from previous visit and no changes to patient's clinical presentation. Will proceed with procedure as planned. Patient denies history of DM and being on blood thinners.    Uche Huizar DO  Pain Management   [facility]

## 2021-08-03 NOTE — PROCEDURES
PREOPERATIVE DIAGNOS  1.         Lumbar DDD     POSTOPERATIVE DIAGNOSIS:  1.  Same     PROCEDURE:  Caudal Epidural Steroid Injection      PROCEDURE NOTE:  After obtaining written informed consent patient was taken to the procedure room. Pre-procedure blood pressure and pulse were stable and recorded in patients clinic chart.      The patient was placed in the prone position on fluoroscopy table.  The lower back was prepped with chloraprep times three and draped in the usual sterile fashion.  The skin over the sacral hiatus was identified under fluoroscopic guidance and infiltrated with 1% lidocaine for local anesthesia via 25 gauge needle.  An 20-g spinal needle was used to access the epidural space under fluoroscopic guidance. 2 cc of the omnipaque dye was injected through the catheter with good spread seen from L5-S2 area.  80 mg of depomedrol  with 4 cc of saline as injected. There was no evidence of CSF, paresthesia or heme. The needle was cleared with preservative free local anesthetic and removed. Skin was cleaned and a sterile dressing was applied.     Following the procedure the patient's vital signs were stable. The patient was discharged home in good condition after being given discharge instructions.     COMPLICATIONS: None     Uche Huizar DO  Pain Management   [facility]

## 2021-08-04 ENCOUNTER — TELEPHONE (OUTPATIENT)
Dept: PAIN MEDICINE | Facility: HOSPITAL | Age: 71
End: 2021-08-04

## 2021-08-04 NOTE — TELEPHONE ENCOUNTER
Post op procedure call made. No answer but message was left on voicemail for return call if she has any questions or concerns.

## 2021-08-18 DIAGNOSIS — I10 ESSENTIAL HYPERTENSION: ICD-10-CM

## 2021-08-19 RX ORDER — LISINOPRIL AND HYDROCHLOROTHIAZIDE 20; 12.5 MG/1; MG/1
2 TABLET ORAL
Qty: 180 TABLET | Refills: 1 | Status: SHIPPED | OUTPATIENT
Start: 2021-08-19 | End: 2022-02-20

## 2021-08-19 RX ORDER — CITALOPRAM 20 MG/1
20 TABLET ORAL DAILY
Qty: 90 TABLET | Refills: 1 | Status: SHIPPED | OUTPATIENT
Start: 2021-08-19 | End: 2022-03-16

## 2021-09-01 NOTE — PROGRESS NOTES
Subjective   Meliza Red is a 71 y.o. female is here for follow up for lower back pain. Patient was last seen on 8/7/2021 for caudal INGE with 80% pain relief. Her main complaint is pain when she rides car for longer period of time and that causes radicular pain to left leg.     On last visit:     Lower back pain is 0/10, 7/10 when driving car.   Pain is throbbing, pressure in nature.  Pain is referred to left leg and dorsum of left foot.  Denies any numbness tingling, weakness.   Leg pain bothers her more than any back pain.  Pain is constant.  Pain is improved by diclofenac and being in recliner.  Pain is worse with sitting in car for longer periods.         PHQ-9- 4  SOAPP- 5     PMH:   Back surgeries x2 (2011) - lumbar disectomy/ decompression L3 -S1; laminectomies L4-5, L5-S1, R hip arthroplasty - 2011,  hypertension, tobacco use, left knee OA, bladder surgery-InterStim, GERD, ulcerative colitis.         Current Medications:   Diclofenac 50 mg twice daily as needed  Citalopram      Hx: referred by Dr. Fermin, Ana Harrington MD..  Her pain started about 2 months ago and has been getting worse since then.     Past Medications:     Past Modalities:  TENS:                                                                          YES                                               Physical Therapy Within The Last 6 Months              no  Psychotherapy                                                            no  Massage Therapy                                                       no     Patient Complains Of:  Uro-Fecal Incontinence          No (chronic urinary continence)   Weight Gain/Loss                   no  Fever/Chills                             no  Weakness                               no         Previous Injection:   8/7/2021-caudal INGE-      Current Outpatient Medications:   •  amLODIPine (NORVASC) 10 MG tablet, Take 1 tablet by mouth Daily With Dinner., Disp: 90 tablet, Rfl: 1  •  atorvastatin  (LIPITOR) 20 MG tablet, Take 1 tablet by mouth Every Night., Disp: 90 tablet, Rfl: 1  •  Calcium Carbonate (CALCIUM 600 PO), Take  by mouth., Disp: , Rfl:   •  carvedilol (COREG) 25 MG tablet, Take 2 tablets by mouth 2 (Two) Times a Day With Meals., Disp: 360 tablet, Rfl: 1  •  Cholecalciferol (VITAMIN D3) 125 MCG (5000 UT) tablet tablet, Take 1 tablet by mouth., Disp: , Rfl:   •  citalopram (CeleXA) 20 MG tablet, Take 1 tablet by mouth Daily., Disp: 90 tablet, Rfl: 1  •  colestipol (COLESTID) 1 g tablet, , Disp: , Rfl:   •  diclofenac (VOLTAREN) 50 MG EC tablet, Take 1 tablet by mouth 2 (Two) Times a Day As Needed (Moderate Pain)., Disp: 60 tablet, Rfl: 1  •  Diclofenac Sodium (VOLTAREN) 1 % gel gel, Apply 4 g topically to the appropriate area as directed 4 (Four) Times a Day As Needed (Joint Pain)., Disp: 100 g, Rfl: 1  •  folic acid (FOLVITE) 1 MG tablet, Take 1 mg by mouth Daily., Disp: , Rfl:   •  KRILL OIL PO, Take  by mouth., Disp: , Rfl:   •  lisinopril-hydrochlorothiazide (PRINZIDE,ZESTORETIC) 20-12.5 MG per tablet, Take 2 tablets by mouth Daily With Breakfast., Disp: 180 tablet, Rfl: 1  •  montelukast (SINGULAIR) 10 MG tablet, Take 10 mg by mouth Daily., Disp: , Rfl:   •  omeprazole (priLOSEC) 20 MG capsule, Take 1 capsule by mouth Daily., Disp: , Rfl:   •  sulfaSALAzine (AZULFIDINE) 500 MG tablet, Take 2 tablets by mouth 2 (Two) Times a Day., Disp: , Rfl:   •  vitamin E 100 UNIT capsule, Take 400 Units by mouth Daily., Disp: , Rfl:     The following portions of the patient's history were reviewed and updated as appropriate: allergies, current medications, past family history, past medical history, past social history, past surgical history, and problem list.      REVIEW OF PERTINENT MEDICAL DATA    Past Medical History:   Diagnosis Date   • Allergic    • GERD (gastroesophageal reflux disease)    • Hepatic hemangioma 10/2017    Seen on MRI of the liver   • Hyperlipidemia    • Hypertension    • Leg pain   "  • Pneumonia    • Ulcerative colitis (CMS/HCC) 2018    Followed by GI, Dr. Vanda Sotomayor; treated with sulfasalazine     Past Surgical History:   Procedure Laterality Date   • ANKLE SURGERY Right     broken right ankle    • BACK SURGERY     • BLADDER SURGERY      bladder sling    • BREAST AUGMENTATION Bilateral  & 2017    recurrent capsular contracture   • INTERSTIM PLACEMENT Right 2020    for bladder and bowel control - [employer]   • SKIN BIOPSY  2019    Shave biopsy of frontal scalp; irritated verruca vulgaris   • TOE SURGERY      big toe left foot    • TOTAL HIP ARTHROPLASTY Right    • TUBAL ABDOMINAL LIGATION Bilateral      Family History   Problem Relation Age of Onset   • Hypertension Mother    • Hypertension Father    • Colon cancer Sister 43     Social History     Socioeconomic History   • Marital status:      Spouse name: Not on file   • Number of children: Not on file   • Years of education: Not on file   • Highest education level: Not on file   Tobacco Use   • Smoking status: Former Smoker     Packs/day: 1.00     Years: 30.00     Pack years: 30.00     Types: Cigarettes     Quit date:      Years since quittin.6   • Smokeless tobacco: Never Used   Vaping Use   • Vaping Use: Never used   Substance and Sexual Activity   • Alcohol use: Yes   • Drug use: No         Review of Systems      Vitals:    21 1454   BP: 126/79   Pulse: 85   Resp: 16   SpO2: 94%   Weight: 74.8 kg (165 lb)   Height: 165.1 cm (65\")   PainSc: 0-No pain         Objective   Physical Exam  Musculoskeletal:        Legs:    Neurological:      Deep Tendon Reflexes:      Reflex Scores:       Patellar reflexes are 2+ on the right side and 2+ on the left side.       Achilles reflexes are 2+ on the right side and 2+ on the left side.     Comments: Motor strength 5/5 b/l LE  Sensory intact b/l LE             Imaging Reviewed:  CT lumbar spine-2021-independently reviewed  [id number]-[id number] central stenosis or " facet changes.  L3-L4 -facet arthritis. No stenosis  H1-F4-ptyoushyw neuroforaminal narrowing. Greater than left due to spurring and facet arthritis. There may be nerve impingement especially in the right side.  M9-C6-zdfvwlprp neuroforaminal narrowing secondary to prominent endplate spurring. Mild facet arthritis. No spinal stenosis.      Assessment:    1. DDD (degenerative disc disease), lumbar    2. Lumbar spondylosis    3. History of lumbar laminectomy for spinal cord decompression            1. DDD (degenerative disc disease), lumbar    2. Lumbar spondylosis    3. History of lumbar laminectomy for spinal cord decompression          Plan:     1. Defer UDS for now.   2. We discussed trying a course of formal physical therapy.  Physical therapy can help strengthen and stretch the muscles around the joints. Continue to be as active as possible. Start physical therapy as it will help generalized pain and follow up with HEP.   3. Patient has pain in the lower back with referred pain in the leg, patient has failed conservative therapy including PT and pharmacological management for more than 6 weeks and pain interferes with activities of daily living. MRI shows b/l neuro foraminal narrowing at L5-S1. Discussed repeat Caudal INGE due to hx of laminectomies. Discussed the possibility of infection, bleeding, nerve damage, post dural puncture headache, increased pain, paraplegia. Patient understands and agrees.           RTC for injection and then 4 week follow up.      Uche Huizar DO  Pain Management   [facility]               INSPECT REPORT    As part of the patient's treatment plan, I may be prescribing controlled substances. The patient has been made aware of appropriate use of such medications, including potential risk of somnolence, limited ability to drive and/or work safely, and the potential for dependence or overdose. It has also been made clear that these medications are for use by this patient only,  without concomitant use of alcohol or other substances unless prescribed.     Patient has completed prescribing agreement detailing terms of continued prescribing of controlled substances, including monitoring INSPECT reports, urine drug screening, and pill counts if necessary. The patient is aware that inappropriate use will results in cessation of prescribing such medications.    INSPECT report has been reviewed and scanned into the patient's chart.

## 2021-09-02 ENCOUNTER — OFFICE VISIT (OUTPATIENT)
Dept: PAIN MEDICINE | Facility: CLINIC | Age: 71
End: 2021-09-02

## 2021-09-02 VITALS
OXYGEN SATURATION: 94 % | DIASTOLIC BLOOD PRESSURE: 79 MMHG | HEART RATE: 85 BPM | BODY MASS INDEX: 27.49 KG/M2 | SYSTOLIC BLOOD PRESSURE: 126 MMHG | RESPIRATION RATE: 16 BRPM | HEIGHT: 65 IN | WEIGHT: 165 LBS

## 2021-09-02 DIAGNOSIS — M47.816 LUMBAR SPONDYLOSIS: ICD-10-CM

## 2021-09-02 DIAGNOSIS — Z98.890 HISTORY OF LUMBAR LAMINECTOMY FOR SPINAL CORD DECOMPRESSION: ICD-10-CM

## 2021-09-02 DIAGNOSIS — M51.36 DDD (DEGENERATIVE DISC DISEASE), LUMBAR: Primary | ICD-10-CM

## 2021-09-02 PROCEDURE — 99213 OFFICE O/P EST LOW 20 MIN: CPT | Performed by: STUDENT IN AN ORGANIZED HEALTH CARE EDUCATION/TRAINING PROGRAM

## 2021-09-02 RX ORDER — FOLIC ACID 1 MG/1
1 TABLET ORAL DAILY
COMMUNITY

## 2021-09-05 DIAGNOSIS — G89.29 CHRONIC LEFT-SIDED LOW BACK PAIN WITHOUT SCIATICA: ICD-10-CM

## 2021-09-05 DIAGNOSIS — M54.50 CHRONIC LEFT-SIDED LOW BACK PAIN WITHOUT SCIATICA: ICD-10-CM

## 2021-09-07 ENCOUNTER — OFFICE VISIT (OUTPATIENT)
Dept: FAMILY MEDICINE CLINIC | Facility: CLINIC | Age: 71
End: 2021-09-07

## 2021-09-07 VITALS
HEIGHT: 65 IN | BODY MASS INDEX: 27.49 KG/M2 | TEMPERATURE: 98.6 F | WEIGHT: 165 LBS | OXYGEN SATURATION: 94 % | HEART RATE: 106 BPM | DIASTOLIC BLOOD PRESSURE: 72 MMHG | SYSTOLIC BLOOD PRESSURE: 132 MMHG

## 2021-09-07 DIAGNOSIS — Z00.00 ENCOUNTER FOR MEDICARE ANNUAL WELLNESS EXAM: ICD-10-CM

## 2021-09-07 DIAGNOSIS — K21.9 HIATAL HERNIA WITH GERD: ICD-10-CM

## 2021-09-07 DIAGNOSIS — K44.9 HIATAL HERNIA WITH GERD: ICD-10-CM

## 2021-09-07 DIAGNOSIS — L40.9 PSORIASIS: ICD-10-CM

## 2021-09-07 DIAGNOSIS — G47.33 OSA ON CPAP: ICD-10-CM

## 2021-09-07 DIAGNOSIS — K51.80 OTHER ULCERATIVE COLITIS WITHOUT COMPLICATION (HCC): ICD-10-CM

## 2021-09-07 DIAGNOSIS — K76.0 HEPATIC STEATOSIS: ICD-10-CM

## 2021-09-07 DIAGNOSIS — I10 ESSENTIAL HYPERTENSION: Primary | ICD-10-CM

## 2021-09-07 DIAGNOSIS — M85.80 OSTEOPENIA AFTER MENOPAUSE: ICD-10-CM

## 2021-09-07 DIAGNOSIS — Z78.0 OSTEOPENIA AFTER MENOPAUSE: ICD-10-CM

## 2021-09-07 DIAGNOSIS — E78.2 MIXED HYPERLIPIDEMIA: ICD-10-CM

## 2021-09-07 DIAGNOSIS — Z99.89 OSA ON CPAP: ICD-10-CM

## 2021-09-07 PROCEDURE — G0439 PPPS, SUBSEQ VISIT: HCPCS | Performed by: FAMILY MEDICINE

## 2021-09-07 RX ORDER — BETAMETHASONE DIPROPIONATE 0.5 MG/G
CREAM TOPICAL 2 TIMES DAILY
Qty: 45 G | Refills: 3 | Status: SHIPPED | OUTPATIENT
Start: 2021-09-07 | End: 2022-03-16

## 2021-09-07 RX ORDER — MULTIPLE VITAMINS W/ MINERALS TAB 9MG-400MCG
1 TAB ORAL DAILY
COMMUNITY

## 2021-09-07 RX ORDER — TURMERIC ROOT EXTRACT 500 MG
1 TABLET ORAL DAILY
COMMUNITY

## 2021-09-07 NOTE — PROGRESS NOTES
The ABCs of the Annual Wellness Visit  Subsequent Medicare Wellness Visit    Chief Complaint   Patient presents with   • Medicare Wellness-subsequent      Subjective    History of Present Illness:  Meliza Red is a 71 y.o. female who presents for a Subsequent Medicare Wellness Visit.    The following portions of the patient's history were reviewed and   updated as appropriate: allergies, current medications, past family history, past medical history, past social history, past surgical history and problem list.     Compared to one year ago, the patient feels her physical   health is better.    Compared to one year ago, the patient feels her mental   health is the same.    Recent Hospitalizations:  She was not admitted to the hospital during the last year.       Current Medical Providers:  Patient Care Team:  Ana Fermin MD as PCP - General (Family Medicine)    Outpatient Medications Prior to Visit   Medication Sig Dispense Refill   • amLODIPine (NORVASC) 10 MG tablet Take 1 tablet by mouth Daily With Dinner. 90 tablet 1   • atorvastatin (LIPITOR) 20 MG tablet Take 1 tablet by mouth Every Night. 90 tablet 1   • Calcium Carbonate (CALCIUM 600 PO) Take  by mouth.     • carvedilol (COREG) 25 MG tablet Take 2 tablets by mouth 2 (Two) Times a Day With Meals. 360 tablet 1   • Cholecalciferol (VITAMIN D3) 125 MCG (5000 UT) tablet tablet Take 1 tablet by mouth.     • citalopram (CeleXA) 20 MG tablet Take 1 tablet by mouth Daily. 90 tablet 1   • colestipol (COLESTID) 1 g tablet      • diclofenac (VOLTAREN) 50 MG EC tablet Take 1 tablet by mouth 2 (Two) Times a Day As Needed (Moderate Pain). Take with food! 180 tablet 0   • Diclofenac Sodium (VOLTAREN) 1 % gel gel Apply 4 g topically to the appropriate area as directed 4 (Four) Times a Day As Needed (Joint Pain). 100 g 1   • folic acid (FOLVITE) 1 MG tablet Take 1 mg by mouth Daily.     • KRILL OIL PO Take  by mouth.     • lisinopril-hydrochlorothiazide  "(PRINZIDE,ZESTORETIC) 20-12.5 MG per tablet Take 2 tablets by mouth Daily With Breakfast. 180 tablet 1   • montelukast (SINGULAIR) 10 MG tablet Take 10 mg by mouth Daily.     • multivitamin with minerals (MULTIVITAMIN ADULT PO) Take 1 tablet by mouth Daily.     • omeprazole (priLOSEC) 20 MG capsule Take 1 capsule by mouth Daily.     • sulfaSALAzine (AZULFIDINE) 500 MG tablet Take 2 tablets by mouth 2 (Two) Times a Day.     • Turmeric 500 MG tablet Take 1 tablet by mouth Daily.     • vitamin E 100 UNIT capsule Take 400 Units by mouth Daily.       No facility-administered medications prior to visit.       No opioid medication identified on active medication list. I have reviewed chart for other potential  high risk medication/s and harmful drug interactions in the elderly.          Aspirin is not on active medication list.  Aspirin use is not indicated based on review of current medical condition/s. Risk of harm outweighs potential benefits.  .    Patient Active Problem List   Diagnosis   • Chronic back pain   • Degeneration of intervertebral disc of lumbosacral region   • Hiatal hernia with GERD   • Hypertension   • Insomnia   • Multiple-type hyperlipidemia   • Silicone leakage from breast implant   • Status post lumbar spine operation   • BEAR on CPAP   • Hepatic steatosis   • Multiple lung nodules on CT   • Ulcerative colitis (CMS/HCC)   • Diverticulosis of colon     Advance Care Planning   Advance Directive is on file.  ACP discussion was held with the patient during this visit. Copy of advance directive requested.          Objective       Vitals:    09/07/21 1457   BP: 132/72   BP Location: Left arm   Patient Position: Sitting   Cuff Size: Small Adult   Pulse: 106   Temp: 98.6 °F (37 °C)   TempSrc: Infrared   SpO2: 94%   Weight: 74.8 kg (165 lb)   Height: 165.1 cm (65\")     BMI Readings from Last 1 Encounters:   09/07/21 27.46 kg/m²   BMI is above normal parameters. Recommendations include: exercise counseling and " nutrition counseling    Does the patient have evidence of cognitive impairment? No    Physical Exam  Vitals reviewed.   Constitutional:       General: She is not in acute distress.     Appearance: She is well-developed. She is not diaphoretic.   HENT:      Head: Normocephalic and atraumatic.      Right Ear: Tympanic membrane and ear canal normal.      Left Ear: Tympanic membrane and ear canal normal.   Cardiovascular:      Rate and Rhythm: Normal rate and regular rhythm.   Pulmonary:      Effort: Pulmonary effort is normal.      Breath sounds: Normal breath sounds.   Abdominal:      General: Bowel sounds are normal.      Palpations: Abdomen is soft.      Tenderness: There is no abdominal tenderness.   Musculoskeletal:      Right lower leg: No edema.      Left lower leg: No edema.   Skin:     General: Skin is warm and dry.   Neurological:      Mental Status: She is alert and oriented to person, place, and time.   Psychiatric:         Mood and Affect: Mood normal.         Behavior: Behavior normal.                 HEALTH RISK ASSESSMENT    Smoking Status:  Social History     Tobacco Use   Smoking Status Former Smoker   • Packs/day: 1.00   • Years: 30.00   • Pack years: 30.00   • Types: Cigarettes   • Quit date:    • Years since quittin.6   Smokeless Tobacco Never Used     Alcohol Consumption:  Social History     Substance and Sexual Activity   Alcohol Use Yes    Comment: bourbon- couple daily      Fall Risk Screen:    STEADI Fall Risk Assessment was completed, and patient is at HIGH risk for falls. Assessment completed on:2021    Depression Screening:  PHQ-2/PHQ-9 Depression Screening 2021   Little interest or pleasure in doing things 0   Feeling down, depressed, or hopeless 0   Total Score 0       Health Habits and Functional and Cognitive Screening:  Functional & Cognitive Status 2021   Do you have difficulty preparing food and eating? No   Do you have difficulty bathing yourself, getting dressed  or grooming yourself? No   Do you have difficulty using the toilet? No   Do you have difficulty moving around from place to place? No   Do you have trouble with steps or getting out of a bed or a chair? No   Current Diet Unhealthy Diet   Dental Exam Up to date   Eye Exam Up to date   Exercise (times per week) 7 times per week   Current Exercises Include Walking;Stationary Bicycling/Spin Class   Current Exercise Activities Include -   Do you need help using the phone?  No   Are you deaf or do you have serious difficulty hearing?  No   Do you need help with transportation? No   Do you need help shopping? No   Do you need help preparing meals?  No   Do you need help with housework?  No   Do you need help with laundry? No   Do you need help taking your medications? No   Do you need help managing money? No   Do you ever drive or ride in a car without wearing a seat belt? No   Have you felt unusual stress, anger or loneliness in the last month? No   Who do you live with? Spouse   If you need help, do you have trouble finding someone available to you? No   Have you been bothered in the last four weeks by sexual problems? No   Do you have difficulty concentrating, remembering or making decisions? Yes       Age-appropriate Screening Schedule:  Refer to the list below for future screening recommendations based on patient's age, sex and/or medical conditions. Orders for these recommended tests are listed in the plan section. The patient has been provided with a written plan.    Health Maintenance   Topic Date Due   • ZOSTER VACCINE (1 of 2) Never done   • LIPID PANEL  07/10/2021   • INFLUENZA VACCINE  10/01/2021   • MAMMOGRAM  02/12/2023   • DXA SCAN  02/12/2023   • TDAP/TD VACCINES (2 - Td or Tdap) 08/05/2023              Assessment/Plan     CMS Preventative Services Quick Reference  Risk Factors Identified During Encounter  Cardiovascular Disease  Dementia/Memory   Depression/Dysphoria  Fall Risk-High or  Moderate  Immunizations Discussed/Encouraged (specific Immunizations; Shingrix  Inactivity/Sedentary  Obesity/Overweight   The above risks/problems have been discussed with the patient.  Follow up actions/plans if indicated are seen below in the Assessment/Plan Section.  Pertinent information has been shared with the patient in the After Visit Summary.    Problem List Items Addressed This Visit        Cardiac and Vasculature    Essential hypertension - Primary    Overview     BP at goal, <140/90.  Encouraged low-Na+ diet & 150 min exercise/week.   Continue amlodipine 10 mg, carvedilol 50 mg twice daily, and lisinopril-HCTZ 40-25 mg daily.   Monitoring renal function.         Relevant Orders    CBC Auto Differential    Comprehensive Metabolic Panel    Lipid Panel    Mixed hyperlipidemia    Overview     Reviewed lipid panel & 10-year ASCVD risk score.  Encouraged a diet rich in vegetables & 150 minutes of exercise weekly.  Continue atorvastatin 20 mg.         Relevant Orders    CBC Auto Differential    Comprehensive Metabolic Panel    Lipid Panel       Gastrointestinal Abdominal     Hiatal hernia with GERD    Overview     Moderate size hiatal hernia seen on chest CT in June 2020.  Continue omeprazole 20 mg daily.  Monitoring for osteoporosis with chronic PPI use.         Relevant Orders    CBC Auto Differential    Comprehensive Metabolic Panel    Hepatic steatosis    Overview     Encouraged diet rich in vegetables and 150 minutes exercise weekly.  Monitoring LFTs.  Followed by GI.         Relevant Orders    CBC Auto Differential    Comprehensive Metabolic Panel    Ulcerative colitis (CMS/HCC)    Overview     Continue sulfasalazine 1000 mg twice daily and colestipol 1 g daily..  Monitoring for blood dyscrasia and organ dysfunction.  Followed by GI.         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  Last DEXA scan was in February 2021. Repeat DEXA scan every 2 years.         Relevant Orders    CBC Auto Differential    Comprehensive Metabolic Panel    Vitamin D 25 Hydroxy       Skin    Psoriasis    Overview     Followed by dermatology.  Cannot afford Otezla.  Will try a course of topical steroids.         Relevant Medications    betamethasone dipropionate 0.05 % cream    Other Relevant Orders    CBC Auto Differential    Comprehensive Metabolic Panel       Sleep    BEAR on CPAP    Overview     Home sleep study revealed apnea- hypopnea index 39.    Patient spent 450 minutes with oxygen saturation below 90% which is 37% of total sleep time.  Patient is followed by pulmonology Dr. Graves.           Other Visit Diagnoses     Encounter for Medicare annual wellness exam        Relevant Orders    CBC Auto Differential    Comprehensive Metabolic Panel    Lipid Panel    Vitamin B12    Vitamin D 25 Hydroxy            Follow Up:   Return in about 6 months (around 3/7/2022) for Recheck BP & HLD & psoriasis.     An After Visit Summary and PPPS were given to the patient.           Lipid Panel (07/10/2020 15:03)  Vitamin B12 (07/10/2020 15:03)  Vitamin D 25 Hydroxy (07/10/2020 15:03)  CBC & Differential (11/24/2020 10:32)  Basic Metabolic Panel (11/24/2020 10:32)  Comprehensive Metabolic Panel (07/10/2020 15:03)

## 2021-09-07 NOTE — PATIENT INSTRUCTIONS
Mediterranean Diet  A Mediterranean diet refers to food and lifestyle choices that are based on the traditions of countries located on the [place]. This way of eating has been shown to help prevent certain conditions and improve outcomes for people who have chronic diseases, like kidney disease and heart disease.  What are tips for following this plan?  Lifestyle  · Cook and eat meals together with your family, when possible.  · Drink enough fluid to keep your urine clear or pale yellow.  · Be physically active every day. This includes:  ? Aerobic exercise like running or swimming.  ? Leisure activities like gardening, walking, or housework.  · Get 7-8 hours of sleep each night.  · If recommended by your health care provider, drink red wine in moderation. This means 1 glass a day for nonpregnant women and 2 glasses a day for men. A glass of wine equals 5 oz (150 mL).  Reading food labels    · Check the serving size of packaged foods. For foods such as rice and pasta, the serving size refers to the amount of cooked product, not dry.  · Check the total fat in packaged foods. Avoid foods that have saturated fat or trans fats.  · Check the ingredients list for added sugars, such as corn syrup.  Shopping  · At the grocery store, buy most of your food from the areas near the walls of the store. This includes:  ? Fresh fruits and vegetables (produce).  ? Grains, beans, nuts, and seeds. Some of these may be available in unpackaged forms or large amounts (in bulk).  ? Fresh seafood.  ? Poultry and eggs.  ? Low-fat dairy products.  · Buy whole ingredients instead of prepackaged foods.  · Buy fresh fruits and vegetables in-season from local farmers markets.  · Buy frozen fruits and vegetables in resealable bags.  · If you do not have access to quality fresh seafood, buy precooked frozen shrimp or canned fish, such as tuna, salmon, or sardines.  · Buy small amounts of raw or cooked vegetables, salads, or olives from  the deli or salad bar at your store.  · Stock your pantry so you always have certain foods on hand, such as olive oil, canned tuna, canned tomatoes, rice, pasta, and beans.  Cooking  · Cook foods with extra-virgin olive oil instead of using butter or other vegetable oils.  · Have meat as a side dish, and have vegetables or grains as your main dish. This means having meat in small portions or adding small amounts of meat to foods like pasta or stew.  · Use beans or vegetables instead of meat in common dishes like chili or lasagna.  · [place] with different cooking methods. Try roasting or broiling vegetables instead of steaming or sautéeing them.  · Add frozen vegetables to soups, stews, pasta, or rice.  · Add nuts or seeds for added healthy fat at each meal. You can add these to yogurt, salads, or vegetable dishes.  · Marinate fish or vegetables using olive oil, lemon juice, garlic, and fresh herbs.  Meal planning    · Plan to eat 1 vegetarian meal one day each week. Try to work up to 2 vegetarian meals, if possible.  · Eat seafood 2 or more times a week.  · Have healthy snacks readily available, such as:  ? Vegetable sticks with hummus.  ? Greek yogurt.  ? Fruit and nut trail mix.  · Eat balanced meals throughout the week. This includes:  ? Fruit: 2-3 servings a day  ? Vegetables: 4-5 servings a day  ? Low-fat dairy: 2 servings a day  ? Fish, poultry, or lean meat: 1 serving a day  ? Beans and legumes: 2 or more servings a week  ? Nuts and seeds: 1-2 servings a day  ? Whole grains: 6-8 servings a day  ? Extra-virgin olive oil: 3-4 servings a day  · Limit red meat and sweets to only a few servings a month  What are my food choices?  · Mediterranean diet  ? Recommended  § Grains: Whole-grain pasta. Brown rice. Bulgar wheat. Polenta. Couscous. Whole-wheat bread. Oatmeal. Quinoa.  § Vegetables: Artichokes. Beets. Broccoli. Cabbage. Carrots. Eggplant. Green beans. Chard. Kale. Spinach. Onions. Leeks. Peas. Squash.  Tomatoes. Peppers. Radishes.  § Fruits: Apples. Apricots. Avocado. Berries. Bananas. Cherries. Dates. Figs. Grapes. Mian. Melon. Oranges. Peaches. Plums. Pomegranate.  § Meats and other protein foods: Beans. Almonds. Sunflower seeds. Pine nuts. Peanuts. Cod. [place]. Scallops. Shrimp. Tuna. Tilapia. Clams. Oysters. Eggs.  § Dairy: Low-fat milk. Cheese. Greek yogurt.  § Beverages: Water. Red wine. Herbal tea.  § Fats and oils: Extra virgin olive oil. Avocado oil. Grape seed oil.  § Sweets and desserts: Greek yogurt with honey. Baked apples. Poached pears. Trail mix.  § Seasoning and other foods: Basil. Cilantro. Coriander. Cumin. Mint. Parsley. [place]. Rosemary. Tarragon. Garlic. Oregano. Thyme. Pepper. Balsalmic vinegar. Tahini. Hummus. Tomato sauce. Olives. Mushrooms.  ? Limit these  § Grains: Prepackaged pasta or rice dishes. Prepackaged cereal with added sugar.  § Vegetables: Deep fried potatoes (french fries).  § Fruits: Fruit canned in syrup.  § Meats and other protein foods: Beef. Pork. Lamb. Poultry with skin. Hot dogs. [place].  § Dairy: Ice cream. Sour cream. Whole milk.  § Beverages: Juice. Sugar-sweetened soft drinks. Beer. Liquor and spirits.  § Fats and oils: Butter. Canola oil. Vegetable oil. Beef fat (tallow). Lard.  § Sweets and desserts: Cookies. Cakes. Pies. Candy.  § Seasoning and other foods: Mayonnaise. Premade sauces and marinades.  The items listed may not be a complete list. Talk with your dietitian about what dietary choices are right for you.  Summary  · The Mediterranean diet includes both food and lifestyle choices.  · Eat a variety of fresh fruits and vegetables, beans, nuts, seeds, and whole grains.  · Limit the amount of red meat and sweets that you eat.  · Talk with your health care provider about whether it is safe for you to drink red wine in moderation. This means 1 glass a day for nonpregnant women and 2 glasses a day for men. A glass of wine equals 5 oz (150 mL).  This information  is not intended to replace advice given to you by your health care provider. Make sure you discuss any questions you have with your health care provider.  Document Revised: 08/17/2017 Document Reviewed: 08/10/2017  [employer] Patient Education © 2020 [employer]      Exercising to Stay Healthy  To become healthy and stay healthy, it is recommended that you do moderate-intensity and vigorous-intensity exercise. You can tell that you are exercising at a moderate intensity if your heart starts beating faster and you start breathing faster but can still hold a conversation. You can tell that you are exercising at a vigorous intensity if you are breathing much harder and faster and cannot hold a conversation while exercising.  Exercising regularly is important. It has many health benefits, such as:  · Improving overall fitness, flexibility, and endurance.  · Increasing bone density.  · Helping with weight control.  · Decreasing body fat.  · Increasing muscle strength.  · Reducing stress and tension.  · Improving overall health.  How often should I exercise?  Choose an activity that you enjoy, and set realistic goals. Your health care provider can help you make an activity plan that works for you.  Exercise regularly as told by your health care provider. This may include:  · Doing strength training two times a week, such as:  ? Lifting weights.  ? Using resistance bands.  ? Push-ups.  ? Sit-ups.  ? Yoga.  · Doing a certain intensity of exercise for a given amount of time. Choose from these options:  ? A total of 150 minutes of moderate-intensity exercise every week.  ? A total of 75 minutes of vigorous-intensity exercise every week.  ? A mix of moderate-intensity and vigorous-intensity exercise every week.  Children, pregnant women, people who have not exercised regularly, people who are overweight, and older adults may need to talk with a health care provider about what activities are safe to do. If you have a medical  condition, be sure to talk with your health care provider before you start a new exercise program.  What are some exercise ideas?  Moderate-intensity exercise ideas include:  · Walking 1 mile (1.6 km) in about 15 minutes.  · Biking.  · Hiking.  · Golfing.  · Dancing.  · Water aerobics.  Vigorous-intensity exercise ideas include:  · Walking 4.5 miles (7.2 km) or more in about 1 hour.  · Jogging or running 5 miles (8 km) in about 1 hour.  · Biking 10 miles (16.1 km) or more in about 1 hour.  · Lap swimming.  · Roller-skating or in-line skating.  · Cross-country skiing.  · Vigorous competitive sports, such as football, basketball, and soccer.  · Jumping rope.  · Aerobic dancing.  What are some everyday activities that can help me to get exercise?  · Yard work, such as:  ? Pushing a .  ? Raking and bagging leaves.  · Washing your car.  · Pushing a stroller.  · Shoveling snow.  · Gardening.  · Washing windows or floors.  How can I be more active in my day-to-day activities?  · Use stairs instead of an elevator.  · Take a walk during your lunch break.  · If you drive, park your car farther away from your work or school.  · If you take public transportation, get off one stop early and walk the rest of the way.  · Stand up or walk around during all of your indoor phone calls.  · Get up, stretch, and walk around every 30 minutes throughout the day.  · Enjoy exercise with a friend. Support to continue exercising will help you keep a regular routine of activity.  What guidelines can I follow while exercising?  · Before you start a new exercise program, talk with your health care provider.  · Do not exercise so much that you hurt yourself, feel dizzy, or get very short of breath.  · Wear comfortable clothes and wear shoes with good support.  · Drink plenty of water while you exercise to prevent dehydration or heat stroke.  · Work out until your breathing and your heartbeat get faster.  Where to find more  information  · [place] Department of Health and Human Services: www.hhs.gov  · Centers for Disease Control and Prevention (CDC): www.cdc.gov  Summary  · Exercising regularly is important. It will improve your overall fitness, flexibility, and endurance.  · Regular exercise also will improve your overall health. It can help you control your weight, reduce stress, and improve your bone density.  · Do not exercise so much that you hurt yourself, feel dizzy, or get very short of breath.  · Before you start a new exercise program, talk with your health care provider.  This information is not intended to replace advice given to you by your health care provider. Make sure you discuss any questions you have with your health care provider.  Document Revised: 11/30/2018 Document Reviewed: 11/08/2018  Elsevier Patient Education © 2021 [employer]       ADVANCE CARE PLANNING  Conversations that Matter  PLANNING GUIDE    LOOKING BACK ...  Who we are, what we believe, and what we value are all shaped by experiences we have had. [place], family traditions, jobs and friends affect us deeply.    Has anything happened in your past that shaped your feelings about medical treatment?    Think about an experience you may have had with a family member or friend who was faced with a decision about medical care near the end of life. What was positive about that experience? What do you wish would have been done differently?    HERE AND NOW ...  Do you have any significant health problems now? What kinds of things bring you such lilli that, should a health problem prevent you from doing them any more, life would have little meaning? What short- or long-term goals do you have? How might medical treatment help you or hinder you in accomplishing those goals?    WHAT ABOUT TOMORROW?  What significant health problems do you fear may affect you in the future? How do you feel about the possibility of having to go to a nursing home? How would decisions  "be made if you could not make them?    WHO SHOULD MAKE DECISIONS?  An important part of planning is to consider whether or not you could appoint someone to make your healthcare decisions if you could not make them yourself. Many people select a close family member, but you are free to pick anyone you think could best represent you. Whoever you appoint should have all of the following qualifications:    • Can be trusted.  • Is willing to accept this responsibility.  • Is willing to follow the values and instructions you have discussed.  • Is able to make complex, difficult decisions.    It is helpful, but not required, to appoint one or more alternate persons in case your first choice becomes unable or unwilling to represent you. It is best if only one person has authority at a time, but you can instruct your representatives to discuss decisions together if time permits.    WHAT FUTURE DECISIONS NEED TO BE CONSIDERED?  Providing instructions for future healthcare decisions may seem like an impossible task. How can anyone plan for all the possibilities? You cannot … but you do not have to.    You need to plan for situations where you:  1. Become unexpectedly incapable of making your own decisions  2. It is clear you will have little or no recovery, and  3. The injury or loss of function is significant.    Such a situation might arise because of an injury to the brain from an accident, a stroke, or a slowly progressive disease such as Alzheimer's.    To plan for this type of situation, many people state, \"If I'm going to be a vegetable, let me go,\" or \"No heroics,\" or \"Don't keep me alive on machines.\" While these remarks are a beginning, they simply are too vague to guide decision-making.    You need to completely describe under what circumstances your goals for medical care should be changed from attempting to prolong life to being allowed to die. In some situations, certain treatments may not make sense because they " will not help, but other treatments will be of important benefit.    Consider these three questions:  1. When would it make sense to continue certain treatments in an effort to prolong life and seek recovery?  2. When would it make sense to stop or withhold certain treatments and accept death when it comes?  3. Under any circumstance, what kind of comfort care would you want, including medication, spiritual and environmental options?    Making these choices requires understanding the information, weighing the benefits and burdens from your perspective, and then discussing your choices with those closest to you.    WHAT'S NEXT?  How do you make sure that your choices are respected? First talk, about them with your family, friends, clergy and physician, then put your choices in writing. Information about putting your plans into writing -- in an advance directive -- is available from your healthcare organization or .    Do you have any significant health problems? What health problems do you fear in the future?     Consider what frightens you most about medical treatment. What role does Mu-ism, mario alberto or spirituality play in how you live your life? How does cost influence your decisions about medical care?   In terms of future medical care, under what circumstances would you want the goals of medical treatment to switch from attempting to prolong life to focusing on comfort?     Describe these circumstances in as much detail as possible.   Ask yourself, what will most help me live well at this point in my life?     Share your views with the person or people who would make your medical decisions if you could not make them.     THERE'S NO EASY WAY TO PLAN FOR FUTURE HEALTHCARE CHOICES.  It's a process that involves thinking and talking about complex and sensitive issues.    The questions that follow will help in the advance care planning process. This is a guide for your own benefit; it's not a test, and there  are no right or wrong answers. It does not need to be completed all at once. You may use it to share your feelings with healthcare providers, your family and your friends. The answers to these questions will help those you love make choices for you when you cannot make them yourself.    These are things I need to tell my loved ones:  What is your idea of comfort care? Describe how you would want medications to be used to provide comfort. What type of spiritual care would you want?     I need to learn about: ____________________________  I need to ask my healthcare provider: ________________

## 2021-09-09 ENCOUNTER — TELEPHONE (OUTPATIENT)
Dept: FAMILY MEDICINE CLINIC | Facility: CLINIC | Age: 71
End: 2021-09-09

## 2021-09-09 DIAGNOSIS — R91.8 MULTIPLE LUNG NODULES ON CT: ICD-10-CM

## 2021-09-09 DIAGNOSIS — Z12.31 ENCOUNTER FOR SCREENING MAMMOGRAM FOR MALIGNANT NEOPLASM OF BREAST: Primary | ICD-10-CM

## 2021-09-09 NOTE — TELEPHONE ENCOUNTER
Caller: Meliza Red    Relationship: Self    Best call back number: [phone]    What orders are you requesting (i.e. lab or imaging) [facility]    In what timeframe would the patient need to come in: WHEN ORDER ENTERED    Additional notes: DID NOT FAST ON 9/7 APPOINTMENT AND WAS ASKED TO COME BACK AFTER ORDERS WERE ENTERED.     PLEASE CALL PATIENT WHEN ORDER IS IN

## 2021-09-10 PROBLEM — M85.80 OSTEOPENIA AFTER MENOPAUSE: Status: ACTIVE | Noted: 2021-09-10

## 2021-09-10 PROBLEM — Z78.0 OSTEOPENIA AFTER MENOPAUSE: Status: ACTIVE | Noted: 2021-09-10

## 2021-09-10 PROBLEM — L40.9 PSORIASIS: Status: ACTIVE | Noted: 2021-09-10

## 2021-09-10 NOTE — TELEPHONE ENCOUNTER
Please call patient. Labs have been ordered and can be done at patient's convenience. Patient will need to be fasting prior to having blood drawn.

## 2021-09-23 ENCOUNTER — HOSPITAL ENCOUNTER (OUTPATIENT)
Dept: PAIN MEDICINE | Facility: HOSPITAL | Age: 71
Discharge: HOME | End: 2021-09-23

## 2021-09-23 ENCOUNTER — LAB (OUTPATIENT)
Dept: FAMILY MEDICINE CLINIC | Facility: CLINIC | Age: 71
End: 2021-09-23

## 2021-09-23 VITALS
BODY MASS INDEX: 27.49 KG/M2 | OXYGEN SATURATION: 94 % | SYSTOLIC BLOOD PRESSURE: 158 MMHG | WEIGHT: 165 LBS | TEMPERATURE: 98.4 F | HEART RATE: 78 BPM | HEIGHT: 65 IN | RESPIRATION RATE: 16 BRPM | DIASTOLIC BLOOD PRESSURE: 83 MMHG

## 2021-09-23 DIAGNOSIS — M51.36 DDD (DEGENERATIVE DISC DISEASE), LUMBAR: Primary | ICD-10-CM

## 2021-09-23 DIAGNOSIS — R52 PAIN: ICD-10-CM

## 2021-09-23 LAB
25(OH)D3 SERPL-MCNC: 88.7 NG/ML
ALBUMIN SERPL-MCNC: 4.7 G/DL (ref 3.5–5.2)
ALBUMIN/GLOB SERPL: 1.6 G/DL
ALP SERPL-CCNC: 71 U/L (ref 39–117)
ALT SERPL W P-5'-P-CCNC: 66 U/L (ref 1–33)
ANION GAP SERPL CALCULATED.3IONS-SCNC: 12.9 MMOL/L (ref 5–15)
AST SERPL-CCNC: 68 U/L (ref 1–32)
BASOPHILS # BLD AUTO: 0.04 10*3/MM3 (ref 0–0.2)
BASOPHILS NFR BLD AUTO: 0.6 % (ref 0–1.5)
BILIRUB SERPL-MCNC: 0.2 MG/DL (ref 0–1.2)
BUN SERPL-MCNC: 16 MG/DL (ref 8–23)
BUN/CREAT SERPL: 24.6 (ref 7–25)
CALCIUM SPEC-SCNC: 9.9 MG/DL (ref 8.6–10.5)
CHLORIDE SERPL-SCNC: 98 MMOL/L (ref 98–107)
CHOLEST SERPL-MCNC: 240 MG/DL (ref 0–200)
CO2 SERPL-SCNC: 31.1 MMOL/L (ref 22–29)
CREAT SERPL-MCNC: 0.65 MG/DL (ref 0.57–1)
DEPRECATED RDW RBC AUTO: 42.2 FL (ref 37–54)
EOSINOPHIL # BLD AUTO: 0.07 10*3/MM3 (ref 0–0.4)
EOSINOPHIL NFR BLD AUTO: 1.1 % (ref 0.3–6.2)
ERYTHROCYTE [DISTWIDTH] IN BLOOD BY AUTOMATED COUNT: 11.9 % (ref 12.3–15.4)
GFR SERPL CREATININE-BSD FRML MDRD: 90 ML/MIN/1.73
GLOBULIN UR ELPH-MCNC: 2.9 GM/DL
GLUCOSE SERPL-MCNC: 75 MG/DL (ref 65–99)
HCT VFR BLD AUTO: 38.8 % (ref 34–46.6)
HDLC SERPL-MCNC: 83 MG/DL (ref 40–60)
HGB BLD-MCNC: 13.2 G/DL (ref 12–15.9)
IMM GRANULOCYTES # BLD AUTO: 0.03 10*3/MM3 (ref 0–0.05)
IMM GRANULOCYTES NFR BLD AUTO: 0.5 % (ref 0–0.5)
LDLC SERPL CALC-MCNC: 128 MG/DL (ref 0–100)
LDLC/HDLC SERPL: 1.49 {RATIO}
LYMPHOCYTES # BLD AUTO: 1.57 10*3/MM3 (ref 0.7–3.1)
LYMPHOCYTES NFR BLD AUTO: 23.8 % (ref 19.6–45.3)
MCH RBC QN AUTO: 33.3 PG (ref 26.6–33)
MCHC RBC AUTO-ENTMCNC: 34 G/DL (ref 31.5–35.7)
MCV RBC AUTO: 98 FL (ref 79–97)
MONOCYTES # BLD AUTO: 0.58 10*3/MM3 (ref 0.1–0.9)
MONOCYTES NFR BLD AUTO: 8.8 % (ref 5–12)
NEUTROPHILS NFR BLD AUTO: 4.32 10*3/MM3 (ref 1.7–7)
NEUTROPHILS NFR BLD AUTO: 65.2 % (ref 42.7–76)
NRBC BLD AUTO-RTO: 0 /100 WBC (ref 0–0.2)
PLATELET # BLD AUTO: 283 10*3/MM3 (ref 140–450)
PMV BLD AUTO: 9.2 FL (ref 6–12)
POTASSIUM SERPL-SCNC: 4 MMOL/L (ref 3.5–5.2)
PROT SERPL-MCNC: 7.6 G/DL (ref 6–8.5)
RBC # BLD AUTO: 3.96 10*6/MM3 (ref 3.77–5.28)
SODIUM SERPL-SCNC: 142 MMOL/L (ref 136–145)
TRIGL SERPL-MCNC: 167 MG/DL (ref 0–150)
VIT B12 BLD-MCNC: 510 PG/ML (ref 211–946)
VLDLC SERPL-MCNC: 29 MG/DL (ref 5–40)
WBC # BLD AUTO: 6.61 10*3/MM3 (ref 3.4–10.8)

## 2021-09-23 PROCEDURE — 25010000002 METHYLPREDNISOLONE PER 80 MG: Performed by: STUDENT IN AN ORGANIZED HEALTH CARE EDUCATION/TRAINING PROGRAM

## 2021-09-23 PROCEDURE — 36415 COLL VENOUS BLD VENIPUNCTURE: CPT | Performed by: FAMILY MEDICINE

## 2021-09-23 PROCEDURE — 62323 NJX INTERLAMINAR LMBR/SAC: CPT | Performed by: STUDENT IN AN ORGANIZED HEALTH CARE EDUCATION/TRAINING PROGRAM

## 2021-09-23 PROCEDURE — 77003 FLUOROGUIDE FOR SPINE INJECT: CPT

## 2021-09-23 PROCEDURE — 82306 VITAMIN D 25 HYDROXY: CPT | Performed by: FAMILY MEDICINE

## 2021-09-23 PROCEDURE — 85025 COMPLETE CBC W/AUTO DIFF WBC: CPT | Performed by: FAMILY MEDICINE

## 2021-09-23 PROCEDURE — 82607 VITAMIN B-12: CPT | Performed by: FAMILY MEDICINE

## 2021-09-23 PROCEDURE — 80061 LIPID PANEL: CPT | Performed by: FAMILY MEDICINE

## 2021-09-23 PROCEDURE — 0 IOPAMIDOL 41 % SOLUTION: Performed by: STUDENT IN AN ORGANIZED HEALTH CARE EDUCATION/TRAINING PROGRAM

## 2021-09-23 PROCEDURE — 80053 COMPREHEN METABOLIC PANEL: CPT | Performed by: FAMILY MEDICINE

## 2021-09-23 RX ORDER — METHYLPREDNISOLONE ACETATE 80 MG/ML
80 INJECTION, SUSPENSION INTRA-ARTICULAR; INTRALESIONAL; INTRAMUSCULAR; SOFT TISSUE ONCE
Status: COMPLETED | OUTPATIENT
Start: 2021-09-23 | End: 2021-09-23

## 2021-09-23 RX ADMIN — METHYLPREDNISOLONE ACETATE 80 MG: 80 INJECTION, SUSPENSION INTRA-ARTICULAR; INTRALESIONAL; INTRAMUSCULAR; SOFT TISSUE at 09:30

## 2021-09-23 RX ADMIN — IOPAMIDOL 1 ML: 408 INJECTION, SOLUTION INTRATHECAL at 09:29

## 2021-09-23 NOTE — DISCHARGE INSTRUCTIONS
EPIDURAL STEROID INJECTION          An epidural steroid injection is a shot of steroid medicine and numbing medicine that is given into the space between the spinal cord and the bones of the back (epidural space).  The injection helps relieve pain by an irritated or swollen nerve root.    TELL YOUR HEALTH CARE PROVIDER ABOUT:  • Any allergies you have  • All medicines you are taking including any over the counter medicines  • Any blood disorders you have  • Any surgeries you have had  • Any medical conditions you have  • Whether you are pregnant or may be pregnant    WHAT ARE THE RISK?  Generally, this is a safe procedure. However,problems may occur, including  • Headache  • Bleeding  • Infection  • Allergic Reaction  • Nerve Damage    WHAT CAN I EXPECT AFTER THE PROCEDURE?    INJECTION SITE  • Remove the Band-Aid/s after 24 hours  • Check your injection site every day for signs of infection.  Check for:             Redness             Bleeding (small amt is normal)             Warmth             Pus or bad odor  • Some numbness may be experienced for several hours following the procedure.  • Avoid using heat on the injection site for 24 hours. You may use ice intermittently if needed by placing a         towel between your skin and the ice bag and using the ice for 20 minutes 2-3 times a day.  • Do not take baths, swim or use a hot tub for 24 hours.    ACTIVITY  • No strenuous activity for 24 hours then return to normal activity as tolerated.  • If your leg is numb, no driving until full sensation and strength has returned.    GENERAL INSTRUCTIONS:  • The injection site may feel numb, use ice with caution if numbness is present and no heat for 24 hours or until numbness is gone.   • If you have numbness or weakness in your arm or leg, use those areas with caution until normal sensation returns.  • It is not uncommon to notice an increase in discomfort or a change in the location of discomfort for 3-4 days after  the procedure.  If discomfort is noticed at the injection site, ice may be            applied to that area for 20 min 2-3 times a day.  • Take the pain medicine your physician has prescribed or over the counter pain relievers as long as you do not have any contraindications.  • If you are a diabetic, monitor your blood sugar closely.  The steroids used in your procedure may increase your blood sugar level up to 36 hours after the injection.  If your blood sugar is greater than 250, call the physician that helps you monitor your blood sugar.  • Keep all follow-up visits as scheduled by your health care provider. This is important.    CONTACT OUR OFFICE IF:  • You have any of these signs of infection            -Redness, swelling, or warmth around your injection site.            -Fluid or blood coming from your injection site (small amt of blood is normal)            -Pus or a bad odor from your injection site            -A fever  • You develop a severe headache or a stiff neck  • You lose control of your bladder or bowel movements      [facility] HOURS   • Monday-Friday 7:30 am. - 4:00 pm.  For any problem related to your procedure we can be reached at [phone]  • If you experience an emergency with your procedure, call [phone] or go to the emergency room.

## 2021-09-23 NOTE — PROCEDURES
PREOPERATIVE DIAGNOS  1.         Lumbar DDD     POSTOPERATIVE DIAGNOSIS:  1.  Same     PROCEDURE:  Caudal Epidural Steroid Injection      PROCEDURE NOTE:  After obtaining written informed consent patient was taken to the procedure room. Pre-procedure blood pressure and pulse were stable and recorded in patients clinic chart.      The patient was placed in the prone position on fluoroscopy table.  The lower back was prepped with chloraprep times three and draped in the usual sterile fashion.  The skin over the sacral hiatus was identified under fluoroscopic guidance and infiltrated with 1% lidocaine for local anesthesia via 25 gauge needle.  An 20-g spinal needle was used to access the epidural space under fluoroscopic guidance. 2 cc of the omnipaque dye was injected through the catheter with good spread seen from L5-S2 area.  80 mg of depomedrol  with 4 cc of saline as injected. There was no evidence of CSF, paresthesia or heme. The needle was cleared with preservative free local anesthetic and removed. Skin was cleaned and a sterile dressing was applied.     Following the procedure the patient's vital signs were stable. The patient was discharged home in good condition after being given discharge instructions.     COMPLICATIONS: None     Uche Huizar DO  Pain Management   [facility]

## 2021-09-23 NOTE — H&P
H and P reviewed from previous visit and no changes to patient's clinical presentation. Will proceed with procedure as planned. Patient denies history of DM and being on blood thinners.    Uche Huizar DO  Pain Management   [facility]

## 2021-09-29 DIAGNOSIS — E78.2 MIXED HYPERLIPIDEMIA: ICD-10-CM

## 2021-09-29 RX ORDER — ATORVASTATIN CALCIUM 40 MG/1
40 TABLET, FILM COATED ORAL NIGHTLY
Qty: 90 TABLET | Refills: 1 | Status: SHIPPED | OUTPATIENT
Start: 2021-09-29 | End: 2021-10-11

## 2021-10-08 ENCOUNTER — TELEPHONE (OUTPATIENT)
Dept: FAMILY MEDICINE CLINIC | Facility: CLINIC | Age: 71
End: 2021-10-08

## 2021-10-08 NOTE — TELEPHONE ENCOUNTER
Caller: Meliza Red    Relationship: Self    Best call back number: [phone] (H)    What is the best time to reach you: ANYTIME    Who are you requesting to speak with (clinical staff, provider,  specific staff member): CLINICAL STAFF    Do you know the name of the person who called:     What was the call regarding: PATIENT HAS A QUESTION ABOUT HER atorvastatin (LIPITOR) 40 MG tablet. PATIENT STATES THAT WHEN SHE RECEIVED THE PHONE CALL ADVISING HER ABOUT HER LAB RESULTS SHE WAS TOLD TO INCREASE HER atorvastatin (LIPITOR) 40 MG tablet FROM 10 MG TO 20 MG, BUT WHEN SHE PICKED UP HER PRESCRIPTION IT WAS 40 MG. PATIENT STATES THAT THIS A BIG JUMP FROM THE 20 MG THAT SHE WAS TOLD TO TAKE. PATIENT IS REQUESTING A CALLBACK TO DISCUSS THIS MATTER.     Do you require a callback: YES        THANKS

## 2021-10-10 NOTE — TELEPHONE ENCOUNTER
Please call patient and let her know that I apologize for the mistake.  Which should be agreeable to cutting her atorvastatin tablet in half?  Or would she prefer a new prescription of atorvastatin 20 mg daily?

## 2021-10-11 DIAGNOSIS — E78.2 MIXED HYPERLIPIDEMIA: ICD-10-CM

## 2021-10-11 RX ORDER — ATORVASTATIN CALCIUM 20 MG/1
20 TABLET, FILM COATED ORAL NIGHTLY
Qty: 90 TABLET | Refills: 1 | Status: SHIPPED | OUTPATIENT
Start: 2021-10-11 | End: 2022-04-15

## 2021-10-11 NOTE — TELEPHONE ENCOUNTER
Spoke with patient. She would prefer to have a new script. Can you send to [facility] please? Also, she mentioned she would just let her  use the 40 mg since he takes that anyways. I did advise her she is not suppose to share her prescription medications with others.

## 2021-10-20 NOTE — PROGRESS NOTES
Subjective   Meliza Red is a 71 y.o. female is here for follow-up for lower back pain.  She was last seen on 9/23/2021 for caudal INGE with 100% pain relief. Mild b/l hip pain with excessive walking but overall tolerable.       On last visit:     Lower back pain is 0/10 on VAS, 2/10 at maximum.  Improved by diclofenac, caudal INGE.  Worse with sitting in car for longer period of time.          PHQ-9- 4  SOAPP- 5    Previous Injection:   9/20/2021-caudal INGE- 100% pain relief.   8/7/2021-caudal INGE-80% pain relief for 1 month.     PMH:   Back surgeries x2 (2011) - lumbar disectomy/ decompression L3 -S1; laminectomies L4-5, L5-S1, R hip arthroplasty - 2011,  hypertension, tobacco use, left knee OA, bladder surgery-InterStim, GERD, ulcerative colitis.         Current Medications:   Diclofenac 50 mg twice daily as needed  Citalopram      Hx: referred by Dr. Fermin, Ana Harrington MD..  Her pain started about 2 months ago and has been getting worse since then.     Past Medications:     Past Modalities:  TENS:                                                                          YES                                               Physical Therapy Within The Last 6 Months              no  Psychotherapy                                                            no  Massage Therapy                                                       no     Patient Complains Of:  Uro-Fecal Incontinence          No (chronic urinary continence)   Weight Gain/Loss                   no  Fever/Chills                             no  Weakness                               no        Current Outpatient Medications:   •  amLODIPine (NORVASC) 10 MG tablet, Take 1 tablet by mouth Daily With Dinner., Disp: 90 tablet, Rfl: 1  •  atorvastatin (LIPITOR) 20 MG tablet, Take 1 tablet by mouth Every Night., Disp: 90 tablet, Rfl: 1  •  betamethasone dipropionate 0.05 % cream, Apply  topically to the appropriate area as directed 2 (Two) Times a Day., Disp:  45 g, Rfl: 3  •  Calcium Carbonate (CALCIUM 600 PO), Take  by mouth., Disp: , Rfl:   •  carvedilol (COREG) 25 MG tablet, Take 2 tablets by mouth 2 (Two) Times a Day With Meals., Disp: 360 tablet, Rfl: 1  •  Cholecalciferol (VITAMIN D3) 125 MCG (5000 UT) tablet tablet, Take 1 tablet by mouth., Disp: , Rfl:   •  citalopram (CeleXA) 20 MG tablet, Take 1 tablet by mouth Daily., Disp: 90 tablet, Rfl: 1  •  colestipol (COLESTID) 1 g tablet, , Disp: , Rfl:   •  diclofenac (VOLTAREN) 50 MG EC tablet, Take 1 tablet by mouth 2 (Two) Times a Day As Needed (Moderate Pain). Take with food!, Disp: 180 tablet, Rfl: 0  •  folic acid (FOLVITE) 1 MG tablet, Take 1 mg by mouth Daily., Disp: , Rfl:   •  KRILL OIL PO, Take  by mouth., Disp: , Rfl:   •  lisinopril-hydrochlorothiazide (PRINZIDE,ZESTORETIC) 20-12.5 MG per tablet, Take 2 tablets by mouth Daily With Breakfast., Disp: 180 tablet, Rfl: 1  •  Milk Thistle 175 MG capsule, , Disp: , Rfl:   •  montelukast (SINGULAIR) 10 MG tablet, Take 10 mg by mouth Daily., Disp: , Rfl:   •  multivitamin with minerals (MULTIVITAMIN ADULT PO), Take 1 tablet by mouth Daily., Disp: , Rfl:   •  omeprazole (priLOSEC) 20 MG capsule, Take 1 capsule by mouth Daily., Disp: , Rfl:   •  Selenium 200 MCG capsule, , Disp: , Rfl:   •  sulfaSALAzine (AZULFIDINE) 500 MG tablet, Take 2 tablets by mouth 2 (Two) Times a Day., Disp: , Rfl:   •  Turmeric 500 MG tablet, Take 1 tablet by mouth Daily., Disp: , Rfl:   •  vitamin E 100 UNIT capsule, Take 400 Units by mouth Daily., Disp: , Rfl:     The following portions of the patient's history were reviewed and updated as appropriate: allergies, current medications, past family history, past medical history, past social history, past surgical history, and problem list.      REVIEW OF PERTINENT MEDICAL DATA    Past Medical History:   Diagnosis Date   • Allergic    • GERD (gastroesophageal reflux disease)    • Hepatic hemangioma 10/2017    Seen on MRI of the liver   •  "Hyperlipidemia    • Hypertension    • Leg pain    • Pneumonia    • Ulcerative colitis (HCC) 2018    Followed by GI, Dr. Vanda Sotomayor; treated with sulfasalazine     Past Surgical History:   Procedure Laterality Date   • ANKLE SURGERY Right     broken right ankle    • BACK SURGERY     • BLADDER SURGERY      bladder sling    • BREAST AUGMENTATION Bilateral  & 2017    recurrent capsular contracture   • INTERSTIM PLACEMENT Right 2020    for bladder and bowel control - [employer]   • SKIN BIOPSY  2019    Shave biopsy of frontal scalp; irritated verruca vulgaris   • TOE SURGERY      big toe left foot    • TOTAL HIP ARTHROPLASTY Right    • TUBAL ABDOMINAL LIGATION Bilateral      Family History   Problem Relation Age of Onset   • Hypertension Mother    • Hypertension Father    • Colon cancer Sister 43     Social History     Socioeconomic History   • Marital status:    Tobacco Use   • Smoking status: Former Smoker     Packs/day: 1.00     Years: 30.00     Pack years: 30.00     Types: Cigarettes     Quit date:      Years since quittin.8   • Smokeless tobacco: Never Used   Vaping Use   • Vaping Use: Never used   Substance and Sexual Activity   • Alcohol use: Yes     Comment: bourbon- couple daily    • Drug use: No   • Sexual activity: Defer         Review of Systems   Musculoskeletal: Negative for back pain.         Vitals:    10/21/21 1357   BP: 135/68   Pulse: 81   Resp: 16   SpO2: 93%   Weight: 74.8 kg (165 lb)   Height: 165.1 cm (65\")   PainSc: 0-No pain         Objective   Physical Exam  Musculoskeletal:        Legs:            Imaging Reviewed:  CT lumbar spine-2021-independently reviewed  [id number] central stenosis or facet changes.  L3-L4 -facet arthritis. No stenosis  T6-T6-truaioeul neuroforaminal narrowing. Greater than left due to spurring and facet arthritis. There may be nerve impingement especially in the right side.  A1-D3-pvmxwrgiu neuroforaminal narrowing secondary to " prominent endplate spurring. Mild facet arthritis. No spinal stenosis.      Assessment:    1. DDD (degenerative disc disease), lumbar    2. Lumbar spondylosis    3. History of lumbar laminectomy for spinal cord decompression               Plan:   1. Defer UDS for now.   2. We discussed trying a course of formal physical therapy.  Physical therapy can help strengthen and stretch the muscles around the joints. Continue to be as active as possible.   3. Excellent relief from Cauda INGE. Will repeat as needed.      RTC as needed.      Uche Huizar DO  Pain Management   [facility]               INSPECT REPORT    As part of the patient's treatment plan, I may be prescribing controlled substances. The patient has been made aware of appropriate use of such medications, including potential risk of somnolence, limited ability to drive and/or work safely, and the potential for dependence or overdose. It has also been made clear that these medications are for use by this patient only, without concomitant use of alcohol or other substances unless prescribed.     Patient has completed prescribing agreement detailing terms of continued prescribing of controlled substances, including monitoring INSPECT reports, urine drug screening, and pill counts if necessary. The patient is aware that inappropriate use will results in cessation of prescribing such medications.    INSPECT report has been reviewed and scanned into the patient's chart.

## 2021-10-21 ENCOUNTER — OFFICE VISIT (OUTPATIENT)
Dept: PAIN MEDICINE | Facility: CLINIC | Age: 71
End: 2021-10-21

## 2021-10-21 VITALS
WEIGHT: 165 LBS | DIASTOLIC BLOOD PRESSURE: 68 MMHG | RESPIRATION RATE: 16 BRPM | BODY MASS INDEX: 27.49 KG/M2 | HEART RATE: 81 BPM | OXYGEN SATURATION: 93 % | HEIGHT: 65 IN | SYSTOLIC BLOOD PRESSURE: 135 MMHG

## 2021-10-21 DIAGNOSIS — Z98.890 HISTORY OF LUMBAR LAMINECTOMY FOR SPINAL CORD DECOMPRESSION: ICD-10-CM

## 2021-10-21 DIAGNOSIS — M51.36 DDD (DEGENERATIVE DISC DISEASE), LUMBAR: Primary | ICD-10-CM

## 2021-10-21 DIAGNOSIS — M47.816 LUMBAR SPONDYLOSIS: ICD-10-CM

## 2021-10-21 PROCEDURE — 99213 OFFICE O/P EST LOW 20 MIN: CPT | Performed by: STUDENT IN AN ORGANIZED HEALTH CARE EDUCATION/TRAINING PROGRAM

## 2021-12-17 DIAGNOSIS — I10 ESSENTIAL HYPERTENSION: ICD-10-CM

## 2021-12-21 RX ORDER — CARVEDILOL 25 MG/1
50 TABLET ORAL 2 TIMES DAILY WITH MEALS
Qty: 360 TABLET | Refills: 1 | Status: SHIPPED | OUTPATIENT
Start: 2021-12-21 | End: 2022-06-20

## 2021-12-31 DIAGNOSIS — I10 ESSENTIAL HYPERTENSION: ICD-10-CM

## 2022-01-03 ENCOUNTER — PATIENT MESSAGE (OUTPATIENT)
Dept: FAMILY MEDICINE CLINIC | Facility: CLINIC | Age: 72
End: 2022-01-03

## 2022-01-04 RX ORDER — AMLODIPINE BESYLATE 10 MG/1
10 TABLET ORAL
Qty: 90 TABLET | Refills: 1 | Status: SHIPPED | OUTPATIENT
Start: 2022-01-04 | End: 2022-04-18 | Stop reason: SDUPTHER

## 2022-01-05 NOTE — TELEPHONE ENCOUNTER
From: Meliza Red  To: Ana Fermin MD  Sent: 1/3/2022 3:32 PM EST  Subject: Covid    Is fever always a Covid symptom? I was exposed but only have a headache and dizziness, which I’ve had for the last 3 days. I do feel more fatigued than normal but I think the dizziness is causing that, because I have to sit and lay around. I’ve had all 3 shots.

## 2022-01-14 ENCOUNTER — TRANSCRIBE ORDERS (OUTPATIENT)
Dept: ADMINISTRATIVE | Facility: HOSPITAL | Age: 72
End: 2022-01-14

## 2022-01-14 ENCOUNTER — LAB (OUTPATIENT)
Dept: LAB | Facility: HOSPITAL | Age: 72
End: 2022-01-14

## 2022-01-14 DIAGNOSIS — Z11.52 ENCOUNTER FOR SCREENING FOR SEVERE ACUTE RESPIRATORY SYNDROME CORONAVIRUS 2 (SARS-COV-2) INFECTION: Primary | ICD-10-CM

## 2022-01-14 DIAGNOSIS — Z11.52 ENCOUNTER FOR SCREENING FOR SEVERE ACUTE RESPIRATORY SYNDROME CORONAVIRUS 2 (SARS-COV-2) INFECTION: ICD-10-CM

## 2022-01-14 PROCEDURE — C9803 HOPD COVID-19 SPEC COLLECT: HCPCS

## 2022-01-14 PROCEDURE — U0004 COV-19 TEST NON-CDC HGH THRU: HCPCS

## 2022-01-14 PROCEDURE — U0005 INFEC AGEN DETEC AMPLI PROBE: HCPCS

## 2022-01-15 LAB — SARS-COV-2 ORF1AB RESP QL NAA+PROBE: NOT DETECTED

## 2022-02-14 ENCOUNTER — HOSPITAL ENCOUNTER (OUTPATIENT)
Dept: MAMMOGRAPHY | Facility: HOSPITAL | Age: 72
Discharge: HOME | End: 2022-02-14

## 2022-02-14 ENCOUNTER — HOSPITAL ENCOUNTER (OUTPATIENT)
Dept: CT IMAGING | Facility: HOSPITAL | Age: 72
Discharge: HOME | End: 2022-02-14

## 2022-02-14 DIAGNOSIS — R91.8 MULTIPLE LUNG NODULES ON CT: ICD-10-CM

## 2022-02-14 DIAGNOSIS — Z12.31 ENCOUNTER FOR SCREENING MAMMOGRAM FOR MALIGNANT NEOPLASM OF BREAST: ICD-10-CM

## 2022-02-14 PROCEDURE — 77063 BREAST TOMOSYNTHESIS BI: CPT

## 2022-02-14 PROCEDURE — 77067 SCR MAMMO BI INCL CAD: CPT

## 2022-02-14 PROCEDURE — 71250 CT THORAX DX C-: CPT

## 2022-02-18 DIAGNOSIS — I10 ESSENTIAL HYPERTENSION: ICD-10-CM

## 2022-02-20 RX ORDER — LISINOPRIL AND HYDROCHLOROTHIAZIDE 20; 12.5 MG/1; MG/1
TABLET ORAL
Qty: 180 TABLET | Refills: 1 | Status: SHIPPED | OUTPATIENT
Start: 2022-02-20 | End: 2022-08-24 | Stop reason: SDUPTHER

## 2022-03-16 ENCOUNTER — OFFICE VISIT (OUTPATIENT)
Dept: FAMILY MEDICINE CLINIC | Facility: CLINIC | Age: 72
End: 2022-03-16

## 2022-03-16 VITALS
BODY MASS INDEX: 28.49 KG/M2 | OXYGEN SATURATION: 93 % | HEART RATE: 92 BPM | HEIGHT: 65 IN | WEIGHT: 171 LBS | SYSTOLIC BLOOD PRESSURE: 157 MMHG | TEMPERATURE: 97.5 F | DIASTOLIC BLOOD PRESSURE: 80 MMHG

## 2022-03-16 DIAGNOSIS — E78.2 MIXED HYPERLIPIDEMIA: ICD-10-CM

## 2022-03-16 DIAGNOSIS — I10 ESSENTIAL HYPERTENSION: Primary | ICD-10-CM

## 2022-03-16 PROCEDURE — 99213 OFFICE O/P EST LOW 20 MIN: CPT | Performed by: FAMILY MEDICINE

## 2022-03-16 NOTE — PROGRESS NOTES
Assessment and Plan     Problem List Items Addressed This Visit        Cardiac and Vasculature    Essential hypertension - Primary    Overview     BP not at goal, <140/90.  Encouraged low-Na+ diet & 150 min exercise/week.   Continue amlodipine 10 mg, carvedilol 50 mg twice daily, and lisinopril-HCTZ 40-25 mg daily.   Consider adding hydralazine to patient's regimen if ambulatory BP greater than 140/90.  Patient to monitor ambulatory BP.  Monitoring renal function.  Educational material in AVS.           Mixed hyperlipidemia    Overview     Reviewed lipid panel, LDL goal, & 10-year ASCVD risk score.  Encouraged a diet rich in vegetables & 150 minutes of exercise weekly.  Continue atorvastatin 20 mg.               Return in about 6 months (around 9/16/2022) for Medicare Wellness.        Patient was given instructions and counseling regarding her condition or for health maintenance advice. Please see specific information pulled into the AVS if appropriate.        Meliza is a 72 y.o. being seen today for  Hypertension (F/u . No concerns today ) and Hyperlipidemia   Subjective   History of the Present Illness     Postmenopausal osteopenic  female with a concurrent medical history of hypertension and hyperlipidemia presents for follow-up.    Hypertensive in office despite amlodipine 10 mg, carvedilol 50 mg twice daily, and lisinopril-HCTZ 40-25 mg daily. Does not monitor ambulatory BP.    Underwent [facility] screening that revealed normal blood flow in carotid arteries and no evidence of aortic aneurysm. MARLA was normal.    Experiencing double vision and dizziness when rotating neck quickly since undergoing cataract surgery.    Social History  She  reports that she quit smoking about 9 years ago. Her smoking use included cigarettes. She has a 30.00 pack-year smoking history. She has never used smokeless tobacco. She reports current alcohol use. She reports that she does not use drugs.  Objective   Vital Signs        "   Vitals:    03/16/22 1404   BP: 157/80   BP Location: Left arm   Patient Position: Sitting   Cuff Size: Small Adult   Pulse: 92   Temp: 97.5 °F (36.4 °C)   TempSrc: Infrared   SpO2: 93%   Weight: 77.6 kg (171 lb)   Height: 165.1 cm (65\")     BP Readings from Last 1 Encounters:   03/16/22 157/80     Wt Readings from Last 3 Encounters:   03/16/22 77.6 kg (171 lb)   10/21/21 74.8 kg (165 lb)   09/23/21 74.8 kg (165 lb)   Body mass index is 28.46 kg/m².     Physical Exam  Vitals reviewed.   Constitutional:       General: She is not in acute distress.     Appearance: Normal appearance.   HENT:      Head: Normocephalic and atraumatic.   Cardiovascular:      Rate and Rhythm: Normal rate.      Heart sounds: Normal heart sounds.   Pulmonary:      Effort: Pulmonary effort is normal.      Breath sounds: Normal breath sounds.   Neurological:      Mental Status: She is alert and oriented to person, place, and time.   Psychiatric:         Mood and Affect: Mood normal.         Behavior: Behavior normal.               Lipid Panel (09/23/2021 10:09)  Comprehensive Metabolic Panel (09/23/2021 10:09)  CBC Auto Differential (09/23/2021 10:09)    "

## 2022-04-15 DIAGNOSIS — E78.2 MIXED HYPERLIPIDEMIA: ICD-10-CM

## 2022-04-15 RX ORDER — ATORVASTATIN CALCIUM 20 MG/1
TABLET, FILM COATED ORAL
Qty: 90 TABLET | Refills: 1 | Status: SHIPPED | OUTPATIENT
Start: 2022-04-15 | End: 2022-10-14

## 2022-04-18 ENCOUNTER — CLINICAL SUPPORT (OUTPATIENT)
Dept: FAMILY MEDICINE CLINIC | Facility: CLINIC | Age: 72
End: 2022-04-18

## 2022-04-18 VITALS — SYSTOLIC BLOOD PRESSURE: 129 MMHG | HEART RATE: 71 BPM | DIASTOLIC BLOOD PRESSURE: 75 MMHG | OXYGEN SATURATION: 95 %

## 2022-04-18 DIAGNOSIS — I10 ESSENTIAL HYPERTENSION: ICD-10-CM

## 2022-04-18 PROBLEM — H53.2 DIPLOPIA: Status: ACTIVE | Noted: 2022-01-28

## 2022-04-18 PROBLEM — H25.819 COMBINED FORM OF SENILE CATARACT: Status: ACTIVE | Noted: 2022-01-28

## 2022-04-18 RX ORDER — AMLODIPINE BESYLATE 10 MG/1
10 TABLET ORAL
Qty: 90 TABLET | Refills: 1 | Status: SHIPPED | OUTPATIENT
Start: 2022-04-18 | End: 2023-01-09 | Stop reason: SDUPTHER

## 2022-04-18 NOTE — PROGRESS NOTES
Pt is here today for a bp check.   At last visit with Dr. Fermin pt was advised:  Continue amlodipine 10 mg, carvedilol 50 mg twice daily, and lisinopril-HCTZ 40-25 mg daily.   Consider adding hydralazine to patient's regimen if ambulatory BP greater than 140/90.  Pt brought in bp readings to visit today.     Per Dr. Fermin continue medications. See eye surgeon about dizziness. If it continues be seen. Continue to monitor bp at home and f/u in 6 months.   Pt advised in office.   Pt needs refill of amlodipine 10mg sent to [facility] pharmacy.

## 2022-06-18 DIAGNOSIS — I10 ESSENTIAL HYPERTENSION: ICD-10-CM

## 2022-06-20 ENCOUNTER — OFFICE (AMBULATORY)
Dept: URBAN - METROPOLITAN AREA CLINIC 64 | Facility: CLINIC | Age: 72
End: 2022-06-20
Payer: COMMERCIAL

## 2022-06-20 VITALS
HEIGHT: 65 IN | WEIGHT: 169 LBS | HEART RATE: 71 BPM | SYSTOLIC BLOOD PRESSURE: 133 MMHG | DIASTOLIC BLOOD PRESSURE: 81 MMHG

## 2022-06-20 DIAGNOSIS — K21.9 GASTRO-ESOPHAGEAL REFLUX DISEASE WITHOUT ESOPHAGITIS: ICD-10-CM

## 2022-06-20 PROCEDURE — 99213 OFFICE O/P EST LOW 20 MIN: CPT | Performed by: INTERNAL MEDICINE

## 2022-06-20 RX ORDER — OMEPRAZOLE 20 MG/1
CAPSULE, DELAYED RELEASE ORAL
Qty: 90 | Refills: 3 | Status: COMPLETED
End: 2024-01-09

## 2022-06-20 RX ORDER — FOLIC ACID 1 MG/1
1 TABLET ORAL
Qty: 90 | Refills: 3 | Status: ACTIVE
Start: 2020-02-28

## 2022-06-20 RX ORDER — COLESTIPOL HYDROCHLORIDE 1 G/1
2 TABLET, FILM COATED ORAL
Qty: 180 | Refills: 3 | Status: ACTIVE
Start: 2021-02-05

## 2022-06-20 RX ORDER — CARVEDILOL 25 MG/1
50 TABLET ORAL 2 TIMES DAILY WITH MEALS
Qty: 360 TABLET | Refills: 1 | Status: SHIPPED | OUTPATIENT
Start: 2022-06-20 | End: 2022-12-21 | Stop reason: SDUPTHER

## 2022-06-20 RX ORDER — SULFASALAZINE 500 MG/1
2000 TABLET ORAL
Qty: 360 | Refills: 3 | Status: ACTIVE
Start: 2018-10-31

## 2022-06-21 ENCOUNTER — OFFICE VISIT (OUTPATIENT)
Dept: FAMILY MEDICINE CLINIC | Facility: CLINIC | Age: 72
End: 2022-06-21

## 2022-06-21 VITALS
SYSTOLIC BLOOD PRESSURE: 174 MMHG | HEIGHT: 65 IN | WEIGHT: 168.4 LBS | DIASTOLIC BLOOD PRESSURE: 83 MMHG | OXYGEN SATURATION: 94 % | HEART RATE: 74 BPM | BODY MASS INDEX: 28.06 KG/M2 | TEMPERATURE: 98.4 F

## 2022-06-21 DIAGNOSIS — R42 VERTIGO: ICD-10-CM

## 2022-06-21 DIAGNOSIS — I10 ESSENTIAL HYPERTENSION: Primary | ICD-10-CM

## 2022-06-21 PROCEDURE — 99214 OFFICE O/P EST MOD 30 MIN: CPT | Performed by: FAMILY MEDICINE

## 2022-06-21 RX ORDER — MECLIZINE HCL 12.5 MG/1
12.5 TABLET ORAL 3 TIMES DAILY PRN
Qty: 30 TABLET | Refills: 1 | Status: SHIPPED | OUTPATIENT
Start: 2022-06-21 | End: 2022-12-12

## 2022-06-21 NOTE — PROGRESS NOTES
Assessment and Plan     Problem List Items Addressed This Visit        Cardiac and Vasculature    Essential hypertension - Primary    Overview     BP not at goal, <140/90.  Encouraged low-Na+ diet & 150 min exercise/week.   Continue amlodipine 10 mg, carvedilol 50 mg twice daily, and lisinopril-HCTZ 40-25 mg daily.   Consider adding hydralazine to patient's regimen if ambulatory BP greater than 140/90.  Patient to monitor ambulatory BP.  Monitoring renal function.  Educational material in AVS.             Other Visit Diagnoses     Vertigo        Discussed concern for BPPV.  Will refer to ENT for evaluation recommendation.  Consider PT.  Will try meclizine.    Relevant Medications    meclizine (ANTIVERT) 12.5 MG tablet    Other Relevant Orders    Ambulatory Referral to ENT (Otolaryngology) (Completed)        Return if symptoms worsen or fail to improve, for keep appointment in September 2022.        Patient was given instructions and counseling regarding her condition or for health maintenance advice. Please see specific information pulled into the AVS if appropriate.        Meliza is a 72 y.o. being seen today for  Dizziness (Onset: 6 months)   Subjective   History of the Present Illness     Post menopausal white female with CMHx of HTN presents with complaints of recurrent dizziness.    Symptoms presented as double vision the day before her birthday 2020. Was seen by her ophthalmologist and underwent MRI and CT. Reports that imaging studies were unremarkable. Underwent cataract surgery in February 2022 and reported improvement of double vision. Patient reports that she later noticed she was off balance since her cataract surgery. Scheduled to follow up with ophthalmology. Orientation exacerbated by rotating head and bending over. Denies any ringing in the ears.    Hypertensive in office. Reports compliance with antihypertensive therapy.     Social History  She  reports that she quit smoking about 9 years ago. Her  "smoking use included cigarettes. She has a 30.00 pack-year smoking history. She has never used smokeless tobacco. She reports current alcohol use. She reports that she does not use drugs.  Objective   Vital Signs          Vitals:    06/21/22 1105   BP: 174/83   BP Location: Left arm   Patient Position: Sitting   Cuff Size: Adult   Pulse: 74   Temp: 98.4 °F (36.9 °C)   TempSrc: Oral   SpO2: 95%   Weight: 76.4 kg (168 lb 6.4 oz)   Height: 165.1 cm (65\")     Vitals:    06/21/22 1105 06/21/22 1157 06/21/22 1202 06/21/22 1203   Orthostatic BP:  171/91 171/90 170/93   Orthostatic Pulse:  73 80 86   Patient Position: Sitting Lying Sitting Standing     BP Readings from Last 1 Encounters:   06/21/22 174/83     Wt Readings from Last 3 Encounters:   06/21/22 76.4 kg (168 lb 6.4 oz)   03/16/22 77.6 kg (171 lb)   10/21/21 74.8 kg (165 lb)   Body mass index is 28.02 kg/m².     Physical Exam  Vitals reviewed.   Constitutional:       General: She is not in acute distress.     Appearance: Normal appearance.   HENT:      Head: Normocephalic and atraumatic.      Right Ear: Tympanic membrane and ear canal normal.      Left Ear: Tympanic membrane and ear canal normal.   Eyes:      Extraocular Movements: Extraocular movements intact.      Right eye: Nystagmus present.      Left eye: Nystagmus present.      Pupils: Pupils are equal, round, and reactive to light.      Comments: No nystagmus appreciated on [place] Hallpike maneuver.  Symptoms elicited on Annie Hallpike maneuver.  Nystagmus with strained EOM.   Cardiovascular:      Rate and Rhythm: Normal rate.      Heart sounds: Normal heart sounds.   Pulmonary:      Effort: Pulmonary effort is normal.      Breath sounds: Normal breath sounds.   Neurological:      Mental Status: She is alert and oriented to person, place, and time.   Psychiatric:         Mood and Affect: Mood normal.         Behavior: Behavior normal.               Vitamin B12 (09/23/2021 10:09)  Lipid Panel (09/23/2021 " 10:09)  Comprehensive Metabolic Panel (09/23/2021 10:09)  CBC Auto Differential (09/23/2021 10:09)

## 2022-08-03 ENCOUNTER — TELEPHONE (OUTPATIENT)
Dept: FAMILY MEDICINE CLINIC | Facility: CLINIC | Age: 72
End: 2022-08-03

## 2022-08-03 DIAGNOSIS — R42 VERTIGO: Primary | ICD-10-CM

## 2022-08-03 NOTE — TELEPHONE ENCOUNTER
Caller: Meliza Red    Relationship: Self    Best call back number: [phone]      What is the medical concern/diagnosis: DIZZYNESS AND VERTIGO    What specialty or service is being requested: NUEROLOGY    What is the provider, practice or medical service name: [facility]     What is the office location: [facility]    What is the office phone number: FAX NUMBER IS  [phone]    Any additional details:  PATIENT WENT TO ENT AND THEY DID THE V N G TEST YESTERDAY AND THEY RECOMMEND THAT SHE GO TO NEUROLOGIST AS EVERYTHING LOOKED GOOD ON THAT TEST.

## 2022-08-24 DIAGNOSIS — I10 ESSENTIAL HYPERTENSION: ICD-10-CM

## 2022-08-24 RX ORDER — LISINOPRIL AND HYDROCHLOROTHIAZIDE 20; 12.5 MG/1; MG/1
2 TABLET ORAL
Qty: 180 TABLET | Refills: 1 | Status: SHIPPED | OUTPATIENT
Start: 2022-08-24 | End: 2023-02-17 | Stop reason: SDUPTHER

## 2022-08-24 NOTE — TELEPHONE ENCOUNTER
Caller: Meliza Red    Relationship: Self    Best call back number: [phone]    Requested Prescriptions:   Requested Prescriptions     Pending Prescriptions Disp Refills   • lisinopril-hydrochlorothiazide (PRINZIDE,ZESTORETIC) 20-12.5 MG per tablet 180 tablet 1     Sig: Take 2 tablets by mouth Daily With Breakfast.        Pharmacy where request should be sent: [facility] [address] [place] [address], [place]  [address] [facility] - [phone] [facility] [phone]      Additional details provided by patient: 3 DAY SUPPLY    Does the patient have less than a 3 day supply:  [x] Yes  [] No    Zoe WEAVER Rep   08/24/22 13:38 EDT

## 2022-08-30 NOTE — TELEPHONE ENCOUNTER
BP Readings from Last 3 Encounters:   01/11/21 149/84   07/10/20 147/78   01/10/20 158/82     Lab Results   Component Value Date    CREATININE 0.73 11/24/2020     Lab Results   Component Value Date    K 3.5 11/24/2020        Detail Level: Detailed

## 2022-09-19 ENCOUNTER — OFFICE VISIT (OUTPATIENT)
Dept: FAMILY MEDICINE CLINIC | Facility: CLINIC | Age: 72
End: 2022-09-19

## 2022-09-19 VITALS
HEART RATE: 68 BPM | OXYGEN SATURATION: 95 % | TEMPERATURE: 97.8 F | SYSTOLIC BLOOD PRESSURE: 128 MMHG | BODY MASS INDEX: 27.96 KG/M2 | WEIGHT: 167.8 LBS | HEIGHT: 65 IN | DIASTOLIC BLOOD PRESSURE: 78 MMHG

## 2022-09-19 DIAGNOSIS — M85.80 OSTEOPENIA AFTER MENOPAUSE: ICD-10-CM

## 2022-09-19 DIAGNOSIS — K51.80 OTHER ULCERATIVE COLITIS WITHOUT COMPLICATION: ICD-10-CM

## 2022-09-19 DIAGNOSIS — Z78.0 OSTEOPENIA AFTER MENOPAUSE: ICD-10-CM

## 2022-09-19 DIAGNOSIS — Z23 NEED FOR VACCINATION: ICD-10-CM

## 2022-09-19 DIAGNOSIS — Z00.00 ENCOUNTER FOR MEDICARE ANNUAL WELLNESS EXAM: ICD-10-CM

## 2022-09-19 DIAGNOSIS — E78.2 MIXED HYPERLIPIDEMIA: ICD-10-CM

## 2022-09-19 DIAGNOSIS — I10 ESSENTIAL HYPERTENSION: Primary | ICD-10-CM

## 2022-09-19 DIAGNOSIS — K76.0 HEPATIC STEATOSIS: ICD-10-CM

## 2022-09-19 PROBLEM — G25.81 RESTLESS LEGS: Status: ACTIVE | Noted: 2022-08-10

## 2022-09-19 PROBLEM — I83.90 VARICOSE VEINS OF LOWER EXTREMITY: Status: ACTIVE | Noted: 2022-08-10

## 2022-09-19 PROBLEM — I83.813 PAIN DUE TO VARICOSE VEINS OF BOTH LOWER EXTREMITIES: Status: ACTIVE | Noted: 2022-08-10

## 2022-09-19 PROBLEM — M25.473 ANKLE EDEMA: Status: ACTIVE | Noted: 2022-08-10

## 2022-09-19 PROBLEM — I78.1 SPIDER NEVUS: Status: ACTIVE | Noted: 2022-08-10

## 2022-09-19 PROCEDURE — 1170F FXNL STATUS ASSESSED: CPT | Performed by: FAMILY MEDICINE

## 2022-09-19 PROCEDURE — G0008 ADMIN INFLUENZA VIRUS VAC: HCPCS | Performed by: FAMILY MEDICINE

## 2022-09-19 PROCEDURE — 90662 IIV NO PRSV INCREASED AG IM: CPT | Performed by: FAMILY MEDICINE

## 2022-09-19 PROCEDURE — 1159F MED LIST DOCD IN RCRD: CPT | Performed by: FAMILY MEDICINE

## 2022-09-19 PROCEDURE — G0439 PPPS, SUBSEQ VISIT: HCPCS | Performed by: FAMILY MEDICINE

## 2022-09-19 PROCEDURE — 1126F AMNT PAIN NOTED NONE PRSNT: CPT | Performed by: FAMILY MEDICINE

## 2022-09-19 RX ORDER — CITALOPRAM 20 MG/1
TABLET ORAL
COMMUNITY
End: 2022-12-12

## 2022-09-19 NOTE — PROGRESS NOTES
The ABCs of the Annual Wellness Visit  Subsequent Medicare Wellness Visit    Chief Complaint   Patient presents with   • Medicare Wellness-subsequent      Subjective    History of Present Illness:  Meliza Red is a 72 y.o. female who presents for a Subsequent Medicare Wellness Visit.    The following portions of the patient's history were reviewed and   updated as appropriate: allergies, current medications, past family history, past medical history, past social history, past surgical history and problem list.    Compared to one year ago, the patient feels her physical   health is better.    Compared to one year ago, the patient feels her mental   health is the same.    Recent Hospitalizations:  She was not admitted to the hospital during the last year.       Current Medical Providers:  Patient Care Team:  Ana Fermin MD as PCP - General (Family Medicine)    Outpatient Medications Prior to Visit   Medication Sig Dispense Refill   • amLODIPine (NORVASC) 10 MG tablet Take 1 tablet by mouth Daily With Dinner. 90 tablet 1   • atorvastatin (LIPITOR) 20 MG tablet TAKE ONE TABLET BY MOUTH ONCE NIGHTLY 90 tablet 1   • Calcium Carbonate (CALCIUM 600 PO) Take  by mouth.     • carvedilol (COREG) 25 MG tablet Take 2 tablets by mouth 2 (Two) Times a Day With Meals. 360 tablet 1   • Cholecalciferol (VITAMIN D3) 125 MCG (5000 UT) tablet tablet Take 1 tablet by mouth.     • citalopram (CeleXA) 20 MG tablet citalopram 20 mg tablet     • colestipol (COLESTID) 1 g tablet      • diclofenac (VOLTAREN) 50 MG EC tablet diclofenac sodium 50 mg tablet,delayed release     • folic acid (FOLVITE) 1 MG tablet Take 1 mg by mouth Daily.     • KRILL OIL PO Take  by mouth.     • lisinopril-hydrochlorothiazide (PRINZIDE,ZESTORETIC) 20-12.5 MG per tablet Take 2 tablets by mouth Daily With Breakfast. 180 tablet 1   • meclizine (ANTIVERT) 12.5 MG tablet Take 1 tablet by mouth 3 (Three) Times a Day As Needed for Dizziness. 30 tablet 1   •  "Milk Thistle 175 MG capsule      • multivitamin with minerals tablet tablet Take 1 tablet by mouth Daily.     • omeprazole (priLOSEC) 20 MG capsule Take 1 capsule by mouth Daily.     • Selenium 200 MCG capsule      • sulfaSALAzine (AZULFIDINE) 500 MG tablet Take 2 tablets by mouth 2 (Two) Times a Day.     • Turmeric 500 MG tablet Take 1 tablet by mouth Daily.     • vitamin E 100 UNIT capsule Take 400 Units by mouth Daily.       No facility-administered medications prior to visit.       No opioid medication identified on active medication list. I have reviewed chart for other potential  high risk medication/s and harmful drug interactions in the elderly.          Aspirin is not on active medication list.  Aspirin use is not indicated based on review of current medical condition/s. Risk of harm outweighs potential benefits.  .    Patient Active Problem List   Diagnosis   • Chronic back pain   • Degeneration of intervertebral disc of lumbosacral region   • Hiatal hernia with GERD   • Essential hypertension   • Insomnia   • Mixed hyperlipidemia   • Silicone leakage from breast implant   • Status post lumbar spine operation   • BEAR on CPAP   • Hepatic steatosis   • Multiple lung nodules on CT   • Ulcerative colitis (HCC)   • Diverticulosis of colon   • Psoriasis   • Osteopenia after menopause   • Diplopia   • Combined form of senile cataract   • Varicose veins of lower extremity   • Spider nevus   • Restless legs   • Pain due to varicose veins of both lower extremities   • Ankle edema     Advance Care Planning  Advance Directive is on file.  ACP discussion was held with the patient during this visit. Copy requested.          Objective    Vitals:    09/19/22 1402   BP: 128/78   BP Location: Left arm   Patient Position: Sitting   Cuff Size: Adult   Pulse: 68   Temp: 97.8 °F (36.6 °C)   TempSrc: Oral   SpO2: 95%   Weight: 76.1 kg (167 lb 12.8 oz)   Height: 165.1 cm (65\")   PainSc: 0-No pain     Estimated body mass index is " "27.92 kg/m² as calculated from the following:    Height as of this encounter: 165.1 cm (65\").    Weight as of this encounter: 76.1 kg (167 lb 12.8 oz).    BMI is >= 25 and <30. (Overweight) The following options were offered after discussion;: exercise counseling/recommendations and nutrition counseling/recommendations      Does the patient have evidence of cognitive impairment? No    Physical Exam  Vitals reviewed.   Constitutional:       General: She is not in acute distress.     Appearance: Normal appearance.   HENT:      Head: Normocephalic and atraumatic.   Cardiovascular:      Rate and Rhythm: Normal rate.      Heart sounds: Normal heart sounds.   Pulmonary:      Effort: Pulmonary effort is normal.      Breath sounds: Normal breath sounds.   Neurological:      Mental Status: She is alert and oriented to person, place, and time.   Psychiatric:         Mood and Affect: Mood normal.         Behavior: Behavior normal.                 HEALTH RISK ASSESSMENT    Smoking Status:  Social History     Tobacco Use   Smoking Status Former Smoker   • Packs/day: 1.00   • Years: 30.00   • Pack years: 30.00   • Types: Cigarettes   • Quit date:    • Years since quittin.7   Smokeless Tobacco Never Used     Alcohol Consumption:  Social History     Substance and Sexual Activity   Alcohol Use Yes    Comment: bourbon- couple daily      Fall Risk Screen:    STEADI Fall Risk Assessment was completed, and patient is at LOW risk for falls.Assessment completed on:2022    Depression Screening:  PHQ-2/PHQ-9 Depression Screening 2022   Retired PHQ-9 Total Score -   Retired Total Score -   Little Interest or Pleasure in Doing Things 0-->not at all   Feeling Down, Depressed or Hopeless 0-->not at all   PHQ-9: Brief Depression Severity Measure Score 0       Health Habits and Functional and Cognitive Screening:  Functional & Cognitive Status 2022   Do you have difficulty preparing food and eating? No   Do you have " difficulty bathing yourself, getting dressed or grooming yourself? No   Do you have difficulty using the toilet? No   Do you have difficulty moving around from place to place? No   Do you have trouble with steps or getting out of a bed or a chair? Yes   Current Diet Well Balanced Diet   Dental Exam Up to date   Eye Exam Up to date   Exercise (times per week) 2 times per week   Current Exercises Include Stationary Bicycling/Spin Class   Current Exercise Activities Include -   Do you need help using the phone?  No   Are you deaf or do you have serious difficulty hearing?  No   Do you need help with transportation? No   Do you need help shopping? No   Do you need help preparing meals?  No   Do you need help with housework?  No   Do you need help with laundry? No   Do you need help taking your medications? No   Do you need help managing money? No   Do you ever drive or ride in a car without wearing a seat belt? No   Have you felt unusual stress, anger or loneliness in the last month? No   Who do you live with? Spouse   If you need help, do you have trouble finding someone available to you? No   Have you been bothered in the last four weeks by sexual problems? No   Do you have difficulty concentrating, remembering or making decisions? Yes       Age-appropriate Screening Schedule:  Refer to the list below for future screening recommendations based on patient's age, sex and/or medical conditions. Orders for these recommended tests are listed in the plan section. The patient has been provided with a written plan.    Health Maintenance   Topic Date Due   • ZOSTER VACCINE (1 of 2) Never done   • LIPID PANEL  09/23/2022   • INFLUENZA VACCINE  10/01/2022   • DXA SCAN  02/12/2023   • MAMMOGRAM  02/14/2023   • TDAP/TD VACCINES (2 - Td or Tdap) 08/05/2023              Assessment & Plan   CMS Preventative Services Quick Reference  Risk Factors Identified During Encounter  Cardiovascular Disease  Dementia/Memory    Depression/Dysphoria  Fall Risk-High or Moderate  Immunizations Discussed/Encouraged (specific Immunizations; Shingrix and COVID19  Inactivity/Sedentary  Obesity/Overweight   The above risks/problems have been discussed with the patient.  Follow up actions/plans if indicated are seen below in the Assessment/Plan Section.  Pertinent information has been shared with the patient in the After Visit Summary.    Problem List Items Addressed This Visit        Cardiac and Vasculature    Essential hypertension - Primary    Overview     BP at goal, <140/90.  Encouraged low-Na+ diet & 150 min exercise/week.   Continue amlodipine 10 mg, carvedilol 50 mg twice daily, and lisinopril-HCTZ 40-25 mg daily.   Patient to monitor ambulatory BP.  Monitoring renal function.  Educational material in AVS.         Mixed hyperlipidemia    Overview     Reviewed lipid panel, LDL goal, & 10-year ASCVD risk score.  Encouraged a diet rich in vegetables & 150 minutes of exercise weekly.  Continue atorvastatin 20 mg.            Gastrointestinal Abdominal     Hepatic steatosis    Overview     Encouraged diet rich in vegetables and 150 minutes exercise weekly.  Monitoring LFTs.  Followed by GI.         Ulcerative colitis (HCC)    Overview     Continue sulfasalazine 1000 mg twice daily and colestipol 1 g daily..  Monitoring for blood dyscrasia and organ dysfunction.  Followed by GI.         Relevant Medications    diclofenac (VOLTAREN) 50 MG EC tablet       Musculoskeletal and Injuries    Osteopenia after menopause    Overview     Encouraged OTC calcium 1200 mg & vitamin D 800 IU daily.  Last DEXA scan was in February 2021. Repeat DEXA scan every 2 years.           Other Visit Diagnoses     Need for vaccination        Relevant Orders    Fluzone High-Dose 65+yrs (6655-8375) (Completed)    Encounter for Medicare annual wellness exam                Follow Up:   Return in about 6 months (around 3/19/2023) for Recheck BP & HLD.     An After Visit Summary  and PPPS were made available to the patient.                 Vitamin D 25 Hydroxy (09/23/2021 10:09)  Vitamin B12 (09/23/2021 10:09)  Lipid Panel (09/23/2021 10:09)  Comprehensive Metabolic Panel (09/23/2021 10:09)  CBC Auto Differential (09/23/2021 10:09)

## 2022-10-14 DIAGNOSIS — E78.2 MIXED HYPERLIPIDEMIA: ICD-10-CM

## 2022-10-14 RX ORDER — ATORVASTATIN CALCIUM 20 MG/1
TABLET, FILM COATED ORAL
Qty: 90 TABLET | Refills: 0 | Status: SHIPPED | OUTPATIENT
Start: 2022-10-14 | End: 2023-01-18 | Stop reason: SDUPTHER

## 2022-11-01 ENCOUNTER — OFFICE (AMBULATORY)
Dept: URBAN - METROPOLITAN AREA CLINIC 64 | Facility: CLINIC | Age: 72
End: 2022-11-01

## 2022-11-01 VITALS
HEIGHT: 65 IN | SYSTOLIC BLOOD PRESSURE: 139 MMHG | WEIGHT: 171 LBS | DIASTOLIC BLOOD PRESSURE: 77 MMHG | HEART RATE: 77 BPM

## 2022-11-01 DIAGNOSIS — E83.00 DISORDER OF COPPER METABOLISM, UNSPECIFIED: ICD-10-CM

## 2022-11-01 DIAGNOSIS — K21.9 GASTRO-ESOPHAGEAL REFLUX DISEASE WITHOUT ESOPHAGITIS: ICD-10-CM

## 2022-11-01 DIAGNOSIS — R74.8 ABNORMAL LEVELS OF OTHER SERUM ENZYMES: ICD-10-CM

## 2022-11-01 DIAGNOSIS — R79.89 OTHER SPECIFIED ABNORMAL FINDINGS OF BLOOD CHEMISTRY: ICD-10-CM

## 2022-11-01 PROCEDURE — 99214 OFFICE O/P EST MOD 30 MIN: CPT | Performed by: INTERNAL MEDICINE

## 2022-12-12 ENCOUNTER — OFFICE VISIT (OUTPATIENT)
Dept: NEUROLOGY | Facility: CLINIC | Age: 72
End: 2022-12-12

## 2022-12-12 VITALS — TEMPERATURE: 96.6 F | HEIGHT: 65 IN | WEIGHT: 172 LBS | BODY MASS INDEX: 28.66 KG/M2

## 2022-12-12 DIAGNOSIS — R26.89 LOSS OF BALANCE: Primary | ICD-10-CM

## 2022-12-12 DIAGNOSIS — F41.9 ANXIETY: ICD-10-CM

## 2022-12-12 PROCEDURE — 99204 OFFICE O/P NEW MOD 45 MIN: CPT | Performed by: PSYCHIATRY & NEUROLOGY

## 2022-12-12 RX ORDER — ESCITALOPRAM OXALATE 10 MG/1
10 TABLET ORAL DAILY
Qty: 30 TABLET | Refills: 5 | Status: SHIPPED | OUTPATIENT
Start: 2022-12-12 | End: 2023-04-05 | Stop reason: SDUPTHER

## 2022-12-12 NOTE — PROGRESS NOTES
Chief Complaint  NEW PATIENT (VERTIGO)    Subjective            Meliza Red presents to [facility] for Dizziness  History of Present Illness  New patient referred by Dr. Fermin for vertigo patient states issue started 2022, she states she had cataract surgery in february which helped her not see double vision,     she states vertigo has  been about the same but walking on uneven ground makes it worse and throws off her depth     she states she has seen and ENT specialist to check for crystal in her ears.    PT saw double, better with cataract surgery. But left lens is not right, which effects her balance    Sitting or lying down  Or change in position does not effect dizziness.   Last winter had spinning when changed position, resolve with holding still.     Currently doing better.  Walking on rough surface still a problem  No spinning sensation in a long time  No light headed sensation with standing  Has feeling of being out of balance    ENG testing was normal    No history of headache    Pt has some numbness and tingling in feet      Family History   Problem Relation Age of Onset   • Hypertension Mother    • Hypertension Father    • Colon cancer Sister 43       Past Medical History:   Diagnosis Date   • Allergic    • GERD (gastroesophageal reflux disease)    • Hepatic hemangioma 10/2017    Seen on MRI of the liver   • Hyperlipidemia    • Hypertension    • Leg pain    • Pneumonia    • Ulcerative colitis (HCC) 2018    Followed by GI, Dr. Vanda Sotomayor; treated with sulfasalazine       Social History     Socioeconomic History   • Marital status:    Tobacco Use   • Smoking status: Former     Packs/day: 1.00     Years: 30.00     Pack years: 30.00     Types: Cigarettes     Quit date:      Years since quittin.9   • Smokeless tobacco: Never   Vaping Use   • Vaping Use: Never used   Substance and Sexual Activity   • Alcohol use: Yes     Comment: bourbon- couple daily   "  • Drug use: No   • Sexual activity: Defer         Current Outpatient Medications:   •  amLODIPine (NORVASC) 10 MG tablet, Take 1 tablet by mouth Daily With Dinner., Disp: 90 tablet, Rfl: 1  •  atorvastatin (LIPITOR) 20 MG tablet, TAKE ONE TABLET BY MOUTH ONCE NIGHTLY, Disp: 90 tablet, Rfl: 0  •  Calcium Carbonate (CALCIUM 600 PO), Take  by mouth., Disp: , Rfl:   •  carvedilol (COREG) 25 MG tablet, Take 2 tablets by mouth 2 (Two) Times a Day With Meals., Disp: 360 tablet, Rfl: 1  •  Cholecalciferol (VITAMIN D3) 125 MCG (5000 UT) tablet tablet, Take 1 tablet by mouth., Disp: , Rfl:   •  colestipol (COLESTID) 1 g tablet, , Disp: , Rfl:   •  folic acid (FOLVITE) 1 MG tablet, Take 1 mg by mouth Daily., Disp: , Rfl:   •  KRILL OIL PO, Take  by mouth., Disp: , Rfl:   •  lisinopril-hydrochlorothiazide (PRINZIDE,ZESTORETIC) 20-12.5 MG per tablet, Take 2 tablets by mouth Daily With Breakfast., Disp: 180 tablet, Rfl: 1  •  Milk Thistle 175 MG capsule, , Disp: , Rfl:   •  multivitamin with minerals tablet tablet, Take 1 tablet by mouth Daily., Disp: , Rfl:   •  omeprazole (priLOSEC) 20 MG capsule, Take 1 capsule by mouth Daily., Disp: , Rfl:   •  Selenium 200 MCG capsule, , Disp: , Rfl:   •  sulfaSALAzine (AZULFIDINE) 500 MG tablet, Take 2 tablets by mouth 2 (Two) Times a Day., Disp: , Rfl:   •  Turmeric 500 MG tablet, Take 1 tablet by mouth Daily., Disp: , Rfl:   •  vitamin E 100 UNIT capsule, Take 400 Units by mouth Daily., Disp: , Rfl:     Review of Systems   Constitutional: Negative for fatigue.   Neurological: Positive for dizziness. Negative for tremors, light-headedness and headaches.            Objective   Vital Signs:   Temp 96.6 °F (35.9 °C) (Infrared)   Ht 165.1 cm (65\")   Wt 78 kg (172 lb)   BMI 28.62 kg/m²     Physical Exam  Vitals reviewed.   Constitutional:       Appearance: Normal appearance. She is normal weight.   Eyes:      Extraocular Movements: Extraocular movements intact.      Conjunctiva/sclera: " Conjunctivae normal.      Pupils: Pupils are equal, round, and reactive to light.   Cardiovascular:      Rate and Rhythm: Normal rate.   Pulmonary:      Effort: Pulmonary effort is normal. No respiratory distress.   Musculoskeletal:         General: No deformity. Normal range of motion.   Neurological:      General: No focal deficit present.      Mental Status: She is alert and oriented to person, place, and time.   Psychiatric:         Mood and Affect: Mood normal.        Result Review :                Neurologic Exam     Mental Status   Oriented to person, place, and time.     Cranial Nerves     CN III, IV, VI   Pupils are equal, round, and reactive to light.             Assessment and Plan    Diagnoses and all orders for this visit:    1. Loss of balance (Primary)    pt has no ankle reflex   May have some neuropathy causing poor balance.     Will obtain EMG of LE and send for vestibular rehab    Follow Up   Return in about 1 year (around 12/12/2023).  Patient was given instructions and counseling regarding her condition or for health maintenance advice. Please see specific information pulled into the AVS if appropriate.         This document has been electronically signed by Joseph Seipel, MD on December 12, 2022 10:08 EST

## 2022-12-16 ENCOUNTER — PATIENT ROUNDING (BHMG ONLY) (OUTPATIENT)
Dept: NEUROLOGY | Facility: CLINIC | Age: 72
End: 2022-12-16

## 2022-12-20 ENCOUNTER — LAB (OUTPATIENT)
Dept: LAB | Facility: HOSPITAL | Age: 72
End: 2022-12-20

## 2022-12-20 ENCOUNTER — TRANSCRIBE ORDERS (OUTPATIENT)
Dept: ADMINISTRATIVE | Facility: HOSPITAL | Age: 72
End: 2022-12-20

## 2022-12-20 DIAGNOSIS — L40.0 PSORIASIS VULGARIS: ICD-10-CM

## 2022-12-20 DIAGNOSIS — Z79.899 ENCOUNTER FOR LONG-TERM (CURRENT) USE OF OTHER MEDICATIONS: ICD-10-CM

## 2022-12-20 DIAGNOSIS — L40.0 PSORIASIS VULGARIS: Primary | ICD-10-CM

## 2022-12-20 PROCEDURE — 86480 TB TEST CELL IMMUN MEASURE: CPT

## 2022-12-20 PROCEDURE — 36415 COLL VENOUS BLD VENIPUNCTURE: CPT

## 2022-12-21 DIAGNOSIS — I10 ESSENTIAL HYPERTENSION: ICD-10-CM

## 2022-12-21 RX ORDER — CARVEDILOL 25 MG/1
50 TABLET ORAL 2 TIMES DAILY WITH MEALS
Qty: 360 TABLET | Refills: 1 | Status: SHIPPED | OUTPATIENT
Start: 2022-12-21

## 2022-12-22 LAB
GAMMA INTERFERON BACKGROUND BLD IA-ACNC: 0.02 IU/ML
M TB IFN-G BLD-IMP: NEGATIVE
M TB IFN-G CD4+ BCKGRND COR BLD-ACNC: 0.03 IU/ML
M TB IFN-G CD4+CD8+ BCKGRND COR BLD-ACNC: 0.02 IU/ML
MITOGEN IGNF BLD-ACNC: 0.91 IU/ML
QUANTIFERON INCUBATION: NORMAL
SERVICE CMNT-IMP: NORMAL

## 2023-01-09 ENCOUNTER — TELEPHONE (OUTPATIENT)
Dept: NEUROLOGY | Facility: CLINIC | Age: 73
End: 2023-01-09

## 2023-01-09 DIAGNOSIS — I10 ESSENTIAL HYPERTENSION: ICD-10-CM

## 2023-01-09 RX ORDER — AMLODIPINE BESYLATE 10 MG/1
10 TABLET ORAL
Qty: 90 TABLET | Refills: 1 | Status: SHIPPED | OUTPATIENT
Start: 2023-01-09

## 2023-01-09 NOTE — TELEPHONE ENCOUNTER
Provider: DR SEIPEL  Caller: PATIENT  Relationship to Patient: SELF  Phone Number: [phone]  Reason for Call: PATIENTS ORIGINAL EMG FOR 12/23/22 WAS CANCELED DUE TO WEATHER, PATIENT HAS BEEN RESCHEDULED TO 2/21/23. PATIENT IS DUE TO START PHYSICAL THERAPY 1/19/23 AND WANTED TO KNOW IF EMG NEEDED TO BE COMPLETED PRIOR TO STARTING. PLEASE REVIEW AND ADVISE, THANK YOU.

## 2023-01-18 DIAGNOSIS — E78.2 MIXED HYPERLIPIDEMIA: ICD-10-CM

## 2023-01-18 RX ORDER — ATORVASTATIN CALCIUM 20 MG/1
20 TABLET, FILM COATED ORAL NIGHTLY
Qty: 90 TABLET | Refills: 0 | Status: SHIPPED | OUTPATIENT
Start: 2023-01-18

## 2023-02-02 ENCOUNTER — OFFICE VISIT (OUTPATIENT)
Dept: FAMILY MEDICINE CLINIC | Facility: CLINIC | Age: 73
End: 2023-02-02
Payer: MEDICARE

## 2023-02-02 VITALS
TEMPERATURE: 99.7 F | SYSTOLIC BLOOD PRESSURE: 160 MMHG | BODY MASS INDEX: 28.82 KG/M2 | OXYGEN SATURATION: 93 % | HEART RATE: 88 BPM | WEIGHT: 173 LBS | DIASTOLIC BLOOD PRESSURE: 69 MMHG | HEIGHT: 65 IN

## 2023-02-02 DIAGNOSIS — J06.9 ACUTE URI: Primary | ICD-10-CM

## 2023-02-02 DIAGNOSIS — R05.1 ACUTE COUGH: ICD-10-CM

## 2023-02-02 DIAGNOSIS — R91.8 MULTIPLE LUNG NODULES ON CT: ICD-10-CM

## 2023-02-02 LAB
EXPIRATION DATE: NORMAL
FLUAV AG NPH QL: NEGATIVE
FLUBV AG NPH QL: NEGATIVE
INTERNAL CONTROL: NORMAL
Lab: NORMAL

## 2023-02-02 PROCEDURE — 87804 INFLUENZA ASSAY W/OPTIC: CPT

## 2023-02-02 PROCEDURE — 99213 OFFICE O/P EST LOW 20 MIN: CPT

## 2023-02-02 RX ORDER — BENZONATATE 100 MG/1
100 CAPSULE ORAL 3 TIMES DAILY PRN
Qty: 21 CAPSULE | Refills: 0 | Status: SHIPPED | OUTPATIENT
Start: 2023-02-02 | End: 2023-02-09

## 2023-02-02 RX ORDER — AZITHROMYCIN 250 MG/1
TABLET, FILM COATED ORAL
Qty: 6 TABLET | Refills: 0 | Status: SHIPPED | OUTPATIENT
Start: 2023-02-02 | End: 2023-02-21

## 2023-02-02 NOTE — PROGRESS NOTES
"Chief Complaint  Cough (She this started over a week ago it got better a few days and now it's all back. Tested negative for covid a couple times.), Fever, and Generalized Body Aches    Subjective        Meliza Red presents to [facility]  History of Present Illness     Pt is here for cough, fever, body aches. She started feeling bad 2 weeks ago with these symptoms, and it lasted for about a week. She tested negative for covid on a home test. Started feeling better for 3 days. She then got to feeling bad again with the same symptoms 3 days later. Her  was sick with her the first time, but not the second.     She reports chills, sweats, dry cough, congestion. She denies sore throat, ear pain, chest pain, and chest pressure. Her appetite is good. She states that she feels the same, not better or worse.    She has tried taking mucinex, tylenol. They have helped with her symptoms. She is also drinking lots of water.    I encouraged her to take guaifenesin alone, no phenylephrine, as her blood pressure is up and she has been on this several days already.      Objective   Vital Signs:  /69 (BP Location: Left arm, Patient Position: Sitting, Cuff Size: Adult)   Pulse 88   Temp 99.7 °F (37.6 °C) (Oral)   Ht 165.1 cm (65\")   Wt 78.5 kg (173 lb)   SpO2 93%   BMI 28.79 kg/m²   Estimated body mass index is 28.79 kg/m² as calculated from the following:    Height as of this encounter: 165.1 cm (65\").    Weight as of this encounter: 78.5 kg (173 lb).                Physical Exam   Result Review :                Assessment and Plan   Diagnoses and all orders for this visit:    1. Acute URI (Primary)  -     azithromycin (Zithromax Z-Jaxon) 250 MG tablet; Take 2 tablets by mouth on day 1, then 1 tablet daily on days 2-5  Dispense: 6 tablet; Refill: 0  -     POCT Influenza A/B    2. Acute cough  -     benzonatate (Tessalon Perles) 100 MG capsule; Take 1 capsule by mouth 3 (Three) " Times a Day As Needed for Cough for up to 7 days.  Dispense: 21 capsule; Refill: 0  -     POCT Influenza A/B    3. Multiple lung nodules on CT             Follow Up   Return if symptoms worsen or fail to improve, for Next scheduled follow up.  Patient was given instructions and counseling regarding her condition or for health maintenance advice. Please see specific information pulled into the AVS if appropriate.

## 2023-02-08 ENCOUNTER — OFFICE (AMBULATORY)
Dept: URBAN - METROPOLITAN AREA CLINIC 64 | Facility: CLINIC | Age: 73
End: 2023-02-08

## 2023-02-08 VITALS
HEART RATE: 70 BPM | WEIGHT: 170 LBS | DIASTOLIC BLOOD PRESSURE: 79 MMHG | HEIGHT: 65 IN | SYSTOLIC BLOOD PRESSURE: 122 MMHG

## 2023-02-08 DIAGNOSIS — K51.50 LEFT SIDED COLITIS WITHOUT COMPLICATIONS: ICD-10-CM

## 2023-02-08 DIAGNOSIS — R79.89 OTHER SPECIFIED ABNORMAL FINDINGS OF BLOOD CHEMISTRY: ICD-10-CM

## 2023-02-08 DIAGNOSIS — R74.8 ABNORMAL LEVELS OF OTHER SERUM ENZYMES: ICD-10-CM

## 2023-02-08 DIAGNOSIS — K59.1 FUNCTIONAL DIARRHEA: ICD-10-CM

## 2023-02-08 DIAGNOSIS — F10.10 ALCOHOL ABUSE, UNCOMPLICATED: ICD-10-CM

## 2023-02-08 DIAGNOSIS — Z87.891 PERSONAL HISTORY OF NICOTINE DEPENDENCE: ICD-10-CM

## 2023-02-08 PROCEDURE — 99214 OFFICE O/P EST MOD 30 MIN: CPT | Performed by: INTERNAL MEDICINE

## 2023-02-13 ENCOUNTER — TREATMENT (OUTPATIENT)
Dept: PHYSICAL THERAPY | Facility: CLINIC | Age: 73
End: 2023-02-13
Payer: MEDICARE

## 2023-02-13 DIAGNOSIS — Z78.9 IMPAIRED MOBILITY AND ADLS: ICD-10-CM

## 2023-02-13 DIAGNOSIS — Z74.09 IMPAIRED MOBILITY AND ADLS: ICD-10-CM

## 2023-02-13 DIAGNOSIS — R26.89 BALANCE PROBLEM: Primary | ICD-10-CM

## 2023-02-13 PROCEDURE — 97112 NEUROMUSCULAR REEDUCATION: CPT | Performed by: PHYSICAL THERAPIST

## 2023-02-13 PROCEDURE — 97162 PT EVAL MOD COMPLEX 30 MIN: CPT | Performed by: PHYSICAL THERAPIST

## 2023-02-13 NOTE — PROGRESS NOTES
Physical Therapy Initial Evaluation and Plan of Care     [address] [facility], [address] [place]  [address]    Patient: Meliza Red   : 1950  Diagnosis/ICD-10 Code:  Balance problem [R26.89]  Referring practitioner: Joseph F Seipel, MD  Date of Initial Visit: 2023  Today's Date: 2023  Patient seen for 1 sessions           Subjective Questionnaire: ABC: 58      Subjective Evaluation    History of Present Illness  Mechanism of injury: Pt reports she has had balance and feeling woozy issues for a few years then had gotten worse when about a year ago she started to see double and she would spin upon sitting up from bed.  She had cataract surgery to try to get rid of the double vision and it worked but she has a smudge in her vision on the left that is being looked at but not remedied in the year since the surgery.  She saw a neurologist and he sent her to PT, will be having a nerve test done on her legs this month.    She has a history or back surgery x 2; R THR; numbness and tingling in both legs; knee OA; fx R ankle; h/o HTN    She has difficulty with reaching up into cabinets, trim bushes; walking straight and as fast as usual    Pain  Current pain ratin         Objective     Additional subjective information:   Vestibular testing completed:  Negative BPPV at ENT recently   Vision problems/correction:  cataract surgery with smudge in left; tri focals   Hearing problems:  none   History of falls:  Yes;  Several in the past few years but less the last few months              MRI brain negative      Symptoms last a matter of:  Hours    Symptoms feel like:  Being off balance, woozy   Concurrent symptoms:  HA; n/v; blurry vision; aural fullness; tinnitus;dec memory      Objective:     Oculomotor and VOR:      Spontaneous nystagmus:  neg    Gaze-evoked nystagmus:  neg    Skew deviation:  neg    Head-shake nystagmus:    Smooth pursuit:  neg    Saccades:  neg    VORc:  neg    Head thrust:  R/L   n/t        SVA:  20/15    DVA:  Horiz:  20/20              Vert:  20/20     LE strength:  Right:  Hip flex 4-; knee ext/flex and ankle DF 4; hip abd and ext 4-             Left: Hip flex 4-; knee ext/flex and ankle DF 4; hip abd and ext 4-   LE AROM:  Right:  WFL                      Left:  WFL     Cervical AROM:  All WFL; no pain or dizziness     Vertebral artery screen:  neg     Cerebellar function:  UE:  finger to nose:  neg                                                       BINDU:  neg                                     LE:  BINDU:  Impaired bilat L> R                                                       Heel to shin:  n/t     BPPV Assessment:  Deferred     Roll Test:  Right:           Left:    [place] Hallpike:  Right:                          Left:     MCTSIB:  cond 1:  30 sec no inc sway  `                           cond 2:  10 sec x 2 with LOB post                              cond 3:  30 sec with min inc sway                              cond 4:  5 sec with LOB post     DGI:  19/24     Gait:  amb (I) without AD; pt with dec trunk rotation bilat; dec arm swing; wide SANTIAGO      Assessment & Plan     Assessment  Impairments: abnormal gait, impaired balance and lacks appropriate home exercise program  Functional Limitations: walking, standing, stooping and reaching overhead  Assessment details: Pt is a 72 y/o female with a dx of balance problem and dizziness onset several years ago with worsening in the last year.      She has difficulty with  reaching up into cabinets, trim bushes; walking straight and as fast as usual    Pt exhibits the above impairments and functional limitations and would benefit from skilled PT to address those areas and maximize function.    Goals  Plan Goals: STGs:  6 weeks  1.  Pt to tolerate initial HEP/post-treatment regimen.  2.  Pt to tolerate advancement of HEP as indicated.  3.  Pt to report at least 25% improvement in off balance symptoms.  4.  Resolve BPPV as indicated by negative  bilateral Roll Tests and [place] Hallpike.   5.  Pt to improve MCTSIB cond 2 to 30 sec with no LOB    LTGs:  By DC  1.  Pt to be (I) with HEP to manage own condition.  2.  Pt to report at least 50% improvement in balance issues symptoms.  3.  Pt to improve function as indicated by improved score on ABC as compared to eval.  4.  Pt to improve balance as indicated by improved performance on DGI.    Plan  Therapy options: will be seen for skilled therapy services  Planned therapy interventions: balance/weight-bearing training, abdominal trunk stabilization, functional ROM exercises, gait training, home exercise program, transfer training, therapeutic activities, strengthening, postural training, neuromuscular re-education, motor coordination training and manual therapy  Frequency: 2x week  Plan details: 12 weeks      History # of Personal Factors and/or Comorbidities: MODERATE (1-2)  Examination of Body System(s): # of elements: MODERATE (3)  Clinical Presentation: EVOLVING  Clinical Decision Making: MODERATE      Timed:         Manual Therapy:         mins  27090;     Therapeutic Exercise:         mins  54747;     Neuromuscular Kathleen: 15       mins  61887;    Therapeutic Activity:          mins  88857;     Gait Training:           mins  53386;     Ultrasound:          mins  00281;    Ionto:                                   mins   26933  Self Care:                            mins   18589  Canalith Repos:                  mins   02340    Un-Timed:  Electrical Stimulation:         mins  42725 ( );  Traction          mins 75911  Low Eval          Mins  96795  Mod Eval   40       Mins  66240  High Eval                            Mins  16826  Re-Eval                               mins  03829    Timed Treatment: 15     mins   Total Treatment:   55     mins    PT SIGNATURE: Shari Lewis PT   [place] PT Lic. # [id number]    DATE TREATMENT INITIATED: 2/13/2023    Initial Certification  Certification Period: 5/13/2023  I certify  that the therapy services are furnished while this patient is under my care.  The services outlined above are required by this patient, and will be reviewed every 90 days.     PHYSICIAN: Seipel, Joseph F, MD  NPI: [id number]                                       DATE:     Please sign and return via fax to [phone].. Thank you, [facility].

## 2023-02-16 NOTE — PROGRESS NOTES
EMG and Nerve Conduction Studies    The complete report includes the data sheets.     Date of Study: 2/21/23    Referring Provider:DR. SEIPEL        History:    BLE- WEAKNESS  This patient is a 73-year-old female who complains of paresthesia and weakness in the legs bilaterally.  Results:    Prior to starting the procedure, the patient's identity was verified, pertinent available records were reviewed, the nature of the procedure was explained, the appropriate site of the exam were confirmed directly with the patient, and a pre-procedure pause was performed for final verification of all the above.    1.  The right medial sensory distal latency is just slightly prolonged above the upper limit of normal the amplitude of the sensory nerve action potentials were reduced.  The left medial plantar distal latency is within normal limits the amplitude of the sensory nerve action potentials are reduced.    2.  The right sural sensory nerve conduction study was normal.    3.  The right tibial motor nerve conduction study was normal.    4.  EMG of the muscles examined in the leg that is the tibialis anterior vastus lateralis peroneus longus and gastrocnemius muscles were normal.  The muscles in the feet that is the abductor analysis and extensor digitorum brevis muscle showed increased insertional activity and decreased recruitment with some fibrillations in the extensor digitorum brevis muscle.    Impression:    This is an abnormal study.  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      Electronically signed by :    Joseph Seipel, M.D.  February 21, 2023

## 2023-02-17 ENCOUNTER — OFFICE (AMBULATORY)
Dept: URBAN - METROPOLITAN AREA LAB 2 | Facility: LAB | Age: 73
End: 2023-02-17

## 2023-02-17 DIAGNOSIS — R19.7 DIARRHEA, UNSPECIFIED: ICD-10-CM

## 2023-02-17 DIAGNOSIS — I10 ESSENTIAL HYPERTENSION: ICD-10-CM

## 2023-02-17 PROCEDURE — 87324 CLOSTRIDIUM AG IA: CPT | Mod: 59 | Performed by: INTERNAL MEDICINE

## 2023-02-17 PROCEDURE — 87449 NOS EACH ORGANISM AG IA: CPT | Performed by: INTERNAL MEDICINE

## 2023-02-17 NOTE — TELEPHONE ENCOUNTER
Caller: Meliza Red    Relationship: Self    Best call back number: [phone]  Requested Prescriptions:   Requested Prescriptions     Pending Prescriptions Disp Refills   • lisinopril-hydrochlorothiazide (PRINZIDE,ZESTORETIC) 20-12.5 MG per tablet 180 tablet 1     Sig: Take 2 tablets by mouth Daily With Breakfast.        Pharmacy where request should be sent: [facility] [id number] [facility], [place] - [address] - [phone] [facility] [phone] FX       Does the patient have less than a 3 day supply:  [] Yes  [x] No    Would you like a call back once the refill request has been completed: [] Yes [x] No    If the office needs to give you a call back, can they leave a voicemail: [] Yes [x] No    Malachi Allen   02/17/23 15:55 EST

## 2023-02-18 RX ORDER — LISINOPRIL AND HYDROCHLOROTHIAZIDE 20; 12.5 MG/1; MG/1
2 TABLET ORAL
Qty: 180 TABLET | Refills: 1 | Status: SHIPPED | OUTPATIENT
Start: 2023-02-18

## 2023-02-21 ENCOUNTER — TELEPHONE (OUTPATIENT)
Dept: NEUROLOGY | Facility: CLINIC | Age: 73
End: 2023-02-21

## 2023-02-21 ENCOUNTER — PROCEDURE VISIT (OUTPATIENT)
Dept: NEUROLOGY | Facility: CLINIC | Age: 73
End: 2023-02-21
Payer: MEDICARE

## 2023-02-21 VITALS
HEIGHT: 65 IN | TEMPERATURE: 97.8 F | SYSTOLIC BLOOD PRESSURE: 107 MMHG | BODY MASS INDEX: 28.82 KG/M2 | WEIGHT: 173 LBS | DIASTOLIC BLOOD PRESSURE: 65 MMHG | HEART RATE: 86 BPM

## 2023-02-21 DIAGNOSIS — R20.2 PARESTHESIA OF BOTH FEET: ICD-10-CM

## 2023-02-21 DIAGNOSIS — G62.89 AXONAL SENSORIMOTOR NEUROPATHY: Primary | ICD-10-CM

## 2023-02-21 PROBLEM — K63.5 COLON POLYPS: Status: ACTIVE | Noted: 2023-02-21

## 2023-02-21 PROBLEM — R79.89 ELEVATED FERRITIN: Status: ACTIVE | Noted: 2023-02-21

## 2023-02-21 PROBLEM — R15.9 FECAL INCONTINENCE: Status: ACTIVE | Noted: 2023-02-21

## 2023-02-21 PROBLEM — E78.00 PURE HYPERCHOLESTEROLEMIA: Status: ACTIVE | Noted: 2023-02-21

## 2023-02-21 PROBLEM — K57.92 DIVERTICULITIS OF INTESTINE, PART UNSPECIFIED, WITHOUT PERFORATION OR ABSCESS WITHOUT BLEEDING: Status: ACTIVE | Noted: 2023-02-21

## 2023-02-21 PROBLEM — Z80.9 FAMILY HISTORY OF MALIGNANT NEOPLASM, UNSPECIFIED: Status: ACTIVE | Noted: 2023-02-21

## 2023-02-21 PROBLEM — F41.9 ANXIETY DISORDER: Status: ACTIVE | Noted: 2023-02-21

## 2023-02-21 PROBLEM — F32.9 MAJOR DEPRESSIVE DISORDER, SINGLE EPISODE, UNSPECIFIED: Status: ACTIVE | Noted: 2023-02-21

## 2023-02-21 PROBLEM — R10.32 LEFT LOWER QUADRANT PAIN: Status: ACTIVE | Noted: 2023-02-21

## 2023-02-21 PROCEDURE — 95886 MUSC TEST DONE W/N TEST COMP: CPT | Performed by: PSYCHIATRY & NEUROLOGY

## 2023-02-21 PROCEDURE — 95908 NRV CNDJ TST 3-4 STUDIES: CPT | Performed by: PSYCHIATRY & NEUROLOGY

## 2023-02-21 RX ORDER — BRIMONIDINE TARTRATE 2 MG/ML
SOLUTION/ DROPS OPHTHALMIC EVERY 12 HOURS
COMMUNITY
Start: 2023-02-15 | End: 2023-04-05

## 2023-02-23 ENCOUNTER — TREATMENT (OUTPATIENT)
Dept: PHYSICAL THERAPY | Facility: CLINIC | Age: 73
End: 2023-02-23
Payer: MEDICARE

## 2023-02-23 ENCOUNTER — LAB (OUTPATIENT)
Dept: LAB | Facility: HOSPITAL | Age: 73
End: 2023-02-23
Payer: MEDICARE

## 2023-02-23 DIAGNOSIS — R20.2 PARESTHESIA OF BOTH FEET: ICD-10-CM

## 2023-02-23 DIAGNOSIS — R26.89 BALANCE PROBLEM: Primary | ICD-10-CM

## 2023-02-23 DIAGNOSIS — G62.89 AXONAL SENSORIMOTOR NEUROPATHY: ICD-10-CM

## 2023-02-23 LAB
BASOPHILS # BLD AUTO: 0.04 10*3/MM3 (ref 0–0.2)
BASOPHILS NFR BLD AUTO: 0.6 % (ref 0–1.5)
CERULOPLASMIN SERPL-MCNC: 17 MG/DL (ref 19–39)
CHROMATIN AB SERPL-ACNC: 17.8 IU/ML (ref 0–14)
DEPRECATED RDW RBC AUTO: 42.8 FL (ref 37–54)
EOSINOPHIL # BLD AUTO: 0.04 10*3/MM3 (ref 0–0.4)
EOSINOPHIL NFR BLD AUTO: 0.6 % (ref 0.3–6.2)
ERYTHROCYTE [DISTWIDTH] IN BLOOD BY AUTOMATED COUNT: 12.3 % (ref 12.3–15.4)
FOLATE SERPL-MCNC: >20 NG/ML (ref 4.78–24.2)
HCT VFR BLD AUTO: 37.5 % (ref 34–46.6)
HGB BLD-MCNC: 12.7 G/DL (ref 12–15.9)
IMM GRANULOCYTES # BLD AUTO: 0.02 10*3/MM3 (ref 0–0.05)
IMM GRANULOCYTES NFR BLD AUTO: 0.3 % (ref 0–0.5)
LYMPHOCYTES # BLD AUTO: 2.08 10*3/MM3 (ref 0.7–3.1)
LYMPHOCYTES NFR BLD AUTO: 29.1 % (ref 19.6–45.3)
MCH RBC QN AUTO: 32.2 PG (ref 26.6–33)
MCHC RBC AUTO-ENTMCNC: 33.9 G/DL (ref 31.5–35.7)
MCV RBC AUTO: 95.2 FL (ref 79–97)
MONOCYTES # BLD AUTO: 0.75 10*3/MM3 (ref 0.1–0.9)
MONOCYTES NFR BLD AUTO: 10.5 % (ref 5–12)
NEUTROPHILS NFR BLD AUTO: 4.23 10*3/MM3 (ref 1.7–7)
NEUTROPHILS NFR BLD AUTO: 58.9 % (ref 42.7–76)
NRBC BLD AUTO-RTO: 0 /100 WBC (ref 0–0.2)
PLATELET # BLD AUTO: 264 10*3/MM3 (ref 140–450)
PMV BLD AUTO: 9.4 FL (ref 6–12)
RBC # BLD AUTO: 3.94 10*6/MM3 (ref 3.77–5.28)
TSH SERPL DL<=0.05 MIU/L-ACNC: 2.4 UIU/ML (ref 0.27–4.2)
VIT B12 BLD-MCNC: 680 PG/ML (ref 211–946)
WBC NRBC COR # BLD: 7.16 10*3/MM3 (ref 3.4–10.8)

## 2023-02-23 PROCEDURE — 86592 SYPHILIS TEST NON-TREP QUAL: CPT

## 2023-02-23 PROCEDURE — 83921 ORGANIC ACID SINGLE QUANT: CPT

## 2023-02-23 PROCEDURE — 82784 ASSAY IGA/IGD/IGG/IGM EACH: CPT

## 2023-02-23 PROCEDURE — 97110 THERAPEUTIC EXERCISES: CPT | Performed by: PHYSICAL THERAPIST

## 2023-02-23 PROCEDURE — 86431 RHEUMATOID FACTOR QUANT: CPT

## 2023-02-23 PROCEDURE — 82390 ASSAY OF CERULOPLASMIN: CPT

## 2023-02-23 PROCEDURE — 85025 COMPLETE CBC W/AUTO DIFF WBC: CPT

## 2023-02-23 PROCEDURE — 84443 ASSAY THYROID STIM HORMONE: CPT

## 2023-02-23 PROCEDURE — 82746 ASSAY OF FOLIC ACID SERUM: CPT

## 2023-02-23 PROCEDURE — 84446 ASSAY OF VITAMIN E: CPT

## 2023-02-23 PROCEDURE — 86334 IMMUNOFIX E-PHORESIS SERUM: CPT

## 2023-02-23 PROCEDURE — 97112 NEUROMUSCULAR REEDUCATION: CPT | Performed by: PHYSICAL THERAPIST

## 2023-02-23 PROCEDURE — 82607 VITAMIN B-12: CPT

## 2023-02-23 PROCEDURE — 36415 COLL VENOUS BLD VENIPUNCTURE: CPT

## 2023-02-23 PROCEDURE — 84207 ASSAY OF VITAMIN B-6: CPT

## 2023-02-23 PROCEDURE — 84155 ASSAY OF PROTEIN SERUM: CPT

## 2023-02-23 PROCEDURE — 82525 ASSAY OF COPPER: CPT

## 2023-02-23 PROCEDURE — 86038 ANTINUCLEAR ANTIBODIES: CPT

## 2023-02-23 PROCEDURE — 84165 PROTEIN E-PHORESIS SERUM: CPT

## 2023-02-23 NOTE — PROGRESS NOTES
Physical Therapy Daily Treatment Note    Patient: Meliza Red   : 1950  Referring practitioner: Joseph F Seipel, MD  Diagnoses: Balance problem [R26.89]  Today's Date: 2023    VISIT#: 2    Subjective Evaluation    History of Present Illness  Mechanism of injury: Pt reports she had the nerve test yesterday and he did not really find anything other than one area of the right foot and she did not have normal reflexes in her ankles..    Pain  Current pain ratin       Objective     See Exercise, Manual, and Modality Logs for complete treatment.     initiated ex program today for ex, NMR as noted; written instr issued and pt voices understanding      Assessment & Plan     Assessment    Assessment details: Pt nicko fair today with some discomfort with balance work due to feeling unsteady      Progress per Plan of Care        Timed:         Manual Therapy:         mins  30497;     Therapeutic Exercise:  15       mins  57680;     Neuromuscular Kathleen:15        mins  61592;    Therapeutic Activity:          mins  32390;     Gait Training:           mins  86145;     Ultrasound:          mins  69407;    Ionto                                   mins   93600  Self Care                            mins   99771  Canalith Repos                   mins  67811    Un-Timed:  Electrical Stimulation:         mins  36154 ( );  Traction          mins 62759  Low Eval          Mins  44326  Mod Eval          Mins  56530  High Eval                            Mins  73531  Re-Eval                               mins  55022    Timed Treatment: 30     mins   Total Treatment:   30     mins    Shari Lewis PT  Physical Therapist  [place] PT Lic. # [id number]

## 2023-02-24 LAB
ALBUMIN SERPL ELPH-MCNC: 3.9 G/DL (ref 2.9–4.4)
ALBUMIN/GLOB SERPL: 1.3 {RATIO} (ref 0.7–1.7)
ALPHA1 GLOB SERPL ELPH-MCNC: 0.3 G/DL (ref 0–0.4)
ALPHA2 GLOB SERPL ELPH-MCNC: 0.7 G/DL (ref 0.4–1)
ANA SER QL: NEGATIVE
B-GLOBULIN SERPL ELPH-MCNC: 1.2 G/DL (ref 0.7–1.3)
GAMMA GLOB SERPL ELPH-MCNC: 1 G/DL (ref 0.4–1.8)
GLOBULIN SER-MCNC: 3.1 G/DL (ref 2.2–3.9)
IGA SERPL-MCNC: 251 MG/DL (ref 64–422)
IGG SERPL-MCNC: 984 MG/DL (ref 586–1602)
IGM SERPL-MCNC: 106 MG/DL (ref 26–217)
INTERPRETATION SERPL IEP-IMP: NORMAL
LABORATORY COMMENT REPORT: NORMAL
M PROTEIN SERPL ELPH-MCNC: NORMAL G/DL
PROT SERPL-MCNC: 7 G/DL (ref 6–8.5)
RPR SER QL: NORMAL

## 2023-02-25 LAB — COPPER SERPL-MCNC: 75 UG/DL (ref 80–158)

## 2023-02-26 LAB — PYRIDOXAL PHOS SERPL-MCNC: 22.7 UG/L (ref 3.4–65.2)

## 2023-03-02 ENCOUNTER — TREATMENT (OUTPATIENT)
Dept: PHYSICAL THERAPY | Facility: CLINIC | Age: 73
End: 2023-03-02
Payer: MEDICARE

## 2023-03-02 DIAGNOSIS — Z74.09 IMPAIRED MOBILITY AND ADLS: ICD-10-CM

## 2023-03-02 DIAGNOSIS — Z78.9 IMPAIRED MOBILITY AND ADLS: ICD-10-CM

## 2023-03-02 DIAGNOSIS — R26.89 BALANCE PROBLEM: Primary | ICD-10-CM

## 2023-03-02 PROCEDURE — 97112 NEUROMUSCULAR REEDUCATION: CPT | Performed by: PHYSICAL THERAPIST

## 2023-03-02 PROCEDURE — 97110 THERAPEUTIC EXERCISES: CPT | Performed by: PHYSICAL THERAPIST

## 2023-03-02 NOTE — PROGRESS NOTES
Physical Therapy Daily Treatment Note    Patient: Meliza Red   : 1950  Referring practitioner: Joseph F Seipel, MD  Diagnoses: Balance problem [R26.89]  Today's Date: 3/2/2023    VISIT#: 3    Subjective Evaluation    History of Present Illness  Mechanism of injury: Pt reports she has been doing her ex some but not as much as she should have.  She had a lot going on and was busy so she did not get to do them as much as she wanted to.  Her waist is sore on the left side and across her back.  Her back does hurt at times when she does things she should not do.      Pain  Current pain ratin  Location: LB; left side         Objective     See Exercise, Manual, and Modality Logs for complete treatment.     Cont with ex and NMR as noted; progressed with bal ex today    Patient Education:  Cont with HEP    Assessment & Plan     Assessment    Assessment details: nicko well today with exercise and balance work      Progress per Plan of Care        Timed:         Manual Therapy:         mins  64312;     Therapeutic Exercise:  20       mins  71193;     Neuromuscular Kathleen: 15       mins  60885;    Therapeutic Activity:          mins  56602;     Gait Training:           mins  69823;     Ultrasound:          mins  21509;    Ionto                                   mins   32397  Self Care                            mins   41669  Canalith Repos                   mins  74139    Un-Timed:  Electrical Stimulation:         mins  59142 ( );  Traction          mins 85230  Low Eval          Mins  67026  Mod Eval          Mins  90056  High Eval                            Mins  02643  Re-Eval                               mins  63772    Timed Treatment: 35     mins   Total Treatment:   35     mins    Shari Lewis PT  Physical Therapist  [place] PT Lic. # [id number]

## 2023-03-03 LAB
A-TOCOPHEROL VIT E SERPL-MCNC: 35.1 MG/L (ref 9–29)
GAMMA TOCOPHEROL SERPL-MCNC: 0.8 MG/L (ref 0.5–4.9)

## 2023-03-06 ENCOUNTER — TREATMENT (OUTPATIENT)
Dept: PHYSICAL THERAPY | Facility: CLINIC | Age: 73
End: 2023-03-06
Payer: MEDICARE

## 2023-03-06 DIAGNOSIS — Z78.9 IMPAIRED MOBILITY AND ADLS: ICD-10-CM

## 2023-03-06 DIAGNOSIS — Z74.09 IMPAIRED MOBILITY AND ADLS: ICD-10-CM

## 2023-03-06 DIAGNOSIS — R26.89 BALANCE PROBLEM: Primary | ICD-10-CM

## 2023-03-06 PROCEDURE — 97530 THERAPEUTIC ACTIVITIES: CPT | Performed by: PHYSICAL THERAPIST

## 2023-03-06 PROCEDURE — 97110 THERAPEUTIC EXERCISES: CPT | Performed by: PHYSICAL THERAPIST

## 2023-03-06 PROCEDURE — 97112 NEUROMUSCULAR REEDUCATION: CPT | Performed by: PHYSICAL THERAPIST

## 2023-03-06 NOTE — PROGRESS NOTES
Physical Therapy Daily Treatment Note    Patient: Meliza Red   : 1950  Referring practitioner: Joseph F Seipel, MD  Diagnoses: Balance problem [R26.89]  Today's Date: 3/6/2023    VISIT#: 4    Subjective Evaluation    History of Present Illness  Mechanism of injury: Pt has been having some more back pain since last session.  She has been able to avoid pain with leg lifts on her belly with a pillow under the hips.  She notes pain with other ex due to the effort of them.      Pain  Current pain ratin         Objective     See Exercise, Manual, and Modality Logs for complete treatment.     Cont with ex, NMR and act as noted  Added to HEP as noted       Assessment & Plan     Assessment    Assessment details: nicko well with session today; progressed with strengthening, motor control and activities        Progress per Plan of Care        Timed:         Manual Therapy:         mins  34295;     Therapeutic Exercise:  15       mins  98231;     Neuromuscular Kathleen: 30       mins  54228;    Therapeutic Activity:   10       mins  68368;     Gait Training:           mins  45145;     Ultrasound:          mins  15944;    Ionto                                   mins   55091  Self Care                            mins   85812  Canalith Repos                   mins  11097    Un-Timed:  Electrical Stimulation:         mins  36398 ( );  Traction          mins 04430  Low Eval          Mins  76582  Mod Eval          Mins  79771  High Eval                            Mins  03803  Re-Eval                               mins  11793    Timed Treatment: 55     mins   Total Treatment:   55     mins    Shari Lewis PT  Physical Therapist  [place] PT Lic. # [id number]

## 2023-03-08 ENCOUNTER — OFFICE (AMBULATORY)
Dept: URBAN - METROPOLITAN AREA PATHOLOGY 4 | Facility: PATHOLOGY | Age: 73
End: 2023-03-08
Payer: COMMERCIAL

## 2023-03-08 ENCOUNTER — ON CAMPUS - OUTPATIENT (AMBULATORY)
Dept: URBAN - METROPOLITAN AREA HOSPITAL 2 | Facility: HOSPITAL | Age: 73
End: 2023-03-08

## 2023-03-08 ENCOUNTER — OFFICE (AMBULATORY)
Dept: URBAN - METROPOLITAN AREA PATHOLOGY 4 | Facility: PATHOLOGY | Age: 73
End: 2023-03-08

## 2023-03-08 VITALS
DIASTOLIC BLOOD PRESSURE: 44 MMHG | DIASTOLIC BLOOD PRESSURE: 62 MMHG | DIASTOLIC BLOOD PRESSURE: 65 MMHG | OXYGEN SATURATION: 92 % | HEART RATE: 64 BPM | HEART RATE: 68 BPM | SYSTOLIC BLOOD PRESSURE: 104 MMHG | RESPIRATION RATE: 17 BRPM | SYSTOLIC BLOOD PRESSURE: 112 MMHG | SYSTOLIC BLOOD PRESSURE: 129 MMHG | RESPIRATION RATE: 14 BRPM | SYSTOLIC BLOOD PRESSURE: 101 MMHG | DIASTOLIC BLOOD PRESSURE: 61 MMHG | HEART RATE: 73 BPM | SYSTOLIC BLOOD PRESSURE: 78 MMHG | WEIGHT: 170 LBS | OXYGEN SATURATION: 98 % | HEART RATE: 66 BPM | DIASTOLIC BLOOD PRESSURE: 60 MMHG | OXYGEN SATURATION: 93 % | HEART RATE: 69 BPM | DIASTOLIC BLOOD PRESSURE: 64 MMHG | SYSTOLIC BLOOD PRESSURE: 93 MMHG | HEART RATE: 76 BPM | HEIGHT: 65 IN | RESPIRATION RATE: 16 BRPM | TEMPERATURE: 97 F | OXYGEN SATURATION: 99 % | HEART RATE: 60 BPM | HEART RATE: 80 BPM | DIASTOLIC BLOOD PRESSURE: 57 MMHG | HEART RATE: 65 BPM | SYSTOLIC BLOOD PRESSURE: 96 MMHG | SYSTOLIC BLOOD PRESSURE: 131 MMHG | SYSTOLIC BLOOD PRESSURE: 108 MMHG | OXYGEN SATURATION: 96 % | OXYGEN SATURATION: 97 %

## 2023-03-08 DIAGNOSIS — D12.4 BENIGN NEOPLASM OF DESCENDING COLON: ICD-10-CM

## 2023-03-08 DIAGNOSIS — K57.32 DIVERTICULITIS OF LARGE INTESTINE WITHOUT PERFORATION OR ABS: ICD-10-CM

## 2023-03-08 DIAGNOSIS — K51.50 LEFT SIDED COLITIS WITHOUT COMPLICATIONS: ICD-10-CM

## 2023-03-08 DIAGNOSIS — K52.9 NONINFECTIVE GASTROENTERITIS AND COLITIS, UNSPECIFIED: ICD-10-CM

## 2023-03-08 PROBLEM — K63.5 POLYP OF COLON: Status: ACTIVE | Noted: 2023-03-08

## 2023-03-08 LAB
GI HISTOLOGY: A. UNSPECIFIED: (no result)
GI HISTOLOGY: B. UNSPECIFIED: (no result)
GI HISTOLOGY: PDF REPORT: (no result)
METHYLMALONATE SERPL-SCNC: 119 NMOL/L (ref 0–378)

## 2023-03-08 PROCEDURE — 45380 COLONOSCOPY AND BIOPSY: CPT | Mod: 59,PT | Performed by: INTERNAL MEDICINE

## 2023-03-08 PROCEDURE — 88305 TISSUE EXAM BY PATHOLOGIST: CPT | Mod: 26 | Performed by: INTERNAL MEDICINE

## 2023-03-08 PROCEDURE — 45385 COLONOSCOPY W/LESION REMOVAL: CPT | Mod: PT | Performed by: INTERNAL MEDICINE

## 2023-03-08 PROCEDURE — 45380 COLONOSCOPY AND BIOPSY: CPT | Mod: PT,59 | Performed by: INTERNAL MEDICINE

## 2023-03-08 RX ORDER — METRONIDAZOLE 500 MG/1
1500 TABLET, FILM COATED ORAL
Qty: 30 | Refills: 0 | Status: COMPLETED
Start: 2023-03-08 | End: 2023-08-14

## 2023-03-08 RX ORDER — CIPROFLOXACIN 500 MG/1
1000 TABLET, FILM COATED ORAL
Qty: 20 | Refills: 0 | Status: COMPLETED
Start: 2023-03-08 | End: 2023-08-14

## 2023-03-08 NOTE — SERVICEHPINOTES
LOKI ANDERSON  is a  73  female   who presents today for a  Colonoscopy   for   the indications listed below. The updated Patient Profile was reviewed prior to the procedure, in conjunction with the Physical Exam, including medical conditions, surgical procedures, medications, allergies, family history and social history. See Physical Exam time stamp below for date and time of HPI completion.Pre-operatively, I reviewed the indication(s) for the procedure, the risks of the procedure [including but not limited to: unexpected bleeding possibly requiring hospitalization and/or unplanned repeat procedures, perforation possibly requiring surgical treatment, missed lesions and complications of sedation/MAC (also explained by anesthesia staff)]. I have evaluated the patient for risks associated with the planned anesthesia and the procedure to be performed and find the patient an acceptable candidate for IV sedation.Multiple opportunities were provided for any questions or concerns, and all questions were answered satisfactorily before any anesthesia was administered. We will proceed with the planned procedure.br

## 2023-03-09 ENCOUNTER — TRANSCRIBE ORDERS (OUTPATIENT)
Dept: ADMINISTRATIVE | Facility: HOSPITAL | Age: 73
End: 2023-03-09
Payer: MEDICARE

## 2023-03-09 ENCOUNTER — HOSPITAL ENCOUNTER (OUTPATIENT)
Dept: CARDIOLOGY | Facility: HOSPITAL | Age: 73
Discharge: HOME | End: 2023-03-09
Payer: MEDICARE

## 2023-03-09 ENCOUNTER — LAB (OUTPATIENT)
Dept: LAB | Facility: HOSPITAL | Age: 73
End: 2023-03-09
Payer: MEDICARE

## 2023-03-09 ENCOUNTER — TREATMENT (OUTPATIENT)
Dept: PHYSICAL THERAPY | Facility: CLINIC | Age: 73
End: 2023-03-09
Payer: MEDICARE

## 2023-03-09 DIAGNOSIS — R26.89 BALANCE PROBLEM: Primary | ICD-10-CM

## 2023-03-09 DIAGNOSIS — Z78.9 IMPAIRED MOBILITY AND ADLS: ICD-10-CM

## 2023-03-09 DIAGNOSIS — Z01.818 PRE-OP TESTING: ICD-10-CM

## 2023-03-09 DIAGNOSIS — Z01.818 PRE-OP TESTING: Primary | ICD-10-CM

## 2023-03-09 DIAGNOSIS — Z74.09 IMPAIRED MOBILITY AND ADLS: ICD-10-CM

## 2023-03-09 LAB
ANION GAP SERPL CALCULATED.3IONS-SCNC: 13.6 MMOL/L (ref 5–15)
BUN SERPL-MCNC: 14 MG/DL (ref 8–23)
BUN/CREAT SERPL: 15.1 (ref 7–25)
CALCIUM SPEC-SCNC: 10.5 MG/DL (ref 8.6–10.5)
CHLORIDE SERPL-SCNC: 105 MMOL/L (ref 98–107)
CO2 SERPL-SCNC: 26.4 MMOL/L (ref 22–29)
CREAT SERPL-MCNC: 0.93 MG/DL (ref 0.57–1)
EGFRCR SERPLBLD CKD-EPI 2021: 65.8 ML/MIN/1.73
GLUCOSE SERPL-MCNC: 104 MG/DL (ref 65–99)
POTASSIUM SERPL-SCNC: 3.9 MMOL/L (ref 3.5–5.2)
SODIUM SERPL-SCNC: 145 MMOL/L (ref 136–145)

## 2023-03-09 PROCEDURE — 93005 ELECTROCARDIOGRAM TRACING: CPT | Performed by: OPHTHALMOLOGY

## 2023-03-09 PROCEDURE — 97112 NEUROMUSCULAR REEDUCATION: CPT | Performed by: PHYSICAL THERAPIST

## 2023-03-09 PROCEDURE — 80048 BASIC METABOLIC PNL TOTAL CA: CPT

## 2023-03-09 PROCEDURE — 93010 ELECTROCARDIOGRAM REPORT: CPT | Performed by: INTERNAL MEDICINE

## 2023-03-09 PROCEDURE — 36415 COLL VENOUS BLD VENIPUNCTURE: CPT

## 2023-03-09 NOTE — PROGRESS NOTES
Physical Therapy Daily Treatment Note    Patient: Meliza Red   : 1950  Referring practitioner: Joseph F Seipel, MD  Diagnoses: Balance problem [R26.89]  Today's Date: 3/9/2023    VISIT#: 5    Subjective Evaluation    History of Present Illness  Mechanism of injury: Pt reports she has been ok since last visit.  She was not sore from the exercises. She does not have any questions regarding HEP    Pain  Current pain ratin         Objective     See Exercise, Manual, and Modality Logs for complete treatment.     Cont with NMR today and pt performed well  Will need to upgrade next visit    Assessment & Plan     Assessment    Assessment details: Pt nicko well today with improved performance overall  Will need to upgrade ex program at next visit      Progress per Plan of Care        Timed:         Manual Therapy:         mins  11986;     Therapeutic Exercise:         mins  59605;     Neuromuscular Kathleen: 30       mins  43010;    Therapeutic Activity:          mins  25018;     Gait Training:           mins  15470;     Ultrasound:          mins  99016;    Ionto                                   mins   16482  Self Care                            mins   58123  Canalith Repos                   mins  99579    Un-Timed:  Electrical Stimulation:         mins  48765 ( );  Traction          mins 82543  Low Eval          Mins  66637  Mod Eval          Mins  72891  High Eval                            Mins  95801  Re-Eval                               mins  60710    Timed Treatment:30      mins   Total Treatment:   30     mins    Shari Lewis PT  Physical Therapist  [place] PT Lic. # [id number]

## 2023-03-13 ENCOUNTER — TREATMENT (OUTPATIENT)
Dept: PHYSICAL THERAPY | Facility: CLINIC | Age: 73
End: 2023-03-13
Payer: MEDICARE

## 2023-03-13 DIAGNOSIS — Z74.09 IMPAIRED MOBILITY AND ADLS: ICD-10-CM

## 2023-03-13 DIAGNOSIS — Z78.9 IMPAIRED MOBILITY AND ADLS: ICD-10-CM

## 2023-03-13 DIAGNOSIS — R26.89 BALANCE PROBLEM: Primary | ICD-10-CM

## 2023-03-13 LAB — QT INTERVAL: 357 MS

## 2023-03-13 PROCEDURE — 97112 NEUROMUSCULAR REEDUCATION: CPT | Performed by: PHYSICAL THERAPIST

## 2023-03-13 PROCEDURE — 97110 THERAPEUTIC EXERCISES: CPT | Performed by: PHYSICAL THERAPIST

## 2023-03-13 NOTE — PROGRESS NOTES
Physical Therapy Daily Treatment Note    Patient: Meliza Red   : 1950  Referring practitioner: Joseph F Seipel, MD  Diagnoses: Balance problem [R26.89]  Today's Date: 3/13/2023    VISIT#: 6    Subjective Evaluation    History of Present Illness  Mechanism of injury: Pt reports she had ankle and waist soreness yesterday from the exercises and she did not do them yesterday and she feels better today.  She has started some new meds for her colon.      Pain  Current pain ratin         Objective     See Exercise, Manual, and Modality Logs for complete treatment.     Cont with ex, NMR as noted    Patient Education:    Assessment & Plan     Assessment    Assessment details: Tolerated well today despite ankle pain yesterday      Progress per Plan of Care        Timed:         Manual Therapy:         mins  90001;     Therapeutic Exercise: 15        mins  06427;     Neuromuscular Kathleen: 15       mins  74054;    Therapeutic Activity:          mins  43428;     Gait Training:           mins  94737;     Ultrasound:          mins  49900;    Ionto                                   mins   82981  Self Care                            mins   95000  Canalith Repos                   mins  79370    Un-Timed:  Electrical Stimulation:         mins  10605 ( );  Traction          mins 36793  Low Eval          Mins  27898  Mod Eval          Mins  84526  High Eval                            Mins  06272  Re-Eval                               mins  95251    Timed Treatment: 30     mins   Total Treatment:   30     mins    Shari Lewis PT  Physical Therapist  [place] PT Lic. # [id number]

## 2023-03-16 ENCOUNTER — TREATMENT (OUTPATIENT)
Dept: PHYSICAL THERAPY | Facility: CLINIC | Age: 73
End: 2023-03-16
Payer: MEDICARE

## 2023-03-16 DIAGNOSIS — R26.89 BALANCE PROBLEM: Primary | ICD-10-CM

## 2023-03-16 DIAGNOSIS — Z74.09 IMPAIRED MOBILITY AND ADLS: ICD-10-CM

## 2023-03-16 DIAGNOSIS — Z78.9 IMPAIRED MOBILITY AND ADLS: ICD-10-CM

## 2023-03-16 PROCEDURE — 97110 THERAPEUTIC EXERCISES: CPT | Performed by: PHYSICAL THERAPIST

## 2023-03-16 PROCEDURE — 97112 NEUROMUSCULAR REEDUCATION: CPT | Performed by: PHYSICAL THERAPIST

## 2023-03-16 NOTE — PROGRESS NOTES
Physical Therapy Daily Treatment Note    Patient: Meliza Red   : 1950  Referring practitioner: Joseph F Seipel, MD  Diagnoses: Balance problem [R26.89]  Today's Date: 3/16/2023    VISIT#: 7    Subjective Evaluation    History of Present Illness  Mechanism of injury: Pt reports she has been doing the ex at home.  She  =       Objective     See Exercise, Manual, and Modality Logs for complete treatment.     Cont with ex, NMR as noted;  Progressed today with program and added to HEP      Assessment & Plan     Assessment    Assessment details: nicko well today with new ex and additions to HEP      Progress per Plan of Care        Timed:         Manual Therapy:         mins  84250;     Therapeutic Exercise:  10       mins  43774;     Neuromuscular Kathleen: 30       mins  11320;    Therapeutic Activity:          mins  68248;     Gait Training:           mins  25734;     Ultrasound:          mins  45730;    Ionto                                   mins   71719  Self Care                            mins   43919  Canalith Repos                   mins  62370    Un-Timed:  Electrical Stimulation:         mins  94855 ( );  Traction          mins 98211  Low Eval          Mins  04841  Mod Eval          Mins  48040  High Eval                            Mins  42516  Re-Eval                               mins  77173    Timed Treatment: 40     mins   Total Treatment:   40     mins    Shari Lewis PT  Physical Therapist  [place] PT Lic. # [id number]

## 2023-03-20 ENCOUNTER — TREATMENT (OUTPATIENT)
Dept: PHYSICAL THERAPY | Facility: CLINIC | Age: 73
End: 2023-03-20
Payer: MEDICARE

## 2023-03-20 DIAGNOSIS — Z74.09 IMPAIRED MOBILITY AND ADLS: ICD-10-CM

## 2023-03-20 DIAGNOSIS — Z78.9 IMPAIRED MOBILITY AND ADLS: ICD-10-CM

## 2023-03-20 DIAGNOSIS — R26.89 BALANCE PROBLEM: Primary | ICD-10-CM

## 2023-03-20 PROCEDURE — 97112 NEUROMUSCULAR REEDUCATION: CPT | Performed by: PHYSICAL THERAPIST

## 2023-03-20 NOTE — PROGRESS NOTES
Physical Therapy Daily Treatment Note    Patient: Meliza Red   : 1950  Referring practitioner: Joseph F Seipel, MD  Diagnoses: Balance problem [R26.89]  Today's Date: 3/20/2023    VISIT#: 8    Subjective Evaluation    History of Present Illness  Mechanism of injury: Pt reports she has been working on all of her exercises at home.     Pain  Current pain ratin         Objective     See Exercise, Manual, and Modality Logs for complete treatment.     Cont with NMR with addition of dynamic balance ex and added them to HEP as noted      Assessment & Plan     Assessment    Assessment details: nicko well today with new balance exercises      Progress per Plan of Care        Timed:         Manual Therapy:         mins  29723;     Therapeutic Exercise:         mins  20132;     Neuromuscular Kathleen: 34       mins  50300;    Therapeutic Activity:          mins  45503;     Gait Training:           mins  91761;     Ultrasound:          mins  38489;    Ionto                                   mins   59071  Self Care                            mins   11283  Canalith Repos                   mins  86695    Un-Timed:  Electrical Stimulation:         mins  05084 ( );  Traction          mins 97444  Low Eval          Mins  77291  Mod Eval          Mins  50890  High Eval                            Mins  57695  Re-Eval                               mins  07138    Timed Treatment:34      mins   Total Treatment:  34      mins    Shari Lewis PT  Physical Therapist  [place] PT Lic. # [id number]

## 2023-03-23 ENCOUNTER — TREATMENT (OUTPATIENT)
Dept: PHYSICAL THERAPY | Facility: CLINIC | Age: 73
End: 2023-03-23
Payer: MEDICARE

## 2023-03-23 DIAGNOSIS — Z78.9 IMPAIRED MOBILITY AND ADLS: ICD-10-CM

## 2023-03-23 DIAGNOSIS — R26.89 BALANCE PROBLEM: Primary | ICD-10-CM

## 2023-03-23 DIAGNOSIS — Z74.09 IMPAIRED MOBILITY AND ADLS: ICD-10-CM

## 2023-03-23 PROCEDURE — 97112 NEUROMUSCULAR REEDUCATION: CPT | Performed by: PHYSICAL THERAPIST

## 2023-03-23 PROCEDURE — 97110 THERAPEUTIC EXERCISES: CPT | Performed by: PHYSICAL THERAPIST

## 2023-03-23 NOTE — PROGRESS NOTES
Physical Therapy Progress Note/MC Note  [address] [facility] [address], [address] [place]  [address]    Patient: Meliza Red   : 1950  Referring practitioner: Joseph F Seipel, MD  Diagnoses: Balance problem [R26.89]  Today's Date: 3/23/2023    VISIT#: 9    Subjective Evaluation    History of Present Illness  Mechanism of injury: ABC:  74  Pt had eye surgery on Tuesday (2 days ago) and had to wear a patch until yesterday.  She had cataract surgery awhile ago and has always had a smudge in her vision.  The surgery was supposed to take care of it.  She  Cannot tell yet if it did or not.  She goes back for a re-check in 2 weeks.  She feels like balance and strength are improving with the exercises.    Pain  Current pain ratin         Objective       LE strength:  Right:  Hip flex 4-; knee ext/flex and ankle DF 4; hip abd and ext 4-                                   Left: Hip flex 4 (improved); knee ext/flex and ankle DF 4; hip abd and ext  4                           (improved)                                             BPPV Assessment:  Deferred                           Roll Test:  Right:                                            Left:                          [place] Hallpike:  Right:                                                Left:                 MCTSIB:  cond 1:  30 sec no inc sway  `                           cond 2:  30 sec  Min inc sway  (improved)                              cond 3:  30 sec with min inc sway                              cond 4:  10 sec with LOB post  (improved)       See Exercise, Manual, and Modality Logs for complete treatment.     Cont with there ex with upgrades as noted;  Cont with NMR with upgrades as noted    Patient Education:  Cont with current HEP adding in the progressions as discussed today    Assessment & Plan     Assessment    Assessment details: Pt has met 3/5 STGs and 0/4 LTGs at this time.  Pt continues to have issues with strength, static balance, dynamic balance and  functional activities.  Pt would benefit from continued skilled PT to address impairments, dec pain, work toward meeting remaining goals and maximize function.       STGs:  6 weeks  1.  Pt to tolerate initial HEP/post-treatment regimen.  MET  2.  Pt to tolerate advancement of HEP as indicated.  MET  3.  Pt to report at least 25% improvement in off balance symptoms.  MET  4.  Resolve BPPV as indicated by negative bilateral Roll Tests and [place] Hallpike.   5.  Pt to improve MCTSIB cond 2 to 30 sec with no LOB  PROGRESSING    LTGs:  By DC  1.  Pt to be (I) with HEP to manage own condition.  2.  Pt to report at least 50% improvement in balance issues symptoms.  3.  Pt to improve function as indicated by improved score on ABC as compared to eval.  4.  Pt to improve balance as indicated by improved performance on DGI      Progress per Plan of Care        Timed:         Manual Therapy:         mins  18450;     Therapeutic Exercise:  30       mins  65622;     Neuromuscular Kathleen: 30       mins  94125;    Therapeutic Activity:          mins  12463;     Gait Training:           mins  40936;     Ultrasound:          mins  80720;    Ionto                                   mins   26095  Self Care                            mins   72217  Canalith Repos                   mins  22229    Un-Timed:  Electrical Stimulation:         mins  21461 ( );  Traction          mins 48224  Low Eval          Mins  48286  Mod Eval          Mins  12051  High Eval                            Mins  35163  Re-Eval                               mins  60404    Timed Treatment: 60     mins   Total Treatment:   60     mins    Shari Lewis PT  Physical Therapist  [place] PT Lic. # [id number]

## 2023-03-30 ENCOUNTER — TREATMENT (OUTPATIENT)
Dept: PHYSICAL THERAPY | Facility: CLINIC | Age: 73
End: 2023-03-30
Payer: MEDICARE

## 2023-03-30 DIAGNOSIS — Z78.9 IMPAIRED MOBILITY AND ADLS: ICD-10-CM

## 2023-03-30 DIAGNOSIS — R26.89 BALANCE PROBLEM: Primary | ICD-10-CM

## 2023-03-30 DIAGNOSIS — Z74.09 IMPAIRED MOBILITY AND ADLS: ICD-10-CM

## 2023-03-30 PROCEDURE — 97110 THERAPEUTIC EXERCISES: CPT | Performed by: PHYSICAL THERAPIST

## 2023-03-30 PROCEDURE — 97112 NEUROMUSCULAR REEDUCATION: CPT | Performed by: PHYSICAL THERAPIST

## 2023-03-30 NOTE — PROGRESS NOTES
Physical Therapy Daily Treatment Note    Patient: Meliza Red   : 1950  Referring practitioner: Joseph F Seipel, MD  Diagnoses: Balance problem [R26.89]  Today's Date: 3/30/2023    VISIT#: 10    Subjective Evaluation    History of Present Illness  Mechanism of injury: Pt report she has been getting some sore from the newer exercises.      Pain  Current pain ratin         Objective     See Exercise, Manual, and Modality Logs for complete treatment.     Cont with ex, NMR as noted; pt performed well today with all ex       Assessment & Plan     Assessment    Assessment details: nicko well today with all ex      Progress per Plan of Care        Timed:         Manual Therapy:         mins  64367;     Therapeutic Exercise:   10      mins  55378;     Neuromuscular Kathleen: 22       mins  18061;    Therapeutic Activity:          mins  08046;     Gait Training:           mins  63184;     Ultrasound:          mins  97813;    Ionto                                   mins   49880  Self Care                            mins   45121  Canalith Repos                   mins  42820    Un-Timed:  Electrical Stimulation:         mins  06365 ( );  Traction          mins 66798  Low Eval          Mins  31399  Mod Eval          Mins  17416  High Eval                            Mins  15893  Re-Eval                               mins  54356    Timed Treatment: 32     mins   Total Treatment:   32     mins    Shari Lewis PT  Physical Therapist  [place] PT Lic. # [id number]

## 2023-04-03 ENCOUNTER — TREATMENT (OUTPATIENT)
Dept: PHYSICAL THERAPY | Facility: CLINIC | Age: 73
End: 2023-04-03
Payer: MEDICARE

## 2023-04-03 DIAGNOSIS — R26.89 BALANCE PROBLEM: Primary | ICD-10-CM

## 2023-04-03 DIAGNOSIS — Z78.9 IMPAIRED MOBILITY AND ADLS: ICD-10-CM

## 2023-04-03 DIAGNOSIS — Z74.09 IMPAIRED MOBILITY AND ADLS: ICD-10-CM

## 2023-04-03 PROCEDURE — 97110 THERAPEUTIC EXERCISES: CPT | Performed by: PHYSICAL THERAPIST

## 2023-04-03 PROCEDURE — 97112 NEUROMUSCULAR REEDUCATION: CPT | Performed by: PHYSICAL THERAPIST

## 2023-04-03 NOTE — PROGRESS NOTES
Physical Therapy Daily Treatment Note    Patient: Meliza Red   : 1950  Referring practitioner: Joseph F Seipel, MD  Diagnoses: Balance problem [R26.89]  Today's Date: 4/3/2023    VISIT#: 11    Subjective Evaluation    History of Present Illness  Mechanism of injury: Pt reports she has been doing the HEP as instructed and it takes a long time to do them all.     Pain  Current pain ratin         Objective     See Exercise, Manual, and Modality Logs for complete treatment.     Cont with ex, NMR as noted       Assessment & Plan     Assessment    Assessment details: nicko well today with more difficult ex introduced       Progress per Plan of Care        Timed:         Manual Therapy:         mins  08429;     Therapeutic Exercise:  10       mins  09969;     Neuromuscular Kathleen: 20       mins  14241;    Therapeutic Activity:          mins  48622;     Gait Training:           mins  75773;     Ultrasound:          mins  80752;    Ionto                                   mins   59897  Self Care                            mins   42848  Canalith Repos                   mins  64574    Un-Timed:  Electrical Stimulation:         mins  52054 ( );  Traction          mins 58973  Low Eval          Mins  63972  Mod Eval          Mins  19002  High Eval                            Mins  87006  Re-Eval                               mins  85362    Timed Treatment: 30     mins   Total Treatment:   30     mins    Shari Lewis PT  Physical Therapist  [place] PT Lic. # [id number]

## 2023-04-05 ENCOUNTER — OFFICE VISIT (OUTPATIENT)
Dept: FAMILY MEDICINE CLINIC | Facility: CLINIC | Age: 73
End: 2023-04-05
Payer: MEDICARE

## 2023-04-05 ENCOUNTER — LAB (OUTPATIENT)
Dept: FAMILY MEDICINE CLINIC | Facility: CLINIC | Age: 73
End: 2023-04-05
Payer: MEDICARE

## 2023-04-05 VITALS
HEART RATE: 77 BPM | OXYGEN SATURATION: 96 % | WEIGHT: 170 LBS | HEIGHT: 65 IN | BODY MASS INDEX: 28.32 KG/M2 | DIASTOLIC BLOOD PRESSURE: 76 MMHG | SYSTOLIC BLOOD PRESSURE: 130 MMHG

## 2023-04-05 DIAGNOSIS — Z78.0 POSTMENOPAUSAL STATUS: ICD-10-CM

## 2023-04-05 DIAGNOSIS — R91.1 PULMONARY NODULE: ICD-10-CM

## 2023-04-05 DIAGNOSIS — E78.2 MIXED HYPERLIPIDEMIA: ICD-10-CM

## 2023-04-05 DIAGNOSIS — I10 ESSENTIAL HYPERTENSION: ICD-10-CM

## 2023-04-05 DIAGNOSIS — Z87.891 PERSONAL HISTORY OF NICOTINE DEPENDENCE: ICD-10-CM

## 2023-04-05 DIAGNOSIS — I10 ESSENTIAL HYPERTENSION: Primary | ICD-10-CM

## 2023-04-05 DIAGNOSIS — F41.9 ANXIETY: ICD-10-CM

## 2023-04-05 DIAGNOSIS — Z12.31 OTHER SCREENING MAMMOGRAM: ICD-10-CM

## 2023-04-05 LAB
ALBUMIN SERPL-MCNC: 4.2 G/DL (ref 3.5–5.2)
ALBUMIN/GLOB SERPL: 1.5 G/DL
ALP SERPL-CCNC: 84 U/L (ref 39–117)
ALT SERPL W P-5'-P-CCNC: 30 U/L (ref 1–33)
ANION GAP SERPL CALCULATED.3IONS-SCNC: 9.9 MMOL/L (ref 5–15)
AST SERPL-CCNC: 27 U/L (ref 1–32)
BILIRUB SERPL-MCNC: 0.3 MG/DL (ref 0–1.2)
BUN SERPL-MCNC: 13 MG/DL (ref 8–23)
BUN/CREAT SERPL: 18.3 (ref 7–25)
CALCIUM SPEC-SCNC: 9.7 MG/DL (ref 8.6–10.5)
CHLORIDE SERPL-SCNC: 101 MMOL/L (ref 98–107)
CHOLEST SERPL-MCNC: 202 MG/DL (ref 0–200)
CO2 SERPL-SCNC: 29.1 MMOL/L (ref 22–29)
CREAT SERPL-MCNC: 0.71 MG/DL (ref 0.57–1)
EGFRCR SERPLBLD CKD-EPI 2021: 91 ML/MIN/1.73
GLOBULIN UR ELPH-MCNC: 2.8 GM/DL
GLUCOSE SERPL-MCNC: 109 MG/DL (ref 65–99)
HDLC SERPL-MCNC: 75 MG/DL (ref 40–60)
LDLC SERPL CALC-MCNC: 104 MG/DL (ref 0–100)
LDLC/HDLC SERPL: 1.33 {RATIO}
POTASSIUM SERPL-SCNC: 4 MMOL/L (ref 3.5–5.2)
PROT SERPL-MCNC: 7 G/DL (ref 6–8.5)
SODIUM SERPL-SCNC: 140 MMOL/L (ref 136–145)
TRIGL SERPL-MCNC: 136 MG/DL (ref 0–150)
VLDLC SERPL-MCNC: 23 MG/DL (ref 5–40)

## 2023-04-05 PROCEDURE — 80053 COMPREHEN METABOLIC PANEL: CPT | Performed by: NURSE PRACTITIONER

## 2023-04-05 PROCEDURE — 80061 LIPID PANEL: CPT | Performed by: NURSE PRACTITIONER

## 2023-04-05 PROCEDURE — 1160F RVW MEDS BY RX/DR IN RCRD: CPT | Performed by: NURSE PRACTITIONER

## 2023-04-05 PROCEDURE — 36415 COLL VENOUS BLD VENIPUNCTURE: CPT

## 2023-04-05 PROCEDURE — 99214 OFFICE O/P EST MOD 30 MIN: CPT | Performed by: NURSE PRACTITIONER

## 2023-04-05 PROCEDURE — 3078F DIAST BP <80 MM HG: CPT | Performed by: NURSE PRACTITIONER

## 2023-04-05 PROCEDURE — 1159F MED LIST DOCD IN RCRD: CPT | Performed by: NURSE PRACTITIONER

## 2023-04-05 PROCEDURE — 3075F SYST BP GE 130 - 139MM HG: CPT | Performed by: NURSE PRACTITIONER

## 2023-04-05 RX ORDER — ESCITALOPRAM OXALATE 10 MG/1
10 TABLET ORAL DAILY
Qty: 90 TABLET | Refills: 3 | Status: SHIPPED | OUTPATIENT
Start: 2023-04-05

## 2023-04-05 RX ORDER — DIFLUPREDNATE OPHTHALMIC 0.5 MG/ML
1 EMULSION OPHTHALMIC EVERY 6 HOURS
COMMUNITY
Start: 2023-04-05

## 2023-04-05 RX ORDER — APREMILAST 10-20-30MG
KIT ORAL
COMMUNITY
Start: 2023-01-09

## 2023-04-05 RX ORDER — BROMFENAC SODIUM 0.7 MG/ML
1 SOLUTION/ DROPS OPHTHALMIC EVERY 12 HOURS
COMMUNITY
Start: 2023-04-05

## 2023-04-05 RX ORDER — OFLOXACIN 3 MG/ML
SOLUTION/ DROPS OPHTHALMIC
COMMUNITY
Start: 2023-03-01

## 2023-04-05 NOTE — PROGRESS NOTES
Subjective   Meliza Red is a 73 y.o. female.       HPI   Pt is here today for 6 month follow up.   1) HTN - currently on amlodipine 10 mg daily; carvedilol 50 mg bid; lisinopril-hctz 20-12.5 mg two tabs daily.  BP stable.  She has home readings ranging from 120-150's/60-80's.  Mikki any CP; palpitations; SOA: dizziness; headache; trouble with vision.    2) Hyperlipidemia - currently on atorvastatin 20 mg daily.   3) Anxiety - currently on escitalopram 10 mg daily.  This was started by her neurologist when she mentioned to him she had been having increased anxiety/panic attacks at night.  Since starting the medication her symptoms have improved.  She would like for this office to take over the prescription.    4) Due for CT Chest follow up for pulmonary nodules that are being monitored.  Former smoker; quit 10 yrs ago.    5) Due mammogram and DEXA scan.     The following portions of the patient's history were reviewed and updated as appropriate: allergies, current medications, past family history, past medical history, past social history, past surgical history and problem list.    Review of Systems   Constitutional: Negative for chills, fatigue and fever.   HENT: Negative for ear pain, rhinorrhea, sore throat and swollen glands.    Eyes: Negative for visual disturbance.   Respiratory: Positive for shortness of breath. Negative for cough and wheezing.    Cardiovascular: Negative for chest pain, palpitations and leg swelling.   Gastrointestinal: Negative for abdominal pain, blood in stool, constipation, diarrhea, nausea, vomiting and GERD.   Genitourinary: Negative for dysuria, frequency, hematuria and urgency.   Musculoskeletal: Negative for neck pain.   Skin: Negative for rash.   Neurological: Negative for dizziness, weakness, light-headedness and headache.   Psychiatric/Behavioral: Negative for depressed mood. The patient is not nervous/anxious.        Objective   Physical Exam  Vitals reviewed.    Constitutional:       General: She is not in acute distress.     Appearance: Normal appearance.   Cardiovascular:      Rate and Rhythm: Normal rate and regular rhythm.      Pulses: Normal pulses.      Heart sounds: Normal heart sounds. No murmur heard.  Pulmonary:      Effort: Pulmonary effort is normal. No respiratory distress.      Breath sounds: Normal breath sounds. No wheezing or rhonchi.   Chest:      Chest wall: No tenderness.   Abdominal:      Tenderness: There is no right CVA tenderness or left CVA tenderness.   Musculoskeletal:      Cervical back: Normal range of motion and neck supple. No tenderness.   Skin:     General: Skin is warm and dry.      Findings: No erythema.   Neurological:      General: No focal deficit present.      Mental Status: She is alert and oriented to person, place, and time.   Psychiatric:         Mood and Affect: Mood normal.           Assessment & Plan   Diagnoses and all orders for this visit:    1. Essential hypertension (Primary)  Comments:  Stable.   Cont. current medication.   Labs ordered.   RTO in 6 mo.   Orders:  -     Comprehensive metabolic panel; Future  -     Lipid panel; Future    2. Mixed hyperlipidemia  Comments:  Stable.   Cont. current medication.   Labs ordered.   RTO in 6 mo.   Orders:  -     Lipid panel; Future    3. Anxiety  Comments:  Stable.   Cont. escitalopram.   Refills sent.   RTO in 6 mo.   Orders:  -     escitalopram (Lexapro) 10 MG tablet; Take 1 tablet by mouth Daily.  Dispense: 90 tablet; Refill: 3    4. Pulmonary nodule  Comments:  Follow up Chest CT ordered.   Orders:  -     CT Chest Low Dose Wo; Future    5. Other screening mammogram  Comments:  Order given for screening mammogram.   Orders:  -     Mammo Screening Digital Tomosynthesis Bilateral With CAD; Future    6. Postmenopausal status  Comments:  Order given for screening DEXA scan.   Orders:  -     DEXA Bone Density Axial    7. Personal history of nicotine dependence  Comments:  Follow up  Chest CT ordered.   Orders:  -     CT Chest Low Dose Wo; Future

## 2023-04-06 ENCOUNTER — TREATMENT (OUTPATIENT)
Dept: PHYSICAL THERAPY | Facility: CLINIC | Age: 73
End: 2023-04-06
Payer: MEDICARE

## 2023-04-06 DIAGNOSIS — R26.89 BALANCE PROBLEM: Primary | ICD-10-CM

## 2023-04-06 DIAGNOSIS — Z74.09 IMPAIRED MOBILITY AND ADLS: ICD-10-CM

## 2023-04-06 DIAGNOSIS — Z78.9 IMPAIRED MOBILITY AND ADLS: ICD-10-CM

## 2023-04-06 PROCEDURE — 97110 THERAPEUTIC EXERCISES: CPT | Performed by: PHYSICAL THERAPIST

## 2023-04-06 PROCEDURE — 97112 NEUROMUSCULAR REEDUCATION: CPT | Performed by: PHYSICAL THERAPIST

## 2023-04-06 NOTE — PROGRESS NOTES
Physical Therapy Daily Treatment Note    Patient: Meliza Red   : 1950  Referring practitioner: Joseph F Seipel, MD  Diagnoses: Balance problem [R26.89]  Today's Date: 2023    VISIT#: 12    Subjective Evaluation    History of Present Illness  Mechanism of injury: Pt reports she has been doing the exercises and she can still tell she is improved.    Pain  Current pain ratin         Objective     See Exercise, Manual, and Modality Logs for complete treatment.     Cont with ex, NMR today    Reviewed and practiced all strengthening exercises today and progressed as able     inst her not to go above 3 sets of 10 on strengthening exercises      Assessment & Plan     Assessment    Assessment details: nicko well with all aspects of treatment today; progressing well      Progress per Plan of Care        Timed:         Manual Therapy:         mins  96741;     Therapeutic Exercise:  20       mins  55752;     Neuromuscular Kathleen:10        mins  73157;    Therapeutic Activity:          mins  50242;     Gait Training:           mins  63783;     Ultrasound:          mins  40847;    Ionto                                   mins   63018  Self Care                            mins   46347  Canalith Repos                   mins  45792    Un-Timed:  Electrical Stimulation:         mins  87803 ( );  Traction          mins 62802  Low Eval          Mins  45730  Mod Eval          Mins  28845  High Eval                            Mins  58884  Re-Eval                               mins  58682    Timed Treatment: 30     mins   Total Treatment:   30     mins    Shari Lewis PT  Physical Therapist  [place] PT Lic. # [id number]

## 2023-04-10 ENCOUNTER — TREATMENT (OUTPATIENT)
Dept: PHYSICAL THERAPY | Facility: CLINIC | Age: 73
End: 2023-04-10
Payer: MEDICARE

## 2023-04-10 DIAGNOSIS — Z78.9 IMPAIRED MOBILITY AND ADLS: ICD-10-CM

## 2023-04-10 DIAGNOSIS — R26.89 BALANCE PROBLEM: Primary | ICD-10-CM

## 2023-04-10 DIAGNOSIS — Z74.09 IMPAIRED MOBILITY AND ADLS: ICD-10-CM

## 2023-04-10 PROCEDURE — 97112 NEUROMUSCULAR REEDUCATION: CPT | Performed by: PHYSICAL THERAPIST

## 2023-04-10 NOTE — PROGRESS NOTES
Physical Therapy Daily Treatment Note    Patient: Meliza Red   : 1950  Referring practitioner: Joseph F Seipel, MD  Diagnoses: Balance problem [R26.89]  Today's Date: 4/10/2023    VISIT#: 13    Subjective Evaluation    History of Present Illness  Mechanism of injury: Pt reports she has been doing her ex at home without issues.  She hit the curb on the way in and flattened her tire; she called her  and he is on his way.    Pain  Current pain ratin         Objective     See Exercise, Manual, and Modality Logs for complete treatment.     Cont with NMR today and performed well    Patient Education:  Cont  With HEP    Assessment & Plan     Assessment    Assessment details: nicko well today but was some distracted about the tire        Progress per Plan of Care        Timed:         Manual Therapy:         mins  13996;     Therapeutic Exercise:         mins  98689;     Neuromuscular Kathleen: 28       mins  17462;    Therapeutic Activity:          mins  49414;     Gait Training:           mins  27666;     Ultrasound:          mins  54498;    Ionto                                   mins   67427  Self Care                            mins   20756  Canalith Repos                   mins  27515    Un-Timed:  Electrical Stimulation:         mins  17566 ( );  Traction          mins 82541  Low Eval          Mins  02607  Mod Eval          Mins  67426  High Eval                            Mins  99295  Re-Eval                               mins  28962    Timed Treatment:28      mins   Total Treatment:   28     mins    Shari Lewis PT  Physical Therapist  [place] PT Lic. # [id number]

## 2023-04-13 ENCOUNTER — TREATMENT (OUTPATIENT)
Dept: PHYSICAL THERAPY | Facility: CLINIC | Age: 73
End: 2023-04-13
Payer: MEDICARE

## 2023-04-13 DIAGNOSIS — R26.89 BALANCE PROBLEM: Primary | ICD-10-CM

## 2023-04-13 DIAGNOSIS — Z74.09 IMPAIRED MOBILITY AND ADLS: ICD-10-CM

## 2023-04-13 DIAGNOSIS — Z78.9 IMPAIRED MOBILITY AND ADLS: ICD-10-CM

## 2023-04-13 PROCEDURE — 97530 THERAPEUTIC ACTIVITIES: CPT | Performed by: PHYSICAL THERAPIST

## 2023-04-13 PROCEDURE — 97112 NEUROMUSCULAR REEDUCATION: CPT | Performed by: PHYSICAL THERAPIST

## 2023-04-13 NOTE — PROGRESS NOTES
Physical Therapy Daily Treatment Note    Patient: Meliza Red   : 1950  Referring practitioner: Joseph F Seipel, MD  Diagnoses: Balance problem [R26.89]  Today's Date: 2023    VISIT#: 14    Subjective Evaluation    History of Present Illness  Mechanism of injury: Pt reports she has been feeling ok except for yesterday when she realized she had forgotten to take her meds.      Pain  Current pain ratin         Objective     See Exercise, Manual, and Modality Logs for complete treatment.     Cont with NMR and act as noted    Pt will work on walking with marching at home      Assessment & Plan     Assessment    Assessment details: nicko well today with several new act for dynamic balance      Progress per Plan of Care        Timed:         Manual Therapy:         mins  96552;     Therapeutic Exercise:         mins  32957;     Neuromuscular Kathleen: 20       mins  86490;    Therapeutic Activity:   10       mins  95228;     Gait Training:           mins  75621;     Ultrasound:          mins  94055;    Ionto                                   mins   69494  Self Care                            mins   86425  Canalith Repos                   mins  72771    Un-Timed:  Electrical Stimulation:         mins  84658 ( );  Traction          mins 27341  Low Eval          Mins  05835  Mod Eval          Mins  82715  High Eval                            Mins  67546  Re-Eval                               mins  31642    Timed Treatment: 30     mins   Total Treatment:   30     mins    Shari Lewis PT  Physical Therapist  [place] PT Lic. # [id number]

## 2023-04-15 DIAGNOSIS — E78.2 MIXED HYPERLIPIDEMIA: ICD-10-CM

## 2023-04-16 RX ORDER — ATORVASTATIN CALCIUM 20 MG/1
TABLET, FILM COATED ORAL
Qty: 90 TABLET | Refills: 0 | Status: SHIPPED | OUTPATIENT
Start: 2023-04-16 | End: 2023-07-14

## 2023-04-19 ENCOUNTER — TREATMENT (OUTPATIENT)
Dept: PHYSICAL THERAPY | Facility: CLINIC | Age: 73
End: 2023-04-19
Payer: MEDICARE

## 2023-04-19 DIAGNOSIS — Z78.9 IMPAIRED MOBILITY AND ADLS: ICD-10-CM

## 2023-04-19 DIAGNOSIS — R26.89 BALANCE PROBLEM: Primary | ICD-10-CM

## 2023-04-19 DIAGNOSIS — Z74.09 IMPAIRED MOBILITY AND ADLS: ICD-10-CM

## 2023-04-19 NOTE — PROGRESS NOTES
Physical Therapy Daily Treatment Note    Patient: Meliza Red   : 1950  Referring practitioner: Joseph F Seipel, MD  Diagnoses: Balance problem [R26.89]  Today's Date: 2023    VISIT#: 15    Subjective Evaluation    History of Present Illness  Mechanism of injury: Everything at home is going well.      Pain  Current pain ratin         Objective     See Exercise, Manual, and Modality Logs for complete treatment.     Cont with ex, NMR as noted; progressed today with some ex/NMR      Assessment & Plan     Assessment    Assessment details: nicko well today with all ex and NMR as noted      Progress per Plan of Care        Timed:         Manual Therapy:        mins  42209;     Therapeutic Exercise:  15       mins  96533;     Neuromuscular Kathleen: 25       mins  20185;    Therapeutic Activity:          mins  76621;     Gait Training:           mins  57735;     Ultrasound:          mins  19899;    Ionto                                   mins   99402  Self Care                            mins   76654  Canalith Repos                   mins  23384    Un-Timed:  Electrical Stimulation:         mins  47605 ( );  Traction          mins 06799  Low Eval          Mins  73744  Mod Eval          Mins  26269  High Eval                            Mins  72475  Re-Eval                               mins  22871    Timed Treatment: 40     mins   Total Treatment:   40     mins    Shari Lewis, PT  Physical Therapist  [place] PT Lic. # [id number]

## 2023-04-20 ENCOUNTER — OFFICE VISIT (OUTPATIENT)
Dept: NEUROLOGY | Facility: CLINIC | Age: 73
End: 2023-04-20
Payer: MEDICARE

## 2023-04-20 VITALS
DIASTOLIC BLOOD PRESSURE: 71 MMHG | WEIGHT: 170 LBS | HEART RATE: 81 BPM | SYSTOLIC BLOOD PRESSURE: 154 MMHG | BODY MASS INDEX: 28.32 KG/M2 | HEIGHT: 65 IN

## 2023-04-20 DIAGNOSIS — G62.89 AXONAL SENSORIMOTOR NEUROPATHY: Primary | ICD-10-CM

## 2023-04-20 DIAGNOSIS — G25.81 RESTLESS LEGS: ICD-10-CM

## 2023-04-20 DIAGNOSIS — F41.9 ANXIETY: ICD-10-CM

## 2023-04-20 DIAGNOSIS — M05.80 POLYARTHRITIS WITH POSITIVE RHEUMATOID FACTOR: ICD-10-CM

## 2023-04-20 PROCEDURE — 1159F MED LIST DOCD IN RCRD: CPT | Performed by: PSYCHIATRY & NEUROLOGY

## 2023-04-20 PROCEDURE — 1160F RVW MEDS BY RX/DR IN RCRD: CPT | Performed by: PSYCHIATRY & NEUROLOGY

## 2023-04-20 PROCEDURE — 3077F SYST BP >= 140 MM HG: CPT | Performed by: PSYCHIATRY & NEUROLOGY

## 2023-04-20 PROCEDURE — 3078F DIAST BP <80 MM HG: CPT | Performed by: PSYCHIATRY & NEUROLOGY

## 2023-04-20 PROCEDURE — 99214 OFFICE O/P EST MOD 30 MIN: CPT | Performed by: PSYCHIATRY & NEUROLOGY

## 2023-04-20 NOTE — PROGRESS NOTES
Chief Complaint  Peripheral Neuropathy    Subjective          Meliza Red presents to [facility] for Neuropathy  History of Present Illness     Patient is here to f/u neuropathy  She states she is doing much better.  She also says her vertigo is better. Resolved    Physical Therapy is helping.    No pain in feet.     working on balance in PT   More trouble with eyes closed    Panic attacks resolved with the lexapro    Has rls occ but no medication needed          ====EMG BLE- WEAKNESS====  Impression:     This is an abnormal study.  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       ===PREV. OV 12/12/22 DR SEIPEL====    New patient referred by Dr. Fermin for vertigo patient states issue started Jan 2022, she states she had cataract surgery in february which helped her not see double vision,      she states vertigo has  been about the same but walking on uneven ground makes it worse and throws off her depth      she states she has seen and ENT specialist to check for crystal in her ears.     PT saw double, better with cataract surgery. But left lens is not right, which effects her balance     Sitting or lying down  Or change in position does not effect dizziness.   Last winter had spinning when changed position, resolve with holding still.      Currently doing better.  Walking on rough surface still a problem  No spinning sensation in a long time  No light headed sensation with standing  Has feeling of being out of balance     ENG testing was normal     No history of headache     Pt has some numbness and tingling in feet       Current Outpatient Medications:   •  amLODIPine (NORVASC) 10 MG tablet, Take 1 tablet by mouth Daily With Dinner., Disp: 90 tablet, Rfl: 1  •  Apremilast (Otezla) 10 & 20 & 30 MG tablet therapy pack, , Disp: , Rfl:   •  atorvastatin (LIPITOR) 20 MG tablet, TAKE ONE TABLET BY MOUTH ONCE NIGHTLY, Disp: 90 tablet, Rfl: 0  •  Bromfenac  "Sodium (Prolensa) 0.07 % solution, Apply 1 drop to eye(s) as directed by provider Every 12 (Twelve) Hours., Disp: , Rfl:   •  Calcium Carbonate (CALCIUM 600 PO), Take  by mouth., Disp: , Rfl:   •  carvedilol (COREG) 25 MG tablet, Take 2 tablets by mouth 2 (Two) Times a Day With Meals., Disp: 360 tablet, Rfl: 1  •  Cholecalciferol (VITAMIN D3) 125 MCG (5000 UT) tablet tablet, Take 1 tablet by mouth., Disp: , Rfl:   •  colestipol (COLESTID) 1 g tablet, , Disp: , Rfl:   •  difluprednate (DUREZOL) 0.05 % ophthalmic emulsion, Apply 1 drop to eye(s) as directed by provider Every 6 (Six) Hours., Disp: , Rfl:   •  escitalopram (Lexapro) 10 MG tablet, Take 1 tablet by mouth Daily., Disp: 90 tablet, Rfl: 3  •  folic acid (FOLVITE) 1 MG tablet, Take 1 tablet by mouth Daily., Disp: , Rfl:   •  KRILL OIL PO, Take  by mouth., Disp: , Rfl:   •  lisinopril-hydrochlorothiazide (PRINZIDE,ZESTORETIC) 20-12.5 MG per tablet, Take 2 tablets by mouth Daily With Breakfast., Disp: 180 tablet, Rfl: 1  •  Milk Thistle 175 MG capsule, , Disp: , Rfl:   •  multivitamin with minerals tablet tablet, Take 1 tablet by mouth Daily., Disp: , Rfl:   •  ofloxacin (OCUFLOX) 0.3 % ophthalmic solution, , Disp: , Rfl:   •  omeprazole (priLOSEC) 20 MG capsule, Take 1 capsule by mouth Daily., Disp: , Rfl:   •  Selenium 200 MCG capsule, , Disp: , Rfl:   •  sulfaSALAzine (AZULFIDINE) 500 MG tablet, Take 2 tablets by mouth 2 (Two) Times a Day., Disp: , Rfl:   •  Turmeric 500 MG tablet, Take 1 tablet by mouth Daily., Disp: , Rfl:   •  vitamin E 100 UNIT capsule, Take 4 capsules by mouth Daily., Disp: , Rfl:     Review of Systems   Respiratory: Positive for apnea.    Genitourinary: Positive for urgency.   Neurological: Positive for dizziness, weakness and light-headedness.   All other systems reviewed and are negative.         Objective:    Vital Signs:   /71   Pulse 81   Ht 165.1 cm (65\")   Wt 77.1 kg (170 lb)   BMI 28.29 kg/m²     Physical Exam  Vitals " reviewed.   Cardiovascular:      Pulses: Normal pulses.   Pulmonary:      Effort: Pulmonary effort is normal.   Neurological:      Mental Status: She is alert and oriented to person, place, and time.   Psychiatric:         Mood and Affect: Mood normal.        Result Review :                Neurologic Exam     Mental Status   Oriented to person, place, and time.         Assessment and Plan    Diagnoses and all orders for this visit:    1. Axonal sensorimotor neuropathy (Primary)    2. Restless legs    3. Polyarthritis with positive rheumatoid factor    4. Anxiety     labs ess normal except mild elevation of rheumatoid factor, fu with pcp in this regard,   No medication needed for rls    Panic attacks resolved with the lexapro      Follow Up   Return if symptoms worsen or fail to improve.  Patient was given instructions and counseling regarding her condition or for health maintenance advice. Please see specific information pulled into the AVS if appropriate.     This document has been electronically signed by Joseph Seipel, MD on April 20, 2023 14:52 EDT

## 2023-04-24 ENCOUNTER — TREATMENT (OUTPATIENT)
Dept: PHYSICAL THERAPY | Facility: CLINIC | Age: 73
End: 2023-04-24
Payer: MEDICARE

## 2023-04-24 DIAGNOSIS — R26.89 BALANCE PROBLEM: Primary | ICD-10-CM

## 2023-04-24 DIAGNOSIS — Z78.9 IMPAIRED MOBILITY AND ADLS: ICD-10-CM

## 2023-04-24 DIAGNOSIS — Z74.09 IMPAIRED MOBILITY AND ADLS: ICD-10-CM

## 2023-04-24 NOTE — PROGRESS NOTES
Physical Therapy Daily Treatment Note    Patient: Meliza Red   : 1950  Referring practitioner: Joseph F Seipel, MD  Diagnoses: Balance problem [R26.89]  Today's Date: 2023    VISIT#: 16    Subjective Evaluation    History of Present Illness  Mechanism of injury: Pt reports she has issues getting into and out of the car seat     Pain  Current pain ratin         Objective     See Exercise, Manual, and Modality Logs for complete treatment.     Cont with NMR and act today; practiced getting into and out of the car using a standard chair       Assessment & Plan     Assessment    Assessment details: nicko well today with working on car transfers      Progress per Plan of Care        Timed:         Manual Therapy:         mins  01014;     Therapeutic Exercise:         mins  08092;     Neuromuscular Kathleen:  10      mins  65463;    Therapeutic Activity:   20       mins  89705;     Gait Training:           mins  85457;     Ultrasound:          mins  43251;    Ionto                                   mins   91810  Self Care                            mins   60181      Un-Timed:  Electrical Stimulation:         mins  13386 ( );  Traction          mins 51390  Canalith Repos                   mins  50917  Low Eval          Mins  22371  Mod Eval          Mins  85367  High Eval                            Mins  88193  Re-Eval                               mins  46182    Timed Treatment: 30     mins   Total Treatment:   30     mins    Shari Lewis PT  Physical Therapist  [place] PT Lic. # [id number]

## 2023-04-27 ENCOUNTER — HOSPITAL ENCOUNTER (OUTPATIENT)
Dept: BONE DENSITY | Facility: HOSPITAL | Age: 73
Discharge: HOME | End: 2023-04-27
Payer: MEDICARE

## 2023-04-27 ENCOUNTER — HOSPITAL ENCOUNTER (OUTPATIENT)
Dept: CT IMAGING | Facility: HOSPITAL | Age: 73
Discharge: HOME | End: 2023-04-27
Payer: MEDICARE

## 2023-04-27 ENCOUNTER — HOSPITAL ENCOUNTER (OUTPATIENT)
Dept: MAMMOGRAPHY | Facility: HOSPITAL | Age: 73
Discharge: HOME | End: 2023-04-27
Payer: MEDICARE

## 2023-04-27 DIAGNOSIS — Z12.31 OTHER SCREENING MAMMOGRAM: ICD-10-CM

## 2023-04-27 DIAGNOSIS — Z87.891 PERSONAL HISTORY OF NICOTINE DEPENDENCE: ICD-10-CM

## 2023-04-27 DIAGNOSIS — R91.1 PULMONARY NODULE: ICD-10-CM

## 2023-04-27 PROCEDURE — 77067 SCR MAMMO BI INCL CAD: CPT

## 2023-04-27 PROCEDURE — 71271 CT THORAX LUNG CANCER SCR C-: CPT

## 2023-04-27 PROCEDURE — 77080 DXA BONE DENSITY AXIAL: CPT

## 2023-04-27 PROCEDURE — 77063 BREAST TOMOSYNTHESIS BI: CPT

## 2023-05-04 ENCOUNTER — TREATMENT (OUTPATIENT)
Dept: PHYSICAL THERAPY | Facility: CLINIC | Age: 73
End: 2023-05-04
Payer: MEDICARE

## 2023-05-04 DIAGNOSIS — Z74.09 IMPAIRED MOBILITY AND ADLS: ICD-10-CM

## 2023-05-04 DIAGNOSIS — R26.89 BALANCE PROBLEM: Primary | ICD-10-CM

## 2023-05-04 DIAGNOSIS — Z78.9 IMPAIRED MOBILITY AND ADLS: ICD-10-CM

## 2023-05-04 NOTE — PROGRESS NOTES
Re-Evaluation/ Re-Certification/Discharge Summary  [address] [facility] [address] [place]  [address]        Patient: Meliza Red   : 1950  Diagnosis/ICD-10 Code:  Balance problem [R26.89]  Referring practitioner: Joseph F Seipel, MD  Date of Initial Visit: Type: THERAPY  Noted: 2023  Today's Date: 2023  Patient seen for 17 sessions      Subjective:     Subjective Questionnaire: ABC: 78%      DGI:    Clinical Progress: improved  Home Program Compliance: Yes  Treatment has included: therapeutic exercise, neuromuscular re-education and therapeutic activity    Subjective Evaluation    History of Present Illness  Mechanism of injury: Pt rates her improvement since the start of PT at 85-90% overall.  She feels better with walking, trimming bushes walking on rocks, descending steps without a rail.      Pain  Current pain ratin         Objective        LE strength:  Right:  Hip flex 4-; knee ext/flex and ankle DF 4; hip abd 4  (imporved)      ext 4 (improved)       Left: Hip flex 4 ; knee ext/flex and ankle DF 4; hip abd and ext  4+ (improved)                                                   MCTSIB:  cond 1:  30 sec no inc sway  `                  cond 2:  30 sec  No  inc sway  (improved)                      cond 3:  30 sec with min inc sway                      cond 4:  15 sec then LOB post/Right  (improved)    ASSESSMENT:    Pt has met all STG/LTG at this time.  Pt continues to have issues with strength, static balance, dynamic balance and functional activities but will continue to improve with continued work on HEP.  DC PT at this time.      STGs:  6 weeks  1.  Pt to tolerate initial HEP/post-treatment regimen.  MET  2.  Pt to tolerate advancement of HEP as indicated.  MET  3.  Pt to report at least 25% improvement in off balance symptoms.  MET  4.  Resolve BPPV as indicated by negative bilateral Roll Tests and [place].   MET  5.  Pt to improve MCTSIB cond 2 to 30 sec with no LOB   MET    LTGs:  By DC  1.  Pt to be (I) with HEP to manage own condition.  MET  2.  Pt to report at least 50% improvement in balance issues symptoms.  MET  3.  Pt to improve function as indicated by improved score on ABC as compared to eval.  MET  4.  Pt to improve balance as indicated by improved performance on DGI  MET      Assessment & Plan       Plan  Therapy options: will be seen for skilled therapy services  Planned therapy interventions: balance/weight-bearing training, neuromuscular re-education, motor coordination training, strengthening, therapeutic activities, home exercise program, gait training and functional ROM exercises  Frequency: 2x week  Treatment plan discussed with: patient  Plan details: 12 weeks       Progress toward previous goals: Partially Met      Recommendations: Continue as planned    Certification Period: 8/1/2023    Prognosis to achieve goals: good    PT Signature: Shari Lewis, PT  [place] PT Lic. # [id number]    Based upon review of the patient's progress and continued therapy plan, it is my medical opinion that Meliza Red should continue physical therapy treatment at [facility] [place] [place] PHY THER [address] [place] [address] PHYSICAL THERAPY  [address] [place] [address] [place] [address] [phone].    Signature: __________________________________  Seipel, Joseph F, MD  Please sign and return via fax to [phone].. Thank you, [facility].    Timed:         Manual Therapy:         mins  61327;     Therapeutic Exercise:         mins  33138;     Neuromuscular Kathleen:  32      mins  81262;    Therapeutic Activity:          mins  40151;     Gait Training:           mins  69336;     Ultrasound:          mins  07470;    Ionto                                   mins   80388  Self Care                            mins   02781      Un-Timed:  Electrical Stimulation:         mins  40556 (MC );  Traction          mins 47106  Canalith Repos:                  mins   45128  Low Eval          Mins   57968  Mod Eval          Mins  39317  High Eval                            Mins  35014  Re-Eval                               mins  06940    Timed Treatment: 32     mins   Total Treatment:  32      mins

## 2023-08-01 ENCOUNTER — TELEPHONE (OUTPATIENT)
Dept: FAMILY MEDICINE CLINIC | Facility: CLINIC | Age: 73
End: 2023-08-01

## 2023-08-01 NOTE — TELEPHONE ENCOUNTER
Caller: RADHA    Relationship to patient: Other    Best call back number: [phone]    Patient is needing: FAXED OVER A FORM TO BE SIGNED BY PROVIDER MALLORY FOR ELBOW, ANKLE AND HIP SUPPORT. CALLING TO CHECK THE STATUS

## 2023-08-11 NOTE — TELEPHONE ENCOUNTER
Caller: [employer]     Relationship: Other    Best call back number: [phone]     What was the call regarding: MEDICAL SUPPLIES

## 2023-08-14 ENCOUNTER — OFFICE (AMBULATORY)
Dept: URBAN - METROPOLITAN AREA CLINIC 64 | Facility: CLINIC | Age: 73
End: 2023-08-14

## 2023-08-14 VITALS
HEIGHT: 65 IN | SYSTOLIC BLOOD PRESSURE: 120 MMHG | WEIGHT: 169 LBS | HEART RATE: 79 BPM | DIASTOLIC BLOOD PRESSURE: 64 MMHG

## 2023-08-14 DIAGNOSIS — K51.50 LEFT SIDED COLITIS WITHOUT COMPLICATIONS: ICD-10-CM

## 2023-08-14 DIAGNOSIS — R74.8 ABNORMAL LEVELS OF OTHER SERUM ENZYMES: ICD-10-CM

## 2023-08-14 DIAGNOSIS — K59.1 FUNCTIONAL DIARRHEA: ICD-10-CM

## 2023-08-14 PROCEDURE — 99214 OFFICE O/P EST MOD 30 MIN: CPT | Performed by: INTERNAL MEDICINE

## 2023-08-14 RX ORDER — COLESTIPOL HYDROCHLORIDE 1 G/1
2 TABLET, FILM COATED ORAL
Qty: 60 | Refills: 11 | Status: COMPLETED
Start: 2023-08-14 | End: 2024-04-08

## 2023-09-02 DIAGNOSIS — I10 ESSENTIAL HYPERTENSION: ICD-10-CM

## 2023-09-04 RX ORDER — LISINOPRIL AND HYDROCHLOROTHIAZIDE 20; 12.5 MG/1; MG/1
2 TABLET ORAL
Qty: 180 TABLET | Refills: 1 | Status: SHIPPED | OUTPATIENT
Start: 2023-09-04

## 2023-10-04 ENCOUNTER — HOSPITAL ENCOUNTER (OUTPATIENT)
Dept: CARDIOLOGY | Facility: HOSPITAL | Age: 73
Discharge: HOME | End: 2023-10-04
Payer: MEDICARE

## 2023-10-04 ENCOUNTER — LAB (OUTPATIENT)
Dept: LAB | Facility: HOSPITAL | Age: 73
End: 2023-10-04
Payer: MEDICARE

## 2023-10-04 ENCOUNTER — TRANSCRIBE ORDERS (OUTPATIENT)
Dept: ADMINISTRATIVE | Facility: HOSPITAL | Age: 73
End: 2023-10-04
Payer: MEDICARE

## 2023-10-04 DIAGNOSIS — H01.005 BLEPHARITIS OF LEFT LOWER EYELID, UNSPECIFIED TYPE: ICD-10-CM

## 2023-10-04 DIAGNOSIS — H04.123 DRY EYES: ICD-10-CM

## 2023-10-04 DIAGNOSIS — H02.422 MYOGENIC PTOSIS OF LEFT EYELID: ICD-10-CM

## 2023-10-04 DIAGNOSIS — H02.422 MYOGENIC PTOSIS OF LEFT EYELID: Primary | ICD-10-CM

## 2023-10-04 LAB
ANION GAP SERPL CALCULATED.3IONS-SCNC: 12.2 MMOL/L (ref 5–15)
BUN SERPL-MCNC: 15 MG/DL (ref 8–23)
BUN/CREAT SERPL: 23.8 (ref 7–25)
CALCIUM SPEC-SCNC: 9.7 MG/DL (ref 8.6–10.5)
CHLORIDE SERPL-SCNC: 103 MMOL/L (ref 98–107)
CO2 SERPL-SCNC: 28.8 MMOL/L (ref 22–29)
CREAT SERPL-MCNC: 0.63 MG/DL (ref 0.57–1)
EGFRCR SERPLBLD CKD-EPI 2021: 95 ML/MIN/1.73
GLUCOSE SERPL-MCNC: 92 MG/DL (ref 65–99)
POTASSIUM SERPL-SCNC: 3.8 MMOL/L (ref 3.5–5.2)
QT INTERVAL: 373 MS
QTC INTERVAL: 416 MS
SODIUM SERPL-SCNC: 144 MMOL/L (ref 136–145)

## 2023-10-04 PROCEDURE — 93005 ELECTROCARDIOGRAM TRACING: CPT | Performed by: OPHTHALMOLOGY

## 2023-10-04 PROCEDURE — 80048 BASIC METABOLIC PNL TOTAL CA: CPT

## 2023-10-04 PROCEDURE — 36415 COLL VENOUS BLD VENIPUNCTURE: CPT

## 2023-10-06 ENCOUNTER — LAB (OUTPATIENT)
Dept: FAMILY MEDICINE CLINIC | Facility: CLINIC | Age: 73
End: 2023-10-06
Payer: MEDICARE

## 2023-10-06 ENCOUNTER — OFFICE VISIT (OUTPATIENT)
Dept: FAMILY MEDICINE CLINIC | Facility: CLINIC | Age: 73
End: 2023-10-06
Payer: MEDICARE

## 2023-10-06 VITALS
WEIGHT: 172 LBS | BODY MASS INDEX: 28.66 KG/M2 | SYSTOLIC BLOOD PRESSURE: 142 MMHG | TEMPERATURE: 97.3 F | HEART RATE: 74 BPM | HEIGHT: 65 IN | DIASTOLIC BLOOD PRESSURE: 78 MMHG | OXYGEN SATURATION: 95 %

## 2023-10-06 DIAGNOSIS — F32.A ANXIETY AND DEPRESSION: ICD-10-CM

## 2023-10-06 DIAGNOSIS — E78.2 MIXED HYPERLIPIDEMIA: ICD-10-CM

## 2023-10-06 DIAGNOSIS — Z23 NEED FOR INFLUENZA VACCINATION: ICD-10-CM

## 2023-10-06 DIAGNOSIS — I10 PRIMARY HYPERTENSION: Primary | ICD-10-CM

## 2023-10-06 DIAGNOSIS — F41.9 ANXIETY AND DEPRESSION: ICD-10-CM

## 2023-10-06 DIAGNOSIS — I10 PRIMARY HYPERTENSION: ICD-10-CM

## 2023-10-06 DIAGNOSIS — M25.50 ARTHRALGIA, UNSPECIFIED JOINT: ICD-10-CM

## 2023-10-06 LAB
ALBUMIN SERPL-MCNC: 4.4 G/DL (ref 3.5–5.2)
ALBUMIN/GLOB SERPL: 1.7 G/DL
ALP SERPL-CCNC: 84 U/L (ref 39–117)
ALT SERPL W P-5'-P-CCNC: 57 U/L (ref 1–33)
ANION GAP SERPL CALCULATED.3IONS-SCNC: 12.3 MMOL/L (ref 5–15)
AST SERPL-CCNC: 55 U/L (ref 1–32)
BILIRUB SERPL-MCNC: 0.3 MG/DL (ref 0–1.2)
BUN SERPL-MCNC: 15 MG/DL (ref 8–23)
BUN/CREAT SERPL: 22.7 (ref 7–25)
CALCIUM SPEC-SCNC: 9.6 MG/DL (ref 8.6–10.5)
CHLORIDE SERPL-SCNC: 101 MMOL/L (ref 98–107)
CHOLEST SERPL-MCNC: 249 MG/DL (ref 0–200)
CO2 SERPL-SCNC: 27.7 MMOL/L (ref 22–29)
CREAT SERPL-MCNC: 0.66 MG/DL (ref 0.57–1)
EGFRCR SERPLBLD CKD-EPI 2021: 93.9 ML/MIN/1.73
GLOBULIN UR ELPH-MCNC: 2.6 GM/DL
GLUCOSE SERPL-MCNC: 98 MG/DL (ref 65–99)
HDLC SERPL-MCNC: 92 MG/DL (ref 40–60)
LDLC SERPL CALC-MCNC: 127 MG/DL (ref 0–100)
LDLC/HDLC SERPL: 1.32 {RATIO}
POTASSIUM SERPL-SCNC: 3.5 MMOL/L (ref 3.5–5.2)
PROT SERPL-MCNC: 7 G/DL (ref 6–8.5)
SODIUM SERPL-SCNC: 141 MMOL/L (ref 136–145)
TRIGL SERPL-MCNC: 176 MG/DL (ref 0–150)
VLDLC SERPL-MCNC: 30 MG/DL (ref 5–40)

## 2023-10-06 PROCEDURE — 80061 LIPID PANEL: CPT | Performed by: NURSE PRACTITIONER

## 2023-10-06 PROCEDURE — 36415 COLL VENOUS BLD VENIPUNCTURE: CPT

## 2023-10-06 PROCEDURE — 80053 COMPREHEN METABOLIC PANEL: CPT | Performed by: NURSE PRACTITIONER

## 2023-10-06 RX ORDER — MELOXICAM 15 MG/1
15 TABLET ORAL DAILY
Qty: 90 TABLET | Refills: 3 | Status: SHIPPED | OUTPATIENT
Start: 2023-10-06

## 2023-10-06 RX ORDER — MELOXICAM 15 MG/1
1 TABLET ORAL DAILY
COMMUNITY
End: 2023-10-06 | Stop reason: SDUPTHER

## 2023-10-06 NOTE — PROGRESS NOTES
Subjective   Meliza Red is a 73 y.o. female.       HPI   Pt is here today for 6 month follow up.   1) HTN - currently on amlodipine 10 mg daily; carvedilol 50 mg bid; lisinopril-hctz 20-12.5 mg two tabs daily.    Denies any CP; palpitations; SOA: dizziness; headache; trouble with vision.    2) Hyperlipidemia - currently on atorvastatin 20 mg daily.  No concerns.   3) Anxiety - currently on escitalopram 10 mg daily.   Moods stable.  Denies any SI or HI.   4) Arthritis - given meloxicam from Ortho and would like us to take over refills.      The following portions of the patient's history were reviewed and updated as appropriate: allergies, current medications, past family history, past medical history, past social history, past surgical history, and problem list.    Review of Systems   Constitutional:  Negative for chills, fatigue and fever.   Respiratory:  Negative for cough, shortness of breath and wheezing.    Cardiovascular:  Negative for chest pain and palpitations.   Gastrointestinal:  Negative for diarrhea, nausea and vomiting.   Genitourinary:  Negative for dysuria, frequency, hematuria and urgency.   Musculoskeletal:  Positive for arthralgias.   Neurological:  Negative for dizziness, weakness, light-headedness and headache.   Psychiatric/Behavioral:  Negative for depressed mood. The patient is not nervous/anxious.      Objective   Physical Exam  Vitals reviewed.   Constitutional:       General: She is not in acute distress.     Appearance: Normal appearance.   Cardiovascular:      Rate and Rhythm: Normal rate and regular rhythm.      Pulses: Normal pulses.      Heart sounds: Normal heart sounds. No murmur heard.  Pulmonary:      Effort: Pulmonary effort is normal. No respiratory distress.      Breath sounds: Normal breath sounds. No wheezing or rhonchi.   Chest:      Chest wall: No tenderness.   Abdominal:      Tenderness: There is no right CVA tenderness or left CVA tenderness.   Skin:     General:  Skin is warm and dry.      Findings: No erythema.   Neurological:      General: No focal deficit present.      Mental Status: She is alert and oriented to person, place, and time.   Psychiatric:         Mood and Affect: Mood normal.                Assessment & Plan   Diagnoses and all orders for this visit:    1. Primary hypertension (Primary)  Comments:  Stable.   Cont. current medication.   Labs ordered.   RTO in 6 mo.  Orders:  -     Comprehensive metabolic panel; Future  -     Lipid panel; Future    2. Mixed hyperlipidemia  Comments:  Stable.   Cont. current medication.   Labs ordered.   RTO in 6 mo.  Orders:  -     Comprehensive metabolic panel; Future  -     Lipid panel; Future    3. Anxiety and depression  Comments:  Stable.   Cont. current medication.   Labs ordered.   RTO in 6 mo.    4. Arthralgia, unspecified joint  Comments:  Given meloxicam 15 mg daily.  Orders:  -     meloxicam (MOBIC) 15 MG tablet; Take 1 tablet by mouth Daily.  Dispense: 90 tablet; Refill: 3

## 2023-10-08 DIAGNOSIS — E78.2 MIXED HYPERLIPIDEMIA: ICD-10-CM

## 2023-10-08 RX ORDER — ATORVASTATIN CALCIUM 20 MG/1
TABLET, FILM COATED ORAL
Qty: 90 TABLET | Refills: 0 | Status: SHIPPED | OUTPATIENT
Start: 2023-10-08

## 2023-10-09 NOTE — PROGRESS NOTES
Left message to return call to doctor's office at [facility]. Either to get test results or message from provider.

## 2023-11-21 NOTE — PROGRESS NOTES
Subjective   Meliza Red is a 73 y.o. female is here for follow up for lower back pain. Patient was last seen 2 years ago in 2021 for lower back pain. Her pain started worsening in 9/24. She had seen Orthopedics who had ordered Lumbar MRI. Pain improved over time but has worsened since she is trying to move. Meloxicam had helped initially but it is not helping much.     On last visit:     Lower back pain is 8/10 on VAS, at maximum is 9/10. Pain is dull, achy in nature. Pain is referred left buttock, lateral left thigh and left leg. Intermittent numbness and tingling. The pain is constant. The pain is improved by meloxicam and caudal INGE. The pain is worse with laying on left side, walking, standing for long time. Feels like left leg gives out. Has been using cane for 1 week.     Previous Injection:      here for follow-up for lower back pain.  She was last seen on 9/23/2021 for caudal INGE with 100% pain relief. Mild b/l hip pain with excessive walking but overall tolerable.         On last visit:        PHQ-9- 4  SOAPP- 5     Previous Injection:   9/20/2021-caudal INGE- 100% pain relief.   8/7/2021-caudal INGE-80% pain relief for 1 month.     PMH:   Back surgeries x2 (2011) - lumbar disectomy/ decompression L3 -S1; laminectomies L4-5, L5-S1, R hip arthroplasty - 2011,  hypertension, tobacco use, left knee OA, bladder surgery-InterStim, GERD, ulcerative colitis.         Current Medications:   Meloxicam 15 daily PRN  Citalopram      Hx: referred by Dr. Fermin, Ana Harrington MD..  Her pain started about 2 months ago and has been getting worse since then.      Past Medications:   Diclofenac 50 mg twice daily as needed  Past Modalities:  TENS:                                                                          YES                                               Physical Therapy Within The Last 6 Months              no  Psychotherapy                                                            no  Massage Therapy                                                        no     Patient Complains Of:  Uro-Fecal Incontinence          No (chronic urinary continence)   Weight Gain/Loss                   no  Fever/Chills                             no  Weakness                               no       Current Outpatient Medications:     amLODIPine (NORVASC) 10 MG tablet, TAKE ONE TABLET BY MOUTH DAILY WITH DINNER, Disp: 90 tablet, Rfl: 1    Apremilast (Otezla) 10 & 20 & 30 MG tablet therapy pack, , Disp: , Rfl:     atorvastatin (LIPITOR) 20 MG tablet, TAKE ONE TABLET BY MOUTH ONCE NIGHTLY, Disp: 90 tablet, Rfl: 0    Bromfenac Sodium (Prolensa) 0.07 % solution, Apply 1 drop to eye(s) as directed by provider Every 12 (Twelve) Hours., Disp: , Rfl:     Calcium Carbonate (CALCIUM 600 PO), Take  by mouth., Disp: , Rfl:     carvedilol (COREG) 25 MG tablet, TAKE TWO TABLETS BY MOUTH TWICE A DAY WITH MEALS, Disp: 360 tablet, Rfl: 1    Cholecalciferol (VITAMIN D3) 125 MCG (5000 UT) tablet tablet, Take 1 tablet by mouth., Disp: , Rfl:     colestipol (COLESTID) 1 g tablet, , Disp: , Rfl:     difluprednate (DUREZOL) 0.05 % ophthalmic emulsion, Apply 1 drop to eye(s) as directed by provider Every 6 (Six) Hours., Disp: , Rfl:     escitalopram (Lexapro) 10 MG tablet, Take 1 tablet by mouth Daily., Disp: 90 tablet, Rfl: 3    folic acid (FOLVITE) 1 MG tablet, Take 1 tablet by mouth Daily., Disp: , Rfl:     KRILL OIL PO, Take  by mouth., Disp: , Rfl:     lisinopril-hydrochlorothiazide (PRINZIDE,ZESTORETIC) 20-12.5 MG per tablet, Take 2 tablets by mouth Daily With Breakfast., Disp: 180 tablet, Rfl: 1    meloxicam (MOBIC) 15 MG tablet, Take 1 tablet by mouth Daily., Disp: 90 tablet, Rfl: 3    Milk Thistle 175 MG capsule, , Disp: , Rfl:     multivitamin with minerals tablet tablet, Take 1 tablet by mouth Daily., Disp: , Rfl:     ofloxacin (OCUFLOX) 0.3 % ophthalmic solution, , Disp: , Rfl:     omeprazole (priLOSEC) 20 MG capsule, Take 1 capsule by mouth Daily.,  Disp: , Rfl:     Selenium 200 MCG capsule, , Disp: , Rfl:     sulfaSALAzine (AZULFIDINE) 500 MG tablet, Take 2 tablets by mouth 2 (Two) Times a Day., Disp: , Rfl:     Turmeric 500 MG tablet, Take 1 tablet by mouth Daily., Disp: , Rfl:     The following portions of the patient's history were reviewed and updated as appropriate: allergies, current medications, past family history, past medical history, past social history, past surgical history, and problem list.      REVIEW OF PERTINENT MEDICAL DATA    Past Medical History:   Diagnosis Date    Allergic     Arthritis 20 hrs ago?    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    CTS (carpal tunnel syndrome)     Difficulty walking     Diverticulosis     Fractures 1994    Broke ankle in 17 inches of snow.    GERD (gastroesophageal reflux disease)     Hepatic hemangioma 10/2017    Seen on MRI of the liver    Hyperlipidemia     Hypertension     Irritable bowel syndrome 2015    Joint pain     Knee - shoulder - hip (replaced in 2011)    Leg pain     Low back pain     Pneumonia     Sleep apnea     Spinal stenosis 2011    Had 2 back surgeries---narrowing at L3-4 junction    Ulcerative colitis 03/2018    Followed by GI, Dr. Vanda Sotomayor; treated with sulfasalazine    Visual impairment Jan. 12, 2022    Started with double vision.  Ended up having cataracts removed and corrected to mono vision.  Having a lot of problems with jr up vision in left eye.     Past Surgical History:   Procedure Laterality Date    ANKLE SURGERY Right     broken right ankle     BACK SURGERY      BLADDER SURGERY      bladder sling     BREAST AUGMENTATION Bilateral 2015 & 11/28/2017    recurrent capsular contracture    BREAST SURGERY  1984    2013    CATARACT EXTRACTION, BILATERAL  2022    COLONOSCOPY      EPIDURAL BLOCK      Many times    EYE SURGERY      FRACTURE SURGERY  1993    INTERSTIM PLACEMENT  Right 11/2020    for bladder and bowel control - [employer]    JOINT REPLACEMENT      SKIN BIOPSY  02/06/2019    Shave biopsy of frontal scalp; irritated verruca vulgaris    SPINE SURGERY  2011    TOE SURGERY      big toe left foot     TOTAL HIP ARTHROPLASTY Right     TUBAL ABDOMINAL LIGATION Bilateral      Family History   Problem Relation Age of Onset    Hypertension Mother     Arthritis Mother     Stroke Mother     Dementia Mother     Hypertension Father     Arthritis Father     Hyperlipidemia Father     Stroke Father     Vision loss Father     Dementia Father     Parkinsonism Father     Colon cancer Sister 43    Cancer Sister         Colon    Depression Son     Hearing loss Son     Hyperlipidemia Sister     Hypertension Sister     Hyperlipidemia Brother     Hypertension Brother      Social History     Socioeconomic History    Marital status:    Tobacco Use    Smoking status: Former     Packs/day: 1.00     Years: 30.00     Additional pack years: 0.00     Total pack years: 30.00     Types: Cigarettes     Start date: 1/1/1980     Quit date: 10/13/2013     Years since quitting: 10.1    Smokeless tobacco: Never   Vaping Use    Vaping Use: Never used   Substance and Sexual Activity    Alcohol use: Yes     Alcohol/week: 20.0 standard drinks of alcohol     Types: 20 Shots of liquor per week     Comment: bourbon- couple daily     Drug use: Never    Sexual activity: Yes     Partners: Male     Birth control/protection: Post-menopausal         Review of Systems   Musculoskeletal:  Positive for arthralgias and back pain.         Vitals:    11/22/23 1009   BP: 139/69   Pulse: 79   Resp: 16   SpO2: 95%   PainSc:   8         Objective   Physical Exam  Musculoskeletal:         General: Tenderness present.        Legs:            Imaging Reviewed:    Imaging Reviewed:    MRI lumbar spine- 10/3/23-   S/p L4, L5 laminectomies  Modic type I degenerative changes  - L3-5 - mild spinal canal narrowing and moderate to severe b/l  foraminal narrowing.   - L4-5 - facet arthropathy causing modecate to severe b/l neural foraminal narrowing. S/p parker  L5-S1 - facet arthropathy causing modecate to severe b/l neural foraminal narrowing. S/p parker      CT lumbar spine-7/20/2021-independently reviewed  P65-F5-xt central stenosis or facet changes.  L3-L4 -facet arthritis. No stenosis  D5-Z9-hchaplnri neuroforaminal narrowing. Greater than left due to spurring and facet arthritis. There may be nerve impingement especially in the right side.  U1-V0-cgisdnotl neuroforaminal narrowing secondary to prominent endplate spurring. Mild facet arthritis. No spinal stenosis.     Assessment:    1. Lumbar radiculopathy    2. Hx of decompressive lumbar laminectomy         Plan:   1. Defer UDS for now.   2. We discussed trying a course of formal physical therapy.  Physical therapy can help strengthen and stretch the muscles around the joints. Continue to be as active as possible.   3. Lumbar MRI was reviewed with patient. Patient has pain in the lower back with radicular pain in the leg, patient has failed conservative therapy including PT and pharmacological management for more than 6 weeks and pain interferes with activities of daily living. MRI shows severe b/l foraminal narrowing at multiple levels. Good relief previously with Caudal INGE. Discussed caudal INGE, Discussed the possibility of infection, bleeding, nerve damage, post dural puncture headache, increased pain, paraplegia. Patient understands and agrees.         RTC for caudal INGE.     Uche Huizar DO  Pain Management   [facility]             INSPECT REPORT    As part of the patient's treatment plan, I may be prescribing controlled substances. The patient has been made aware of appropriate use of such medications, including potential risk of somnolence, limited ability to drive and/or work safely, and the potential for dependence or overdose. It has also been made clear that these medications are for  use by this patient only, without concomitant use of alcohol or other substances unless prescribed.     Patient has completed prescribing agreement detailing terms of continued prescribing of controlled substances, including monitoring INSPECT reports, urine drug screening, and pill counts if necessary. The patient is aware that inappropriate use will results in cessation of prescribing such medications.    INSPECT report has been reviewed and scanned into the patient's chart.

## 2023-11-22 ENCOUNTER — OFFICE VISIT (OUTPATIENT)
Dept: PAIN MEDICINE | Facility: CLINIC | Age: 73
End: 2023-11-22
Payer: MEDICARE

## 2023-11-22 VITALS
SYSTOLIC BLOOD PRESSURE: 139 MMHG | DIASTOLIC BLOOD PRESSURE: 69 MMHG | HEART RATE: 79 BPM | OXYGEN SATURATION: 95 % | RESPIRATION RATE: 16 BRPM

## 2023-11-22 DIAGNOSIS — Z98.890 HX OF DECOMPRESSIVE LUMBAR LAMINECTOMY: ICD-10-CM

## 2023-11-22 DIAGNOSIS — M54.16 LUMBAR RADICULOPATHY: Primary | ICD-10-CM

## 2023-11-27 ENCOUNTER — HOSPITAL ENCOUNTER (OUTPATIENT)
Facility: HOSPITAL | Age: 73
Setting detail: HOSPITAL OUTPATIENT SURGERY
Discharge: HOME | End: 2023-11-27
Attending: INTERNAL MEDICINE | Admitting: INTERNAL MEDICINE
Payer: MEDICARE

## 2023-11-27 ENCOUNTER — ON CAMPUS - OUTPATIENT (AMBULATORY)
Dept: URBAN - METROPOLITAN AREA HOSPITAL 114 | Facility: HOSPITAL | Age: 73
End: 2023-11-27

## 2023-11-27 ENCOUNTER — ANESTHESIA EVENT (OUTPATIENT)
Dept: GASTROENTEROLOGY | Facility: HOSPITAL | Age: 73
End: 2023-11-27
Payer: MEDICARE

## 2023-11-27 ENCOUNTER — ANESTHESIA (OUTPATIENT)
Dept: GASTROENTEROLOGY | Facility: HOSPITAL | Age: 73
End: 2023-11-27
Payer: MEDICARE

## 2023-11-27 VITALS
RESPIRATION RATE: 14 BRPM | SYSTOLIC BLOOD PRESSURE: 156 MMHG | DIASTOLIC BLOOD PRESSURE: 73 MMHG | HEART RATE: 66 BPM | HEIGHT: 65 IN | WEIGHT: 170 LBS | TEMPERATURE: 98.4 F | OXYGEN SATURATION: 98 % | BODY MASS INDEX: 28.32 KG/M2

## 2023-11-27 DIAGNOSIS — K21.9 GASTRO-ESOPHAGEAL REFLUX DISEASE WITHOUT ESOPHAGITIS: ICD-10-CM

## 2023-11-27 DIAGNOSIS — K57.10 DIVERTICULOSIS OF SMALL INTESTINE WITHOUT PERFORATION OR ABS: ICD-10-CM

## 2023-11-27 DIAGNOSIS — K76.0 FATTY (CHANGE OF) LIVER, NOT ELSEWHERE CLASSIFIED: ICD-10-CM

## 2023-11-27 DIAGNOSIS — K44.9 DIAPHRAGMATIC HERNIA WITHOUT OBSTRUCTION OR GANGRENE: ICD-10-CM

## 2023-11-27 DIAGNOSIS — R74.8 ELEVATED LIVER ENZYMES: ICD-10-CM

## 2023-11-27 DIAGNOSIS — K29.50 UNSPECIFIED CHRONIC GASTRITIS WITHOUT BLEEDING: ICD-10-CM

## 2023-11-27 DIAGNOSIS — R74.8 ABNORMAL LEVELS OF OTHER SERUM ENZYMES: ICD-10-CM

## 2023-11-27 DIAGNOSIS — K51.90 ULCERATIVE COLITIS: ICD-10-CM

## 2023-11-27 LAB
BASOPHILS # BLD AUTO: 0.1 10*3/MM3 (ref 0–0.2)
BASOPHILS NFR BLD AUTO: 1.1 % (ref 0–1.5)
DEPRECATED RDW RBC AUTO: 42.4 FL (ref 37–54)
EOSINOPHIL # BLD AUTO: 0.1 10*3/MM3 (ref 0–0.4)
EOSINOPHIL NFR BLD AUTO: 0.7 % (ref 0.3–6.2)
ERYTHROCYTE [DISTWIDTH] IN BLOOD BY AUTOMATED COUNT: 12.3 % (ref 12.3–15.4)
HCT VFR BLD AUTO: 37.4 % (ref 34–46.6)
HGB BLD-MCNC: 12.7 G/DL (ref 12–15.9)
INR PPP: 1.1 (ref 0.93–1.1)
LYMPHOCYTES # BLD AUTO: 1.7 10*3/MM3 (ref 0.7–3.1)
LYMPHOCYTES NFR BLD AUTO: 23.9 % (ref 19.6–45.3)
MCH RBC QN AUTO: 33.5 PG (ref 26.6–33)
MCHC RBC AUTO-ENTMCNC: 34 G/DL (ref 31.5–35.7)
MCV RBC AUTO: 98.4 FL (ref 79–97)
MONOCYTES # BLD AUTO: 0.8 10*3/MM3 (ref 0.1–0.9)
MONOCYTES NFR BLD AUTO: 11.2 % (ref 5–12)
NEUTROPHILS NFR BLD AUTO: 4.5 10*3/MM3 (ref 1.7–7)
NEUTROPHILS NFR BLD AUTO: 63.1 % (ref 42.7–76)
NRBC BLD AUTO-RTO: 0 /100 WBC (ref 0–0.2)
PLATELET # BLD AUTO: 254 10*3/MM3 (ref 140–450)
PMV BLD AUTO: 7.1 FL (ref 6–12)
PROTHROMBIN TIME: 11.9 SECONDS (ref 9.6–11.7)
RBC # BLD AUTO: 3.8 10*6/MM3 (ref 3.77–5.28)
WBC NRBC COR # BLD AUTO: 7.1 10*3/MM3 (ref 3.4–10.8)

## 2023-11-27 PROCEDURE — 43239 EGD BIOPSY SINGLE/MULTIPLE: CPT | Performed by: INTERNAL MEDICINE

## 2023-11-27 PROCEDURE — 25010000002 HEPARIN LOCK FLUSH PER 10 UNITS: Performed by: INTERNAL MEDICINE

## 2023-11-27 PROCEDURE — 43242 EGD US FINE NEEDLE BX/ASPIR: CPT | Performed by: INTERNAL MEDICINE

## 2023-11-27 PROCEDURE — 88305 TISSUE EXAM BY PATHOLOGIST: CPT | Performed by: INTERNAL MEDICINE

## 2023-11-27 PROCEDURE — 85025 COMPLETE CBC W/AUTO DIFF WBC: CPT | Performed by: INTERNAL MEDICINE

## 2023-11-27 PROCEDURE — 25010000002 PROPOFOL 200 MG/20ML EMULSION: Performed by: NURSE ANESTHETIST, CERTIFIED REGISTERED

## 2023-11-27 PROCEDURE — 25010000002 LEVOFLOXACIN PER 250 MG: Performed by: NURSE ANESTHETIST, CERTIFIED REGISTERED

## 2023-11-27 PROCEDURE — 25810000003 SODIUM CHLORIDE 0.9 % SOLUTION: Performed by: INTERNAL MEDICINE

## 2023-11-27 PROCEDURE — 88307 TISSUE EXAM BY PATHOLOGIST: CPT | Performed by: INTERNAL MEDICINE

## 2023-11-27 PROCEDURE — 85610 PROTHROMBIN TIME: CPT | Performed by: INTERNAL MEDICINE

## 2023-11-27 PROCEDURE — 88313 SPECIAL STAINS GROUP 2: CPT | Performed by: INTERNAL MEDICINE

## 2023-11-27 PROCEDURE — 25810000003 SODIUM CHLORIDE 0.9 % SOLUTION: Performed by: NURSE ANESTHETIST, CERTIFIED REGISTERED

## 2023-11-27 PROCEDURE — 25010000002 METRONIDAZOLE 500 MG/100ML SOLUTION: Performed by: NURSE ANESTHETIST, CERTIFIED REGISTERED

## 2023-11-27 PROCEDURE — 88342 IMHCHEM/IMCYTCHM 1ST ANTB: CPT | Performed by: INTERNAL MEDICINE

## 2023-11-27 RX ORDER — LEVOFLOXACIN 5 MG/ML
INJECTION, SOLUTION INTRAVENOUS AS NEEDED
Status: DISCONTINUED | OUTPATIENT
Start: 2023-11-27 | End: 2023-11-27 | Stop reason: SURG

## 2023-11-27 RX ORDER — SODIUM CHLORIDE 9 MG/ML
50 INJECTION, SOLUTION INTRAVENOUS CONTINUOUS
Status: DISCONTINUED | OUTPATIENT
Start: 2023-11-27 | End: 2023-11-27 | Stop reason: HOSPADM

## 2023-11-27 RX ORDER — LIDOCAINE HYDROCHLORIDE 20 MG/ML
INJECTION, SOLUTION EPIDURAL; INFILTRATION; INTRACAUDAL; PERINEURAL AS NEEDED
Status: DISCONTINUED | OUTPATIENT
Start: 2023-11-27 | End: 2023-11-27 | Stop reason: SURG

## 2023-11-27 RX ORDER — NALOXONE HCL 0.4 MG/ML
0.1 VIAL (ML) INJECTION
Status: DISCONTINUED | OUTPATIENT
Start: 2023-11-27 | End: 2023-11-27 | Stop reason: HOSPADM

## 2023-11-27 RX ORDER — SODIUM CHLORIDE 9 MG/ML
INJECTION, SOLUTION INTRAVENOUS CONTINUOUS PRN
Status: DISCONTINUED | OUTPATIENT
Start: 2023-11-27 | End: 2023-11-27 | Stop reason: SURG

## 2023-11-27 RX ORDER — METRONIDAZOLE 500 MG/100ML
INJECTION, SOLUTION INTRAVENOUS AS NEEDED
Status: DISCONTINUED | OUTPATIENT
Start: 2023-11-27 | End: 2023-11-27 | Stop reason: SURG

## 2023-11-27 RX ORDER — PROPOFOL 10 MG/ML
INJECTION, EMULSION INTRAVENOUS AS NEEDED
Status: DISCONTINUED | OUTPATIENT
Start: 2023-11-27 | End: 2023-11-27 | Stop reason: SURG

## 2023-11-27 RX ORDER — HEPARIN SODIUM (PORCINE) LOCK FLUSH IV SOLN 100 UNIT/ML 100 UNIT/ML
SOLUTION INTRAVENOUS AS NEEDED
Status: DISCONTINUED | OUTPATIENT
Start: 2023-11-27 | End: 2023-11-27 | Stop reason: HOSPADM

## 2023-11-27 RX ADMIN — PROPOFOL 25 MG: 10 INJECTION, EMULSION INTRAVENOUS at 13:31

## 2023-11-27 RX ADMIN — PROPOFOL 100 MG: 10 INJECTION, EMULSION INTRAVENOUS at 13:16

## 2023-11-27 RX ADMIN — PROPOFOL 25 MG: 10 INJECTION, EMULSION INTRAVENOUS at 13:29

## 2023-11-27 RX ADMIN — PROPOFOL 25 MG: 10 INJECTION, EMULSION INTRAVENOUS at 13:20

## 2023-11-27 RX ADMIN — PROPOFOL 25 MG: 10 INJECTION, EMULSION INTRAVENOUS at 13:40

## 2023-11-27 RX ADMIN — PROPOFOL 25 MG: 10 INJECTION, EMULSION INTRAVENOUS at 13:43

## 2023-11-27 RX ADMIN — SODIUM CHLORIDE: 9 INJECTION, SOLUTION INTRAVENOUS at 13:16

## 2023-11-27 RX ADMIN — LEVOFLOXACIN 500 MG: 500 INJECTION, SOLUTION INTRAVENOUS at 13:20

## 2023-11-27 RX ADMIN — SODIUM CHLORIDE 50 ML/HR: 9 INJECTION, SOLUTION INTRAVENOUS at 11:47

## 2023-11-27 RX ADMIN — PROPOFOL 25 MG: 10 INJECTION, EMULSION INTRAVENOUS at 13:35

## 2023-11-27 RX ADMIN — PROPOFOL 50 MG: 10 INJECTION, EMULSION INTRAVENOUS at 13:23

## 2023-11-27 RX ADMIN — LIDOCAINE HYDROCHLORIDE 50 MG: 20 INJECTION, SOLUTION EPIDURAL; INFILTRATION; INTRACAUDAL; PERINEURAL at 13:16

## 2023-11-27 RX ADMIN — METRONIDAZOLE 500 MG: 500 INJECTION, SOLUTION INTRAVENOUS at 13:27

## 2023-11-27 RX ADMIN — PROPOFOL 25 MG: 10 INJECTION, EMULSION INTRAVENOUS at 13:38

## 2023-11-27 RX ADMIN — PROPOFOL 25 MG: 10 INJECTION, EMULSION INTRAVENOUS at 13:27

## 2023-11-27 NOTE — ANESTHESIA PREPROCEDURE EVALUATION
Anesthesia Evaluation     Patient summary reviewed and Nursing notes reviewed   NPO Solid Status: > 8 hours  NPO Liquid Status: > 8 hours           Airway   Mallampati: II  TM distance: >3 FB  Neck ROM: full  No difficulty expected  Dental - normal exam     Pulmonary - normal exam   (+) pneumonia ,sleep apnea  Cardiovascular - normal exam    (+) hypertension, hyperlipidemia      Neuro/Psych  (+) numbness  GI/Hepatic/Renal/Endo    (+) hiatal hernia, GERD, liver disease    Musculoskeletal     Abdominal  - normal exam    Bowel sounds: normal.   Substance History      OB/GYN          Other   arthritis,                 Anesthesia Plan    ASA 2     general and MAC     intravenous induction     Anesthetic plan, risks, benefits, and alternatives have been provided, discussed and informed consent has been obtained with: patient.    Plan discussed with CRNA.    CODE STATUS:

## 2023-11-27 NOTE — DISCHARGE INSTRUCTIONS
Endocscopic Ultrasound Liver Biopsy  Endoscopic Ultrasound Liver Biopsy (EUS-LB) provides high-resolution images of both lobes of the liver and a biopsy needle can be safely directed into the liver.   EUS-LB produces specimens at least comparable to, and in some cases better than, percutaneous (through the abdominal skin) or transjugular (through the neck skin) biopsies.  Widely  liver regions can be easily sampled with this technique.  The diagnostic yield of EUS-guided biopsies depends on site, size and characteristics of target tissues as well as technical and procedural factors (type of needle, biopsy technique and material processing).  Liver biopsy is recommended in the following instances:  To diagnose or determine the severity of certain liver diseases (hepatitis B or C, autoimmune hepatitis or non-alcoholic fatty liver disease)  To determine the amount of scar tissue present in the liver (cirrhosis)  To evaluate a mass seen on a previous X-ray test like an ultrasound, CT scan or MRI  To monitor the liver following liver transplantation  Final results of your biopsy take up to 5 business days to process; your GI physician will contact you with your results.  Due to effects of sedation, do not drive or operate heavy machinery for 24 hours.  Avoid heavy lifting (>10 lbs.) or strenuous activity for 48 hours.  Continue to avoid non-steroidal anti-inflammatory medications (NSAIDS) for 5-7 days after the procedure unless told otherwise by your GI and primary care physicians.  Some patients have a temporary sore throat after the procedure; this is a normal finding and over-the-counter lozenges help soothe symptoms.  You may resume normal diet once you are discharged.   You may resume your blood thinner medications (if applicable) as directed by your GI and primary care physicians.  Although complications are rare, there is a small risk of pain, bleeding and infection. Contact your GI physician if you have  these signs or symptoms @ [phone]:  Temperature greater than 101°F  Worsening pain not relieved by medications  Go to your nearest emergency room is you experience:  Shaking, chills or a temperature over 102°F.    New, sudden difficulty breathing.    New pain when taking a deep breath.    New, sudden chest pain.  A cough that produces blood.     EGD findings:  1.  Large hiatal hernia was present the cardia with the top of the gastric folds at 31 cm and the diaphragmatic hiatus at 38 cm.  This is a Hill classification 4.  2.  Presbyesophagus was present  3.  Erythema in the antrum suggestive of nonerosive gastritis, cold forcep biopsies antrum body were taken for H. pylori  4.  Large periampullary diverticulum was present full of food  5.  Large duodenal diverticulum in D3 was present full of food  6.  Normal duodenal mucosa, cold forcep biopsies taken to rule out celiac     Endoscopic ultrasound findings:  1.  Fatty liver of the left and right lobe.  FNB performed of the left and right lobe of the liver and placed in separate jars.  The right lobe biopsy was performed without the syringe prefilled with heparin and most of the specimen was blood clot.  The left lobe biopsy was adequate.  2.  Normal pancreatic EUS  3.  Normal bile ducts with enlarged gallbladder likely from fasting state  4.  Normal celiac axis  5.  Normal left adrenal gland     Impression:  Patient with chronic diarrhea along with ulcerative colitis that does not appear to be active with at least 2 large duodenal diverticulum's present which could be contributing to the patient's diarrhea in the form of SIBO.  Fatty liver visualized on endoscopic ultrasound with successful liver biopsy taken     Recommendations:  1. No NSAIDs or blood thinners for 7 days.   2. Follow up bx results   3. Follow up in clinic with Dr. Sotomayor  4.  Patient may benefit from antibiotic trial for small bowel overgrowth

## 2023-11-27 NOTE — ANESTHESIA POSTPROCEDURE EVALUATION
Patient: Meliza Red    Procedure Summary       Date: 11/27/23 Room / Location: [facility] ENDOSCOPY 2 / [facility] ENDOSCOPY    Anesthesia Start: 1307 Anesthesia Stop: 1351    Procedure: ESOPHAGOGASTRODUODENOSCOPY WITH BIOPSY X2 AREAS AND ENDOSCOPIC ULTRASOUND WITH LIVER BIOPSY Diagnosis:       Ulcerative colitis      Elevated liver enzymes      (Ulcerative colitis [K51.90])      (Elevated liver enzymes [R74.8])    Surgeons: Rolando Summers MD Provider: Mauro Tobar MD    Anesthesia Type: general, MAC ASA Status: 2            Anesthesia Type: general, MAC    Vitals  No vitals data found for the desired time range.          Post Anesthesia Care and Evaluation    Patient location during evaluation: PACU  Patient participation: complete - patient participated  Level of consciousness: awake  Pain management: adequate    Airway patency: patent  Anesthetic complications: No anesthetic complications  PONV Status: none  Cardiovascular status: acceptable  Respiratory status: acceptable  Hydration status: acceptable    Comments: Patient seen and examined postoperatively; vital signs stable; SpO2 greater than or equal to 90%; cardiopulmonary status stable; nausea/vomiting adequately controlled; pain adequately controlled; no apparent anesthesia complications; patient discharged from anesthesia care when discharge criteria were met

## 2023-11-27 NOTE — OP NOTE
ESOPHAGOGASTRODUODENOSCOPY WITH ULTRASOUND AND FINE NEEDLE ASPIRATION Procedure Report    Patient Name:  Meliza Red  YOB: 1950    Date of Surgery:  11/27/2023     Pre-Op Diagnosis:  Ulcerative colitis [K51.90]  Elevated liver enzymes [R74.8]  Diarrhea  GERD    Postop diagnosis:  1.  Hiatal hernia  2.  Gastritis  3.  Duodenal diverticulum  4.  Periampullary diverticulum  5.  Fatty liver      Procedure/CPT® Codes:      Procedure(s):  ESOPHAGOGASTRODUODENOSCOPY WITH BIOPSY X2 AREAS AND ENDOSCOPIC ULTRASOUND WITH LIVER BIOPSY    Staff:  Surgeon(s):  Rolando Summers MD      Anesthesia: Monitored Anesthesia Care    Description of Procedure:  A description of the procedure as well as risks, benefits and alternative methods were explained to the patient who voiced understanding and signed the corresponding consent form. A physical exam was performed and vital signs were monitored throughout the procedure.    An upper GI endoscope was placed into the mouth and proceeded through the esophagus, stomach and second portion of the duodenum without difficulty. The scope was then retroflexed and the fundus was visualized. The procedure was not difficult and there were no immediate complications.    A pentex linear echoendoscope was advanced into the mouth, esophagus, and into the stomach. The celiac axis was visualized, next the scope was used to visualize the pancreatic neck, body, and tail as well as the L lobe of the liver. The scope was advanced into the duodenal bulb where the pancreatic head and ampulla were visualized as well as the biliary tree and R lobe of the liver. The scope was positioned and doppler was used to find an adequate spot for a liver biopsy. A 19g FNB needle was advanced into the R lobe of the liver using a modified wet suction technique with the syringe prefilled with heparin. A single puncture of the liver capsule was done and one actuation of the needle was done once the liver  capsule was breached. Adequate specimen was obtained.  There is no blood loss    Next the scope was withdrawn into the stomach and the L lobe of the liver was visualized. The scope was positioned and doppler was used to find an adequate spot for a liver biopsy. A 19g FNB needle was advanced into the L lobe of the liver using a modified wet suction technique with the syringe prefilled with heparin. A single puncture of the liver capsule was done and one actuation of the needle was done once the liver capsule was breached. Adequate specimen was obtained.  There is no blood loss    There is no blood loss    EGD findings:  1.  Large hiatal hernia was present the cardia with the top of the gastric folds at 31 cm and the diaphragmatic hiatus at 38 cm.  This is a Hill classification 4.  2.  Presbyesophagus was present  3.  Erythema in the antrum suggestive of nonerosive gastritis, cold forcep biopsies antrum body were taken for H. pylori  4.  Large periampullary diverticulum was present full of food  5.  Large duodenal diverticulum in D3 was present full of food  6.  Normal duodenal mucosa, cold forcep biopsies taken to rule out celiac    Endoscopic ultrasound findings:  1.  Fatty liver of the left and right lobe.  FNB performed of the left and right lobe of the liver and placed in separate jars.  The right lobe biopsy was performed without the syringe prefilled with heparin and most of the specimen was blood clot.  The left lobe biopsy was adequate.  2.  Normal pancreatic EUS  3.  Normal bile ducts with enlarged gallbladder likely from fasting state  4.  Normal celiac axis  5.  Normal left adrenal gland    Impression:  Patient with chronic diarrhea along with ulcerative colitis that does not appear to be active with at least 2 large duodenal diverticulum's present which could be contributing to the patient's diarrhea in the form of SIBO.  Fatty liver visualized on endoscopic ultrasound with successful liver biopsy  taken    Recommendations:  1. No NSAIDs or blood thinners for 7 days.   2. Follow up bx results   3. Follow up in clinic with Dr. Sotomayor  4.  Patient may benefit from antibiotic trial for small bowel overgrowth    Rolando Summers MD     Date: 11/27/2023    Time: 13:48 EST

## 2023-11-27 NOTE — H&P
GI CONSULT  NOTE:    Referring Provider:    Angela Dasilva APRN Obert, Jonathan, MD    Chief complaint: <principal problem not specified>    Subjective .       Pre op diagnosis  Ulcerative colitis [K51.90]  Elevated liver enzymes [R74.8]      History of present illness:      Meliza Red is a 73 y.o. female who presents today for Procedure(s):  ENDOSCOPIC ULTRASOUND GUIDED LIVER BIOPSY for the indications listed below.     The updated Patient Profile was reviewed prior to the procedure, in conjunction with the Physical Exam, including medical conditions, surgical procedures, medications, allergies, family history and social history.     Pre-operatively, I reviewed the indication(s) for the procedure, the risks of the procedure [including but not limited to: unexpected bleeding possibly requiring hospitalization and/or unplanned repeat procedures, perforation possibly requiring surgical treatment, missed lesions and complications of sedation/MAC (also explained by anesthesia staff)].     I have evaluated the patient for risks associated with the planned anesthesia and the procedure to be performed and find the patient an acceptable candidate for IV sedation.    Multiple opportunities were provided for any questions or concerns, and all questions were answered satisfactorily before any anesthesia was administered. We will proceed with the planned procedure.    Past Medical History:  Past Medical History:   Diagnosis Date    Allergic     Arthritis 20 hrs ago?    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    CTS (carpal tunnel syndrome)     Difficulty walking     Diverticulosis     Fractures 1994    Broke ankle in 17 inches of snow.    GERD (gastroesophageal reflux disease)     Hepatic hemangioma 10/2017    Seen on MRI of the liver    Hyperlipidemia     Hypertension     Irritable bowel syndrome 2015    Joint pain      Knee - shoulder - hip (replaced in 2011)    Leg pain     Low back pain     Pneumonia     Sleep apnea     Spinal stenosis 2011    Had 2 back surgeries---narrowing at L3-4 junction    Ulcerative colitis 03/2018    Followed by GI, Dr. Vanda Sotomayor; treated with sulfasalazine    Visual impairment Jan. 12, 2022    Started with double vision.  Ended up having cataracts removed and corrected to mono vision.  Having a lot of problems with jr up vision in left eye.       Past Surgical History:  Past Surgical History:   Procedure Laterality Date    ANKLE SURGERY Right     broken right ankle     BACK SURGERY      BLADDER SURGERY      bladder sling     BREAST AUGMENTATION Bilateral 2015 & 11/28/2017    recurrent capsular contracture    BREAST SURGERY  1984    2013    CATARACT EXTRACTION, BILATERAL  2022    COLONOSCOPY      EPIDURAL BLOCK      Many times    EYE SURGERY      FRACTURE SURGERY  1993    INTERSTIM PLACEMENT Right 11/2020    for bladder and bowel control - [employer]    JOINT REPLACEMENT      SKIN BIOPSY  02/06/2019    Shave biopsy of frontal scalp; irritated verruca vulgaris    SPINE SURGERY  2011    TOE SURGERY      big toe left foot     TOTAL HIP ARTHROPLASTY Right     TUBAL ABDOMINAL LIGATION Bilateral        Social History:  Social History     Tobacco Use    Smoking status: Former     Packs/day: 1.00     Years: 30.00     Additional pack years: 0.00     Total pack years: 30.00     Types: Cigarettes     Start date: 1/1/1980     Quit date: 10/13/2013     Years since quitting: 10.1    Smokeless tobacco: Never   Vaping Use    Vaping Use: Never used   Substance Use Topics    Alcohol use: Yes     Alcohol/week: 20.0 standard drinks of alcohol     Types: 20 Shots of liquor per week     Comment: bourbon- couple daily     Drug use: Never       Family History:  Family History   Problem Relation Age of Onset    Hypertension Mother     Arthritis Mother     Stroke Mother     Dementia Mother     Hypertension Father      Arthritis Father     Hyperlipidemia Father     Stroke Father     Vision loss Father     Dementia Father     Parkinsonism Father     Colon cancer Sister 43    Cancer Sister         Colon    Depression Son     Hearing loss Son     Hyperlipidemia Sister     Hypertension Sister     Hyperlipidemia Brother     Hypertension Brother        Medications:  Medications Prior to Admission   Medication Sig Dispense Refill Last Dose    amLODIPine (NORVASC) 10 MG tablet TAKE ONE TABLET BY MOUTH DAILY WITH DINNER 90 tablet 1 11/26/2023    Apremilast (Otezla) 10 & 20 & 30 MG tablet therapy pack    11/27/2023    atorvastatin (LIPITOR) 20 MG tablet TAKE ONE TABLET BY MOUTH ONCE NIGHTLY 90 tablet 0 11/26/2023    Bromfenac Sodium (Prolensa) 0.07 % solution Apply 1 drop to eye(s) as directed by provider Every 12 (Twelve) Hours.   11/27/2023    Calcium Carbonate (CALCIUM 600 PO) Take  by mouth.   Past Week    carvedilol (COREG) 25 MG tablet TAKE TWO TABLETS BY MOUTH TWICE A DAY WITH MEALS 360 tablet 1 11/27/2023    Cholecalciferol (VITAMIN D3) 125 MCG (5000 UT) tablet tablet Take 1 tablet by mouth.   Past Week    colestipol (COLESTID) 1 g tablet    11/26/2023    difluprednate (DUREZOL) 0.05 % ophthalmic emulsion Apply 1 drop to eye(s) as directed by provider Every 6 (Six) Hours.   11/26/2023    escitalopram (Lexapro) 10 MG tablet Take 1 tablet by mouth Daily. 90 tablet 3 11/26/2023    folic acid (FOLVITE) 1 MG tablet Take 1 tablet by mouth Daily.   Past Week    KRILL OIL PO Take  by mouth.   Past Week    lisinopril-hydrochlorothiazide (PRINZIDE,ZESTORETIC) 20-12.5 MG per tablet Take 2 tablets by mouth Daily With Breakfast. 180 tablet 1 11/27/2023    meloxicam (MOBIC) 15 MG tablet Take 1 tablet by mouth Daily. 90 tablet 3 Past Week    Milk Thistle 175 MG capsule    Past Week    multivitamin with minerals tablet tablet Take 1 tablet by mouth Daily.   Past Week    ofloxacin (OCUFLOX) 0.3 % ophthalmic solution    Past Week    omeprazole  "(priLOSEC) 20 MG capsule Take 1 capsule by mouth Daily.   11/27/2023    Selenium 200 MCG capsule        sulfaSALAzine (AZULFIDINE) 500 MG tablet Take 2 tablets by mouth 2 (Two) Times a Day.   11/26/2023    Turmeric 500 MG tablet Take 1 tablet by mouth Daily.   Past Week       Scheduled Meds:   Continuous Infusions:sodium chloride, 50 mL/hr, Last Rate: 50 mL/hr (11/27/23 1147)      PRN Meds:.    ALLERGIES:  Codeine    ROS:  The following systems were reviewed and negative;  Constitution:  No fevers, chills, no unintentional weight loss  Skin: no rash, no jaundice  Eyes:  No blurry vision, no eye pain  HENT:  No change in hearing or smell  Resp:  No dyspnea or cough  CV:  No chest pain or palpitations  :  No dysuria, hematuria  Musculoskeletal:  No leg cramps or arthralgias  Neuro:  No tremor, no numbness  Psych:  No depression or confsusion    Objective     Vital Signs:   Vitals:    11/13/23 1542 11/27/23 1144   BP:  130/59   BP Location:  Left arm   Patient Position:  Sitting   Pulse:  74   Resp:  15   Temp:  98.1 °F (36.7 °C)   TempSrc:  Oral   SpO2:  93%   Weight: 77.1 kg (170 lb)    Height: 165.1 cm (65\")        Physical Exam:       General Appearance:    Awake and alert, in no acute distress   Head:    Normocephalic, without obvious abnormality, atraumatic   Throat:   No oral lesions, no thrush, oral mucosa moist   Lungs:     respirations regular, even and unlabored   Skin:   No rash, no jaundice       Results Review:  Lab Results (last 24 hours)       Procedure Component Value Units Date/Time    Protime-INR [[id number]]  (Abnormal) Collected: 11/27/23 1130    Specimen: Blood Updated: 11/27/23 1159     Protime 11.9 Seconds      INR 1.10    CBC & Differential [[id number]]  (Abnormal) Collected: 11/27/23 1130    Specimen: Blood Updated: 11/27/23 1146    Narrative:      The following orders were created for panel order CBC & Differential.  Procedure                               Abnormality         Status           "           ---------                               -----------         ------                     CBC Auto Differential[[id number]]        Abnormal            Final result                 Please view results for these tests on the individual orders.    CBC Auto Differential [[id number]]  (Abnormal) Collected: 11/27/23 1130    Specimen: Blood Updated: 11/27/23 1146     WBC 7.10 10*3/mm3      RBC 3.80 10*6/mm3      Hemoglobin 12.7 g/dL      Hematocrit 37.4 %      MCV 98.4 fL      MCH 33.5 pg      MCHC 34.0 g/dL      RDW 12.3 %      RDW-SD 42.4 fl      MPV 7.1 fL      Platelets 254 10*3/mm3      Neutrophil % 63.1 %      Lymphocyte % 23.9 %      Monocyte % 11.2 %      Eosinophil % 0.7 %      Basophil % 1.1 %      Neutrophils, Absolute 4.50 10*3/mm3      Lymphocytes, Absolute 1.70 10*3/mm3      Monocytes, Absolute 0.80 10*3/mm3      Eosinophils, Absolute 0.10 10*3/mm3      Basophils, Absolute 0.10 10*3/mm3      nRBC 0.0 /100 WBC             Imaging Results (Last 24 Hours)       ** No results found for the last 24 hours. **             I reviewed the patient's labs and imaging.    ASSESSMENT AND PLAN:      Active Problems:    * No active hospital problems. *       Procedure(s):  ENDOSCOPIC ULTRASOUND GUIDED LIVER BIOPSY      I discussed the patient's findings and my recommendations with the patient.    Rolando Summers MD  11/27/23  12:57 EST

## 2023-11-29 LAB
CYTO UR: NORMAL
LAB AP CASE REPORT: NORMAL
LAB AP CLINICAL INFORMATION: NORMAL
PATH REPORT.FINAL DX SPEC: NORMAL
PATH REPORT.GROSS SPEC: NORMAL

## 2023-12-01 ENCOUNTER — HOSPITAL ENCOUNTER (OUTPATIENT)
Dept: PAIN MEDICINE | Facility: HOSPITAL | Age: 73
Discharge: HOME | End: 2023-12-01
Payer: MEDICARE

## 2023-12-01 VITALS
SYSTOLIC BLOOD PRESSURE: 138 MMHG | BODY MASS INDEX: 28.32 KG/M2 | OXYGEN SATURATION: 93 % | HEART RATE: 70 BPM | TEMPERATURE: 97.3 F | HEIGHT: 65 IN | DIASTOLIC BLOOD PRESSURE: 74 MMHG | RESPIRATION RATE: 16 BRPM | WEIGHT: 170 LBS

## 2023-12-01 DIAGNOSIS — M54.16 LUMBAR RADICULOPATHY: Primary | ICD-10-CM

## 2023-12-01 DIAGNOSIS — R52 PAIN: ICD-10-CM

## 2023-12-01 PROCEDURE — 25010000002 METHYLPREDNISOLONE PER 80 MG: Performed by: STUDENT IN AN ORGANIZED HEALTH CARE EDUCATION/TRAINING PROGRAM

## 2023-12-01 PROCEDURE — 77003 FLUOROGUIDE FOR SPINE INJECT: CPT

## 2023-12-01 PROCEDURE — 25510000001 IOPAMIDOL 41 % SOLUTION: Performed by: STUDENT IN AN ORGANIZED HEALTH CARE EDUCATION/TRAINING PROGRAM

## 2023-12-01 RX ORDER — METHYLPREDNISOLONE ACETATE 80 MG/ML
80 INJECTION, SUSPENSION INTRA-ARTICULAR; INTRALESIONAL; INTRAMUSCULAR; SOFT TISSUE ONCE
Status: COMPLETED | OUTPATIENT
Start: 2023-12-01 | End: 2023-12-01

## 2023-12-01 RX ORDER — IOPAMIDOL 408 MG/ML
3 INJECTION, SOLUTION INTRATHECAL
Status: COMPLETED | OUTPATIENT
Start: 2023-12-01 | End: 2023-12-01

## 2023-12-01 RX ADMIN — IOPAMIDOL 3 ML: 408 INJECTION, SOLUTION INTRATHECAL at 14:47

## 2023-12-01 RX ADMIN — METHYLPREDNISOLONE ACETATE 80 MG: 80 INJECTION, SUSPENSION INTRA-ARTICULAR; INTRALESIONAL; INTRAMUSCULAR; SOFT TISSUE at 14:47

## 2023-12-01 NOTE — PROCEDURES
PREOPERATIVE DIAGNOS  1.         Lumbar DDD     POSTOPERATIVE DIAGNOSIS:  1.  Same     PROCEDURE:  Caudal Epidural Steroid Injection      PROCEDURE NOTE:  After obtaining written informed consent patient was taken to the procedure room. Pre-procedure blood pressure and pulse were stable and recorded in patients clinic chart.      The patient was placed in the prone position on fluoroscopy table.  The lower back was prepped with chloraprep times three and draped in the usual sterile fashion.  The skin over the sacral hiatus was identified under fluoroscopic guidance and infiltrated with 1% lidocaine for local anesthesia via 25 gauge needle.  An 20-g spinal needle was used to access the epidural space under fluoroscopic guidance. 2 cc of the omnipaque dye was injected through the catheter with good spread seen from L5-S2 area.  80 mg of depomedrol  with 4 cc of saline as injected. There was no evidence of CSF, paresthesia or heme. The needle was cleared with preservative free local anesthetic and removed. Skin was cleaned and a sterile dressing was applied.     Following the procedure the patient's vital signs were stable. The patient was discharged home in good condition after being given discharge instructions.     COMPLICATIONS: None     Uche Huizar DO  Pain Management   [facility]

## 2023-12-01 NOTE — DISCHARGE INSTRUCTIONS

## 2023-12-01 NOTE — H&P
H and P reviewed from previous visit and no changes to patient's clinical presentation. Will proceed with procedure as planned. Patient denies history of DM and being on blood thinners.    Uche Huizar DO  Pain Management   [facility]

## 2023-12-04 ENCOUNTER — TELEPHONE (OUTPATIENT)
Dept: PAIN MEDICINE | Facility: HOSPITAL | Age: 73
End: 2023-12-04
Payer: MEDICARE

## 2023-12-06 ENCOUNTER — OFFICE (AMBULATORY)
Dept: URBAN - METROPOLITAN AREA CLINIC 64 | Facility: CLINIC | Age: 73
End: 2023-12-06

## 2023-12-06 VITALS
HEIGHT: 65 IN | HEART RATE: 74 BPM | WEIGHT: 172 LBS | SYSTOLIC BLOOD PRESSURE: 135 MMHG | DIASTOLIC BLOOD PRESSURE: 75 MMHG

## 2023-12-06 DIAGNOSIS — K75.81 NONALCOHOLIC STEATOHEPATITIS (NASH): ICD-10-CM

## 2023-12-06 DIAGNOSIS — K51.50 LEFT SIDED COLITIS WITHOUT COMPLICATIONS: ICD-10-CM

## 2023-12-06 DIAGNOSIS — K44.9 DIAPHRAGMATIC HERNIA WITHOUT OBSTRUCTION OR GANGRENE: ICD-10-CM

## 2023-12-06 DIAGNOSIS — R19.7 DIARRHEA, UNSPECIFIED: ICD-10-CM

## 2023-12-06 PROCEDURE — 99214 OFFICE O/P EST MOD 30 MIN: CPT | Performed by: INTERNAL MEDICINE

## 2023-12-06 RX ORDER — SULFASALAZINE 500 MG/1
2000 TABLET ORAL
Qty: 360 | Refills: 3 | Status: ACTIVE
Start: 2018-10-31

## 2023-12-06 RX ORDER — FOLIC ACID 1 MG/1
1 TABLET ORAL
Qty: 90 | Refills: 3 | Status: ACTIVE
Start: 2020-02-28

## 2023-12-06 RX ORDER — ZINC GLUCONATE 50 MG
50 TABLET ORAL
Qty: 30 | Refills: 11 | Status: ACTIVE
Start: 2023-12-06

## 2023-12-22 DIAGNOSIS — I10 ESSENTIAL HYPERTENSION: ICD-10-CM

## 2023-12-22 RX ORDER — CARVEDILOL 25 MG/1
TABLET ORAL
Qty: 360 TABLET | Refills: 1 | Status: SHIPPED | OUTPATIENT
Start: 2023-12-22

## 2024-01-04 DIAGNOSIS — E78.2 MIXED HYPERLIPIDEMIA: ICD-10-CM

## 2024-01-04 RX ORDER — ATORVASTATIN CALCIUM 20 MG/1
TABLET, FILM COATED ORAL
Qty: 90 TABLET | Refills: 0 | Status: SHIPPED | OUTPATIENT
Start: 2024-01-04

## 2024-01-16 NOTE — PROGRESS NOTES
Subjective   Meliza Red is a 74 y.o. female is here for follow up for lower back pain.  Patient was last seen on 12/1/2023 for caudal INGE with excellent improvement. She has increased b/l hip/ troch bursa pain after moving and overworking.     On last visit:     Lower back pain is 0/10 on VAS. Pain is dull, achy in nature. Pain is referred left buttock, lateral left thigh and left leg. Intermittent numbness and tingling- mostly resolved after caudal INGE. The pain is constant. The pain is improved by meloxicam and caudal INGE. The pain is worse with laying on left side, walking, standing for long time.     B/l hip/ trochanter bursa pain is 3/10 on VAS. Intermittent, only when she lays down on it at night time.     Previous Injection:   12/1/2023-caudal INGE- 100% pain relief.   9/20/2021-caudal INGE- 100% pain relief for 1.5 years.  8/7/2021-caudal INGE-80% pain relief for 1 month.     PHQ-9- 4  SOAPP- 5     PMH:   Back surgeries x2 (2011) - lumbar disectomy/ decompression L3 -S1; laminectomies L4-5, L5-S1, R hip arthroplasty - 2011,  hypertension, tobacco use, left knee OA, bladder surgery-InterStim, GERD, ulcerative colitis.         Current Medications:   Meloxicam 15 daily PRN  Citalopram      Hx: referred by Dr. Fermin, Ana Harrington MD..  Her pain started about 2 months ago and has been getting worse since then.      Past Medications:   Diclofenac 50 mg twice daily as needed\    Past Modalities:  TENS:                                                                          YES                                               Physical Therapy Within The Last 6 Months              no  Psychotherapy                                                            no  Massage Therapy                                                       no     Patient Complains Of:  Uro-Fecal Incontinence          No (chronic urinary continence)   Weight Gain/Loss                   no  Fever/Chills                              no  Weakness                               no       Current Outpatient Medications:     amLODIPine (NORVASC) 10 MG tablet, TAKE ONE TABLET BY MOUTH DAILY WITH DINNER, Disp: 90 tablet, Rfl: 1    Apremilast (Otezla) 10 & 20 & 30 MG tablet therapy pack, , Disp: , Rfl:     atorvastatin (LIPITOR) 20 MG tablet, TAKE ONE TABLET BY MOUTH ONCE NIGHTLY, Disp: 90 tablet, Rfl: 0    Bromfenac Sodium (Prolensa) 0.07 % solution, Apply 1 drop to eye(s) as directed by provider Every 12 (Twelve) Hours., Disp: , Rfl:     Calcium Carbonate (CALCIUM 600 PO), Take  by mouth., Disp: , Rfl:     carvedilol (COREG) 25 MG tablet, TAKE TWO TABLETS BY MOUTH TWICE A DAY WITH MEALS, Disp: 360 tablet, Rfl: 1    Cholecalciferol (VITAMIN D3) 125 MCG (5000 UT) tablet tablet, Take 1 tablet by mouth., Disp: , Rfl:     colestipol (COLESTID) 1 g tablet, , Disp: , Rfl:     difluprednate (DUREZOL) 0.05 % ophthalmic emulsion, Apply 1 drop to eye(s) as directed by provider Every 6 (Six) Hours., Disp: , Rfl:     escitalopram (Lexapro) 10 MG tablet, Take 1 tablet by mouth Daily., Disp: 90 tablet, Rfl: 3    folic acid (FOLVITE) 1 MG tablet, Take 1 tablet by mouth Daily., Disp: , Rfl:     KRILL OIL PO, Take  by mouth., Disp: , Rfl:     lisinopril-hydrochlorothiazide (PRINZIDE,ZESTORETIC) 20-12.5 MG per tablet, Take 2 tablets by mouth Daily With Breakfast., Disp: 180 tablet, Rfl: 1    meloxicam (MOBIC) 15 MG tablet, Take 1 tablet by mouth Daily., Disp: 90 tablet, Rfl: 3    Milk Thistle 175 MG capsule, , Disp: , Rfl:     multivitamin with minerals tablet tablet, Take 1 tablet by mouth Daily., Disp: , Rfl:     ofloxacin (OCUFLOX) 0.3 % ophthalmic solution, , Disp: , Rfl:     omeprazole (priLOSEC) 20 MG capsule, Take 1 capsule by mouth Daily., Disp: , Rfl:     Selenium 200 MCG capsule, , Disp: , Rfl:     sulfaSALAzine (AZULFIDINE) 500 MG tablet, Take 2 tablets by mouth 2 (Two) Times a Day., Disp: , Rfl:     Turmeric 500 MG tablet, Take 1 tablet by mouth Daily.,  Disp: , Rfl:     Zinc 100 MG tablet, , Disp: , Rfl:   No current facility-administered medications for this visit.    The following portions of the patient's history were reviewed and updated as appropriate: allergies, current medications, past family history, past medical history, past social history, past surgical history, and problem list.      REVIEW OF PERTINENT MEDICAL DATA    Past Medical History:   Diagnosis Date    Allergic     Arthritis 20 hrs ago?    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    CTS (carpal tunnel syndrome)     Difficulty walking     Diverticulosis     Fractures 1994    Broke ankle in 17 inches of snow.    GERD (gastroesophageal reflux disease)     Hepatic hemangioma 10/2017    Seen on MRI of the liver    Hyperlipidemia     Hypertension     Irritable bowel syndrome 2015    Joint pain     Knee - shoulder - hip (replaced in 2011)    Leg pain     Low back pain     Pneumonia     Sleep apnea     Spinal stenosis 2011    Had 2 back surgeries---narrowing at L3-4 junction    Ulcerative colitis 03/2018    Followed by GI, Dr. Vanda Sotomayor; treated with sulfasalazine    Visual impairment Jan. 12, 2022    Started with double vision.  Ended up having cataracts removed and corrected to mono vision.  Having a lot of problems with jr up vision in left eye.     Past Surgical History:   Procedure Laterality Date    ANKLE SURGERY Right     broken right ankle     BACK SURGERY      BLADDER SURGERY      bladder sling     BREAST AUGMENTATION Bilateral 2015 & 11/28/2017    recurrent capsular contracture    BREAST SURGERY  1984    2013    CATARACT EXTRACTION, BILATERAL  2022    COLONOSCOPY      EPIDURAL BLOCK      Many times    EYE SURGERY      FRACTURE SURGERY  1993    INTERSTIM PLACEMENT Right 11/2020    for bladder and bowel control - [employer]    JOINT REPLACEMENT      SKIN BIOPSY  02/06/2019    Shave biopsy  of frontal scalp; irritated verruca vulgaris    SPINE SURGERY  2011    TOE SURGERY      big toe left foot     TOTAL HIP ARTHROPLASTY Right     TUBAL ABDOMINAL LIGATION Bilateral     UPPER ENDOSCOPIC ULTRASOUND W/ FNA N/A 11/27/2023    Procedure: ESOPHAGOGASTRODUODENOSCOPY WITH BIOPSY X2 AREAS AND ENDOSCOPIC ULTRASOUND WITH LIVER BIOPSY;  Surgeon: Rolando Summers MD;  Location: [facility] ENDOSCOPY;  Service: Gastroenterology;  Laterality: N/A;  POST: DUODENAL AND PERIAMPULLARY DIVERTICULUM     Family History   Problem Relation Age of Onset    Hypertension Mother     Arthritis Mother     Stroke Mother     Dementia Mother     Hypertension Father     Arthritis Father     Hyperlipidemia Father     Stroke Father     Vision loss Father     Dementia Father     Parkinsonism Father     Colon cancer Sister 43    Cancer Sister         Colon    Depression Son     Hearing loss Son     Hyperlipidemia Sister     Hypertension Sister     Hyperlipidemia Brother     Hypertension Brother      Social History     Socioeconomic History    Marital status:    Tobacco Use    Smoking status: Former     Packs/day: 1.00     Years: 30.00     Additional pack years: 0.00     Total pack years: 30.00     Types: Cigarettes     Start date: 1/1/1980     Quit date: 10/13/2013     Years since quitting: 10.2    Smokeless tobacco: Never   Vaping Use    Vaping Use: Never used   Substance and Sexual Activity    Alcohol use: Yes     Alcohol/week: 20.0 standard drinks of alcohol     Types: 20 Shots of liquor per week     Comment: bourbon- couple daily     Drug use: Never    Sexual activity: Yes     Partners: Male     Birth control/protection: Post-menopausal         Review of Systems   Musculoskeletal:  Positive for arthralgias and back pain.         Vitals:    01/17/24 1444 01/17/24 1516   BP: 148/76 148/84   Pulse: 82 78   Resp: 16 16   SpO2: 94% 93%   Weight: 77.6 kg (171 lb)    PainSc: 0-No pain            Objective   Physical Exam  Musculoskeletal:          General: Tenderness present.        Legs:            Imaging Reviewed:    MRI lumbar spine- 10/3/23-   S/p L4, L5 laminectomies  Modic type I degenerative changes  - L3-5 - mild spinal canal narrowing and moderate to severe b/l foraminal narrowing.   - L4-5 - facet arthropathy causing modecate to severe b/l neural foraminal narrowing. S/p parker  L5-S1 - facet arthropathy causing modecate to severe b/l neural foraminal narrowing. S/p parker      CT lumbar spine-7/20/2021-independently reviewed  M70-Y2-sh central stenosis or facet changes.  L3-L4 -facet arthritis. No stenosis  J0-S6-ljpzayhzl neuroforaminal narrowing. Greater than left due to spurring and facet arthritis. There may be nerve impingement especially in the right side.  R5-Z7-yvirlddws neuroforaminal narrowing secondary to prominent endplate spurring. Mild facet arthritis. No spinal stenosis.     Assessment:    1. Trochanteric bursitis of both hips    2. Lumbar radiculopathy    3. Hx of decompressive lumbar laminectomy           Plan:   1. Defer UDS for now.   2. We discussed trying a course of formal physical therapy.  Physical therapy can help strengthen and stretch the muscles around the joints. Continue to be as active as possible.   3. Good relief with caudal INGE. Repeat PRN.   4. Patient has pain in the bilateral hip area, trochanteric bursa tenderness present. Discussed bilateral trochanteric bursa injection. Discussed the possibility of infection, bleeding, nerve damage, headache, increased pain, paraplegia. Patient understands and agrees.       RTC in 3 weeks.     Uche Huizar DO  Pain Management   [facility]          INSPECT REPORT    As part of the patient's treatment plan, I may be prescribing controlled substances. The patient has been made aware of appropriate use of such medications, including potential risk of somnolence, limited ability to drive and/or work safely, and the potential for dependence or overdose. It has also been  made clear that these medications are for use by this patient only, without concomitant use of alcohol or other substances unless prescribed.     Patient has completed prescribing agreement detailing terms of continued prescribing of controlled substances, including monitoring INSPECT reports, urine drug screening, and pill counts if necessary. The patient is aware that inappropriate use will results in cessation of prescribing such medications.    INSPECT report has been reviewed and scanned into the patient's chart.

## 2024-01-17 ENCOUNTER — OFFICE VISIT (OUTPATIENT)
Dept: PAIN MEDICINE | Facility: CLINIC | Age: 74
End: 2024-01-17
Payer: MEDICARE

## 2024-01-17 VITALS
HEART RATE: 78 BPM | DIASTOLIC BLOOD PRESSURE: 84 MMHG | SYSTOLIC BLOOD PRESSURE: 148 MMHG | OXYGEN SATURATION: 93 % | RESPIRATION RATE: 16 BRPM | BODY MASS INDEX: 28.46 KG/M2 | WEIGHT: 171 LBS

## 2024-01-17 DIAGNOSIS — Z98.890 HX OF DECOMPRESSIVE LUMBAR LAMINECTOMY: ICD-10-CM

## 2024-01-17 DIAGNOSIS — M70.62 TROCHANTERIC BURSITIS OF BOTH HIPS: Primary | ICD-10-CM

## 2024-01-17 DIAGNOSIS — M54.16 LUMBAR RADICULOPATHY: ICD-10-CM

## 2024-01-17 DIAGNOSIS — M70.61 TROCHANTERIC BURSITIS OF BOTH HIPS: Primary | ICD-10-CM

## 2024-01-17 RX ORDER — BUPIVACAINE HYDROCHLORIDE 2.5 MG/ML
10 INJECTION, SOLUTION INFILTRATION; PERINEURAL ONCE
Status: COMPLETED | OUTPATIENT
Start: 2024-01-17 | End: 2024-01-17

## 2024-01-17 RX ORDER — METHYLPREDNISOLONE ACETATE 40 MG/ML
40 INJECTION, SUSPENSION INTRA-ARTICULAR; INTRALESIONAL; INTRAMUSCULAR; SOFT TISSUE ONCE
Status: COMPLETED | OUTPATIENT
Start: 2024-01-17 | End: 2024-01-17

## 2024-01-17 RX ORDER — B-COMPLEX WITH VITAMIN C
TABLET ORAL
COMMUNITY
Start: 2024-01-01

## 2024-01-17 RX ADMIN — METHYLPREDNISOLONE ACETATE 40 MG: 40 INJECTION, SUSPENSION INTRA-ARTICULAR; INTRALESIONAL; INTRAMUSCULAR; SOFT TISSUE at 15:48

## 2024-01-17 RX ADMIN — BUPIVACAINE HYDROCHLORIDE 10 ML: 2.5 INJECTION, SOLUTION INFILTRATION; PERINEURAL at 15:45

## 2024-01-17 RX ADMIN — METHYLPREDNISOLONE ACETATE 40 MG: 40 INJECTION, SUSPENSION INTRA-ARTICULAR; INTRALESIONAL; INTRAMUSCULAR; SOFT TISSUE at 15:47

## 2024-01-17 RX ADMIN — BUPIVACAINE HYDROCHLORIDE 10 ML: 2.5 INJECTION, SOLUTION INFILTRATION; PERINEURAL at 15:44

## 2024-01-17 NOTE — PROGRESS NOTES
PROCEDURE:  Bilateral Trochanteric Bursa Injection     PROCEDURE IN DETAIL:  Patient was placed in the lateral decubitus position with right side up. Two areas of maximum tenderness over the trochanteric bursa was identified, marked and then the area was prepped with antiseptic solution and draped in sterile fashion.  22 gauge 3.5 inch spinal needle was inserted through the skin until the needle touched the trochanteric bursa on the right side.  Then, after negative aspiration, 5 ml mixture of 0.25% Marcaine with 40 mg of Depo-medrol was injected. The needle was then removed.  Repeat procedure was performed on the second area of maximum tenderness on right side. Patient was turned on opposite side with left side up in lateral decubitus position. Entire procedure was repeated on left side with injection to two areas of maximum tenderness. Total 10  ml mixture of 0.25% Marcaine with 40 mg Depo-Medrol was injected on left side.     The patient tolerated the procedure well and a band-Aid was applied and the patient was taken to the recovery room in stable condition      Uche Huizar DO  Pain Management   [facility]

## 2024-01-21 DIAGNOSIS — I10 ESSENTIAL HYPERTENSION: ICD-10-CM

## 2024-01-22 RX ORDER — AMLODIPINE BESYLATE 10 MG/1
10 TABLET ORAL
Qty: 90 TABLET | Refills: 1 | Status: SHIPPED | OUTPATIENT
Start: 2024-01-22

## 2024-02-15 ENCOUNTER — OFFICE VISIT (OUTPATIENT)
Dept: PAIN MEDICINE | Facility: CLINIC | Age: 74
End: 2024-02-15
Payer: MEDICARE

## 2024-02-15 VITALS
HEART RATE: 85 BPM | DIASTOLIC BLOOD PRESSURE: 51 MMHG | OXYGEN SATURATION: 94 % | WEIGHT: 170.4 LBS | BODY MASS INDEX: 28.36 KG/M2 | SYSTOLIC BLOOD PRESSURE: 139 MMHG | RESPIRATION RATE: 16 BRPM

## 2024-02-15 DIAGNOSIS — M70.62 TROCHANTERIC BURSITIS OF BOTH HIPS: ICD-10-CM

## 2024-02-15 DIAGNOSIS — M54.16 LUMBAR RADICULOPATHY: Primary | ICD-10-CM

## 2024-02-15 DIAGNOSIS — Z98.890 HX OF DECOMPRESSIVE LUMBAR LAMINECTOMY: ICD-10-CM

## 2024-02-15 DIAGNOSIS — M70.61 TROCHANTERIC BURSITIS OF BOTH HIPS: ICD-10-CM

## 2024-02-15 RX ORDER — GABAPENTIN 300 MG/1
300 CAPSULE ORAL 3 TIMES DAILY
Qty: 90 CAPSULE | Refills: 0 | Status: SHIPPED | OUTPATIENT
Start: 2024-02-15 | End: 2024-03-16

## 2024-03-04 DIAGNOSIS — I10 ESSENTIAL HYPERTENSION: ICD-10-CM

## 2024-03-04 RX ORDER — LISINOPRIL AND HYDROCHLOROTHIAZIDE 20; 12.5 MG/1; MG/1
TABLET ORAL
Qty: 180 TABLET | Refills: 1 | Status: SHIPPED | OUTPATIENT
Start: 2024-03-04

## 2024-03-12 NOTE — PROGRESS NOTES
Subjective   Meliza Red is a 74 y.o. female is here for follow up for lower back pain.  Last seen 1 month ago.    On last visit: Started gabapentin- helps with pain significantly.     Lower back pain is 0/10 on VAS and at worse 3/10 on VAS at bed time. Pain is dull, achy in nature. Pain is referred left buttock, lateral left thigh and left leg. Intermittent numbness and tingling- mostly resolved after caudal INGE. The pain is constant. The pain is improved by meloxicam and caudal INGE. The pain is worse with laying on left side, walking, standing for long time.     B/l hip/ trochanter bursa pain is 2/10 on VAS. Intermittent, only when she lays down on it at night time.     Previous Injection:   1/17/24 - b/l troch bursa injection -  90% pain relief for 1 week. LLE pain resolved as well.  12/1/2023-caudal INGE- 100% pain relief with functional improvement.   9/20/2021-caudal INGE- 100% pain relief for 1.5 years.  8/7/2021-caudal INGE-80% pain relief for 1 month.     PHQ-9- 4  SOAPP- 5     PMH:   Back surgeries x2 (2011) - lumbar disectomy/ decompression L3 -S1; laminectomies L4-5, L5-S1, R hip arthroplasty - 2011,  hypertension, tobacco use, left knee OA, bladder surgery-InterStim, GERD, ulcerative colitis.         Current Medications:   Meloxicam 15 daily PRN  Citalopram      Hx: referred by Dr. Fermin, Ana Harrington MD.  Her pain started about 2 months ago and has been getting worse since then.      Past Medications:   Diclofenac 50 mg twice daily as needed\    Past Modalities:  TENS:                                                                          YES                                               Physical Therapy Within The Last 6 Months              no  Psychotherapy                                                            no  Massage Therapy                                                       no     Patient Complains Of:  Uro-Fecal Incontinence          No (chronic urinary continence)   Weight  Gain/Loss                   no  Fever/Chills                             no  Weakness                               no       Current Outpatient Medications:     amLODIPine (NORVASC) 10 MG tablet, TAKE 1 TABLET BY MOUTH DAILY WITH DINNER, Disp: 90 tablet, Rfl: 1    Apremilast (Otezla) 10 & 20 & 30 MG tablet therapy pack, , Disp: , Rfl:     atorvastatin (LIPITOR) 20 MG tablet, TAKE ONE TABLET BY MOUTH ONCE NIGHTLY, Disp: 90 tablet, Rfl: 0    Bromfenac Sodium (Prolensa) 0.07 % solution, Apply 1 drop to eye(s) as directed by provider Every 12 (Twelve) Hours., Disp: , Rfl:     Calcium Carbonate (CALCIUM 600 PO), Take  by mouth., Disp: , Rfl:     carvedilol (COREG) 25 MG tablet, TAKE TWO TABLETS BY MOUTH TWICE A DAY WITH MEALS, Disp: 360 tablet, Rfl: 1    Cholecalciferol (VITAMIN D3) 125 MCG (5000 UT) tablet tablet, Take 1 tablet by mouth., Disp: , Rfl:     colestipol (COLESTID) 1 g tablet, , Disp: , Rfl:     difluprednate (DUREZOL) 0.05 % ophthalmic emulsion, Apply 1 drop to eye(s) as directed by provider Every 6 (Six) Hours., Disp: , Rfl:     escitalopram (Lexapro) 10 MG tablet, Take 1 tablet by mouth Daily., Disp: 90 tablet, Rfl: 3    folic acid (FOLVITE) 1 MG tablet, Take 1 tablet by mouth Daily., Disp: , Rfl:     gabapentin (NEURONTIN) 300 MG capsule, Take 1 capsule by mouth 3 (Three) Times a Day for 90 days., Disp: 270 capsule, Rfl: 0    KRILL OIL PO, Take  by mouth., Disp: , Rfl:     lisinopril-hydrochlorothiazide (PRINZIDE,ZESTORETIC) 20-12.5 MG per tablet, TAKE 2 TABLETS BY MOUTHQD WITH BREAKFAST, Disp: 180 tablet, Rfl: 1    meloxicam (MOBIC) 15 MG tablet, Take 1 tablet by mouth Daily., Disp: 90 tablet, Rfl: 3    Milk Thistle 175 MG capsule, , Disp: , Rfl:     multivitamin with minerals tablet tablet, Take 1 tablet by mouth Daily., Disp: , Rfl:     ofloxacin (OCUFLOX) 0.3 % ophthalmic solution, , Disp: , Rfl:     omeprazole (priLOSEC) 20 MG capsule, Take 1 capsule by mouth Daily., Disp: , Rfl:     Selenium 200  MCG capsule, , Disp: , Rfl:     sulfaSALAzine (AZULFIDINE) 500 MG tablet, Take 2 tablets by mouth 2 (Two) Times a Day., Disp: , Rfl:     Turmeric 500 MG tablet, Take 1 tablet by mouth Daily., Disp: , Rfl:     Zinc 100 MG tablet, , Disp: , Rfl:     The following portions of the patient's history were reviewed and updated as appropriate: allergies, current medications, past family history, past medical history, past social history, past surgical history, and problem list.      REVIEW OF PERTINENT MEDICAL DATA    Past Medical History:   Diagnosis Date    Allergic     Arthritis 20 hrs ago?    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    CTS (carpal tunnel syndrome)     Difficulty walking     Diverticulosis     Fractures 1994    Broke ankle in 17 inches of snow.    GERD (gastroesophageal reflux disease)     Hepatic hemangioma 10/2017    Seen on MRI of the liver    Hyperlipidemia     Hypertension     Irritable bowel syndrome 2015    Joint pain     Knee - shoulder - hip (replaced in 2011)    Leg pain     Low back pain     Pneumonia     Sleep apnea     Spinal stenosis 2011    Had 2 back surgeries---narrowing at L3-4 junction    Ulcerative colitis 03/2018    Followed by GI, Dr. Vanda Sotomayor; treated with sulfasalazine    Visual impairment Jan. 12, 2022    Started with double vision.  Ended up having cataracts removed and corrected to mono vision.  Having a lot of problems with jr up vision in left eye.     Past Surgical History:   Procedure Laterality Date    ANKLE SURGERY Right     broken right ankle     BACK SURGERY      BLADDER SURGERY      bladder sling     BREAST AUGMENTATION Bilateral 2015 & 11/28/2017    recurrent capsular contracture    BREAST SURGERY  1984    2013    CATARACT EXTRACTION, BILATERAL  2022    COLONOSCOPY      EPIDURAL BLOCK      Many times    EYE SURGERY      FRACTURE SURGERY  1993    INTERSTI  PLACEMENT Right 11/2020    for bladder and bowel control - [employer]    JOINT REPLACEMENT      SKIN BIOPSY  02/06/2019    Shave biopsy of frontal scalp; irritated verruca vulgaris    SPINE SURGERY  2011    TOE SURGERY      big toe left foot     TOTAL HIP ARTHROPLASTY Right     TUBAL ABDOMINAL LIGATION Bilateral     UPPER ENDOSCOPIC ULTRASOUND W/ FNA N/A 11/27/2023    Procedure: ESOPHAGOGASTRODUODENOSCOPY WITH BIOPSY X2 AREAS AND ENDOSCOPIC ULTRASOUND WITH LIVER BIOPSY;  Surgeon: Rolando Summers MD;  Location: [facility] ENDOSCOPY;  Service: Gastroenterology;  Laterality: N/A;  POST: DUODENAL AND PERIAMPULLARY DIVERTICULUM     Family History   Problem Relation Age of Onset    Hypertension Mother     Arthritis Mother     Stroke Mother     Dementia Mother     Hypertension Father     Arthritis Father     Hyperlipidemia Father     Stroke Father     Vision loss Father     Dementia Father     Parkinsonism Father     Colon cancer Sister 43    Cancer Sister         Colon    Depression Son     Hearing loss Son     Hyperlipidemia Sister     Hypertension Sister     Hyperlipidemia Brother     Hypertension Brother      Social History     Socioeconomic History    Marital status:    Tobacco Use    Smoking status: Former     Current packs/day: 0.00     Average packs/day: 1 pack/day for 33.8 years (33.8 ttl pk-yrs)     Types: Cigarettes     Start date: 1/1/1980     Quit date: 10/13/2013     Years since quitting: 10.4    Smokeless tobacco: Never   Vaping Use    Vaping status: Never Used   Substance and Sexual Activity    Alcohol use: Yes     Alcohol/week: 20.0 standard drinks of alcohol     Types: 20 Shots of liquor per week     Comment: bourbon- couple daily     Drug use: Never    Sexual activity: Yes     Partners: Male     Birth control/protection: Post-menopausal         Review of Systems   Musculoskeletal:  Positive for arthralgias and back pain.         Vitals:    03/14/24 1531   BP: 132/72   Pulse: 84   Resp: 16   SpO2: 93%    Weight: 78 kg (172 lb)   PainSc: 0-No pain               Objective   Physical Exam  Musculoskeletal:         General: Tenderness present.        Legs:            Imaging Reviewed:    MRI lumbar spine- 10/3/23-   S/p L4, L5 laminectomies  Modic type I degenerative changes  - L3-5 - mild spinal canal narrowing and moderate to severe b/l foraminal narrowing.   - L4-5 - facet arthropathy causing modecate to severe b/l neural foraminal narrowing. S/p parker  L5-S1 - facet arthropathy causing modecate to severe b/l neural foraminal narrowing. S/p parker      CT lumbar spine-7/20/2021-independently reviewed  V67-B4-ab central stenosis or facet changes.  L3-L4 -facet arthritis. No stenosis  K0-T3-shctufspl neuroforaminal narrowing. Greater than left due to spurring and facet arthritis. There may be nerve impingement especially in the right side.  G6-D8-eotkasmpa neuroforaminal narrowing secondary to prominent endplate spurring. Mild facet arthritis. No spinal stenosis.     Assessment:    1. Lumbar radiculopathy    2. Trochanteric bursitis of both hips    3. Hx of decompressive lumbar laminectomy        Plan:   1. Defer UDS for now.   2. We discussed trying a course of formal physical therapy.  Physical therapy can help strengthen and stretch the muscles around the joints. Continue to be as active as possible.   3. Good relief with caudal INGE. Repeat PRN.   4. Continue gabapentin 300 mg TID (6/12/24). Side effects discussed with the patient including but not limited to somnolence, dizziness, ataxia, headache, fatigue, blurred vision, cold or flu-like symptoms,delusions, hoarseness, lack or loss of strength, lower back or side pain, swelling of the hands, feet, or lower legs trembling or shaking. Patient understands and agrees with the plan.  5. Voltaren gel 1% for b/l troch bursa.       RTC in 3 months.     Uche Huizar DO  Pain Management   [facility]    As part of the patient's treatment plan,  I may be prescribing controlled substances. The patient has been made aware of appropriate use of such medications, including potential risk of somnolence, limited ability to drive and/or work safely, and the potential for dependence or overdose. It has also been made clear that these medications are for use by this patient only, without concomitant use of alcohol or other substances unless prescribed.     Patient has completed prescribing agreement detailing terms of continued prescribing of controlled substances, including monitoring INSPECT reports, urine drug screening, and pill counts if necessary. The patient is aware that inappropriate use will results in cessation of prescribing such medications.    INSPECT report has been reviewed and scanned into the patient's chart.

## 2024-03-14 ENCOUNTER — OFFICE VISIT (OUTPATIENT)
Dept: PAIN MEDICINE | Facility: CLINIC | Age: 74
End: 2024-03-14
Payer: MEDICARE

## 2024-03-14 VITALS
HEART RATE: 84 BPM | RESPIRATION RATE: 16 BRPM | WEIGHT: 172 LBS | SYSTOLIC BLOOD PRESSURE: 132 MMHG | OXYGEN SATURATION: 93 % | DIASTOLIC BLOOD PRESSURE: 72 MMHG | BODY MASS INDEX: 28.62 KG/M2

## 2024-03-14 DIAGNOSIS — M70.61 TROCHANTERIC BURSITIS OF BOTH HIPS: ICD-10-CM

## 2024-03-14 DIAGNOSIS — Z98.890 HX OF DECOMPRESSIVE LUMBAR LAMINECTOMY: ICD-10-CM

## 2024-03-14 DIAGNOSIS — M70.62 TROCHANTERIC BURSITIS OF BOTH HIPS: ICD-10-CM

## 2024-03-14 DIAGNOSIS — M54.16 LUMBAR RADICULOPATHY: ICD-10-CM

## 2024-03-14 RX ORDER — GABAPENTIN 300 MG/1
300 CAPSULE ORAL 3 TIMES DAILY
Qty: 270 CAPSULE | Refills: 0 | Status: SHIPPED | OUTPATIENT
Start: 2024-03-14 | End: 2024-06-12

## 2024-03-16 DIAGNOSIS — M70.62 TROCHANTERIC BURSITIS OF BOTH HIPS: ICD-10-CM

## 2024-03-16 DIAGNOSIS — Z98.890 HX OF DECOMPRESSIVE LUMBAR LAMINECTOMY: ICD-10-CM

## 2024-03-16 DIAGNOSIS — M54.16 LUMBAR RADICULOPATHY: ICD-10-CM

## 2024-03-16 DIAGNOSIS — M70.61 TROCHANTERIC BURSITIS OF BOTH HIPS: ICD-10-CM

## 2024-03-18 RX ORDER — GABAPENTIN 300 MG/1
300 CAPSULE ORAL 3 TIMES DAILY
Qty: 90 CAPSULE | OUTPATIENT
Start: 2024-03-18

## 2024-03-30 DIAGNOSIS — F41.9 ANXIETY: ICD-10-CM

## 2024-03-30 RX ORDER — ESCITALOPRAM OXALATE 10 MG/1
10 TABLET ORAL DAILY
Qty: 90 TABLET | Refills: 3 | Status: SHIPPED | OUTPATIENT
Start: 2024-03-30

## 2024-04-06 DIAGNOSIS — E78.2 MIXED HYPERLIPIDEMIA: ICD-10-CM

## 2024-04-07 RX ORDER — ATORVASTATIN CALCIUM 20 MG/1
TABLET, FILM COATED ORAL
Qty: 90 TABLET | Refills: 0 | Status: SHIPPED | OUTPATIENT
Start: 2024-04-07

## 2024-04-08 ENCOUNTER — OFFICE (AMBULATORY)
Dept: URBAN - METROPOLITAN AREA CLINIC 64 | Facility: CLINIC | Age: 74
End: 2024-04-08

## 2024-04-08 VITALS
HEIGHT: 65 IN | DIASTOLIC BLOOD PRESSURE: 62 MMHG | SYSTOLIC BLOOD PRESSURE: 118 MMHG | HEART RATE: 78 BPM | WEIGHT: 176 LBS

## 2024-04-08 DIAGNOSIS — K75.81 NONALCOHOLIC STEATOHEPATITIS (NASH): ICD-10-CM

## 2024-04-08 DIAGNOSIS — K21.9 GASTRO-ESOPHAGEAL REFLUX DISEASE WITHOUT ESOPHAGITIS: ICD-10-CM

## 2024-04-08 DIAGNOSIS — K44.9 DIAPHRAGMATIC HERNIA WITHOUT OBSTRUCTION OR GANGRENE: ICD-10-CM

## 2024-04-08 DIAGNOSIS — R15.9 FULL INCONTINENCE OF FECES: ICD-10-CM

## 2024-04-08 PROCEDURE — 99214 OFFICE O/P EST MOD 30 MIN: CPT | Performed by: INTERNAL MEDICINE

## 2024-04-09 ENCOUNTER — LAB (OUTPATIENT)
Dept: FAMILY MEDICINE CLINIC | Facility: CLINIC | Age: 74
End: 2024-04-09
Payer: MEDICARE

## 2024-04-09 ENCOUNTER — OFFICE VISIT (OUTPATIENT)
Dept: FAMILY MEDICINE CLINIC | Facility: CLINIC | Age: 74
End: 2024-04-09
Payer: MEDICARE

## 2024-04-09 VITALS
DIASTOLIC BLOOD PRESSURE: 74 MMHG | HEART RATE: 66 BPM | OXYGEN SATURATION: 94 % | HEIGHT: 65 IN | WEIGHT: 177 LBS | BODY MASS INDEX: 29.49 KG/M2 | SYSTOLIC BLOOD PRESSURE: 120 MMHG

## 2024-04-09 DIAGNOSIS — F41.9 ANXIETY: ICD-10-CM

## 2024-04-09 DIAGNOSIS — I10 ESSENTIAL HYPERTENSION: Primary | ICD-10-CM

## 2024-04-09 DIAGNOSIS — E78.2 MIXED HYPERLIPIDEMIA: ICD-10-CM

## 2024-04-09 DIAGNOSIS — I10 ESSENTIAL HYPERTENSION: ICD-10-CM

## 2024-04-09 LAB
CHOLEST SERPL-MCNC: 184 MG/DL (ref 0–200)
HDLC SERPL-MCNC: 74 MG/DL (ref 40–60)
LDLC SERPL CALC-MCNC: 84 MG/DL (ref 0–100)
LDLC/HDLC SERPL: 1.08 {RATIO}
TRIGL SERPL-MCNC: 152 MG/DL (ref 0–150)
VLDLC SERPL-MCNC: 26 MG/DL (ref 5–40)

## 2024-04-09 PROCEDURE — 1159F MED LIST DOCD IN RCRD: CPT | Performed by: NURSE PRACTITIONER

## 2024-04-09 PROCEDURE — 1160F RVW MEDS BY RX/DR IN RCRD: CPT | Performed by: NURSE PRACTITIONER

## 2024-04-09 PROCEDURE — G2211 COMPLEX E/M VISIT ADD ON: HCPCS | Performed by: NURSE PRACTITIONER

## 2024-04-09 PROCEDURE — 3074F SYST BP LT 130 MM HG: CPT | Performed by: NURSE PRACTITIONER

## 2024-04-09 PROCEDURE — 3078F DIAST BP <80 MM HG: CPT | Performed by: NURSE PRACTITIONER

## 2024-04-09 PROCEDURE — 99214 OFFICE O/P EST MOD 30 MIN: CPT | Performed by: NURSE PRACTITIONER

## 2024-04-09 PROCEDURE — 36415 COLL VENOUS BLD VENIPUNCTURE: CPT

## 2024-04-09 PROCEDURE — 80061 LIPID PANEL: CPT | Performed by: NURSE PRACTITIONER

## 2024-04-22 DIAGNOSIS — M70.62 TROCHANTERIC BURSITIS OF BOTH HIPS: ICD-10-CM

## 2024-04-22 DIAGNOSIS — M70.61 TROCHANTERIC BURSITIS OF BOTH HIPS: ICD-10-CM

## 2024-04-22 DIAGNOSIS — Z98.890 HX OF DECOMPRESSIVE LUMBAR LAMINECTOMY: ICD-10-CM

## 2024-04-22 DIAGNOSIS — M54.16 LUMBAR RADICULOPATHY: ICD-10-CM

## 2024-04-22 RX ORDER — GABAPENTIN 300 MG/1
300 CAPSULE ORAL 3 TIMES DAILY
Qty: 270 CAPSULE | Refills: 0 | Status: SHIPPED | OUTPATIENT
Start: 2024-04-22

## 2024-06-13 NOTE — PROGRESS NOTES
Subjective   eMliza Red is a 74 y.o. female is here for follow up for lower back pain.  Last seen on 3/14/2024.  This Is a 3 months follow-up. She is still unable to walk for prolonged distances.     On last visit: Gabapentin helps with pain.     Lower back pain is 1/10 on VAS and at worse 9/10 on VAS at bed time. Pain is dull, achy in nature. Pain is referred left buttock, lateral left thigh and left leg. Intermittent numbness and tingling- mostly resolved after caudal INGE. The pain is constant. The pain is improved by meloxicam and caudal INGE but still has trouble walking for long distances. The pain is worse with walking.  Unable to walk for prolonged distance which would improve with bending forward.  + Shopping cart sign    B/l hip/ trochanter bursa pain is 2/10 on VAS. Intermittent, only when she lays down on it at night time.     Previous Injection:   1/17/24 - b/l troch bursa injection -  90% pain relief for 1 week. LLE pain resolved as well.  12/1/2023-caudal INGE- 100% pain relief with functional improvement.  Still had trouble walking for prolonged period of time.  9/20/2021-caudal INGE- 100% pain relief for 1.5 years.  8/7/2021-caudal INGE-80% pain relief for 1 month.     PHQ-9- 4  SOAPP- 5     PMH:   Back surgeries x2 (2011) - lumbar disectomy/ decompression L3 -S1; laminectomies L4-5, L5-S1, R hip arthroplasty - 2011,  hypertension, tobacco use, left knee OA, bladder surgery-InterStim, GERD, ulcerative colitis.         Current Medications:   Meloxicam 15 daily PRN  Citalopram      Hx: referred by Dr. Fermin, Ana Harrington MD.  Her pain started about 2 months ago and has been getting worse since then.      Past Medications:   Diclofenac 50 mg twice daily as needed\    Past Modalities:  TENS:                                                                          YES                                               Physical Therapy Within The Last 6 Months              no  Psychotherapy                                                             no  Massage Therapy                                                       no     Patient Complains Of:  Uro-Fecal Incontinence          No (chronic urinary continence)   Weight Gain/Loss                   no  Fever/Chills                             no  Weakness                               no       Current Outpatient Medications:     amLODIPine (NORVASC) 10 MG tablet, TAKE 1 TABLET BY MOUTH DAILY WITH DINNER, Disp: 90 tablet, Rfl: 1    atorvastatin (LIPITOR) 20 MG tablet, TAKE ONE TABLET BY MOUTH ONCE NIGHTLY, Disp: 90 tablet, Rfl: 0    Bromfenac Sodium (Prolensa) 0.07 % solution, Apply 1 drop to eye(s) as directed by provider Every 12 (Twelve) Hours., Disp: , Rfl:     Calcium Carbonate (CALCIUM 600 PO), Take  by mouth., Disp: , Rfl:     carvedilol (COREG) 25 MG tablet, TAKE TWO TABLETS BY MOUTH TWICE A DAY WITH MEALS, Disp: 360 tablet, Rfl: 1    Cholecalciferol (VITAMIN D3) 125 MCG (5000 UT) tablet tablet, Take 1 tablet by mouth., Disp: , Rfl:     colestipol (COLESTID) 1 g tablet, , Disp: , Rfl:     difluprednate (DUREZOL) 0.05 % ophthalmic emulsion, Apply 1 drop to eye(s) as directed by provider Every 6 (Six) Hours., Disp: , Rfl:     escitalopram (LEXAPRO) 10 MG tablet, TAKE ONE TABLET BY MOUTH DAILY, Disp: 90 tablet, Rfl: 3    folic acid (FOLVITE) 1 MG tablet, Take 1 tablet by mouth Daily., Disp: , Rfl:     gabapentin (NEURONTIN) 300 MG capsule, Take 1 capsule by mouth 3 (Three) Times a Day., Disp: 270 capsule, Rfl: 0    KRILL OIL PO, Take  by mouth., Disp: , Rfl:     lisinopril-hydrochlorothiazide (PRINZIDE,ZESTORETIC) 20-12.5 MG per tablet, TAKE 2 TABLETS BY MOUTHQD WITH BREAKFAST, Disp: 180 tablet, Rfl: 1    Milk Thistle 175 MG capsule, , Disp: , Rfl:     multivitamin with minerals tablet tablet, Take 1 tablet by mouth Daily., Disp: , Rfl:     omeprazole (priLOSEC) 20 MG capsule, Take 1 capsule by mouth Daily., Disp: , Rfl:     Selenium 200 MCG capsule, , Disp: ,  Rfl:     sulfaSALAzine (AZULFIDINE) 500 MG tablet, Take 2 tablets by mouth 2 (Two) Times a Day., Disp: , Rfl:     Turmeric 500 MG tablet, Take 1 tablet by mouth Daily., Disp: , Rfl:     Zinc 100 MG tablet, , Disp: , Rfl:     Risankizumab-rzaa (SKYRIZI SC), Inject  under the skin into the appropriate area as directed., Disp: , Rfl:     The following portions of the patient's history were reviewed and updated as appropriate: allergies, current medications, past family history, past medical history, past social history, past surgical history, and problem list.      REVIEW OF PERTINENT MEDICAL DATA    Past Medical History:   Diagnosis Date    Allergic     Arthritis 20 hrs ago?    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    CTS (carpal tunnel syndrome)     Difficulty walking     Diverticulosis     Fractures 1994    Broke ankle in 17 inches of snow.    GERD (gastroesophageal reflux disease)     Hepatic hemangioma 10/2017    Seen on MRI of the liver    Hyperlipidemia     Hypertension     Irritable bowel syndrome 2015    Joint pain     Knee - shoulder - hip (replaced in 2011)    Leg pain     Low back pain     Pneumonia     Sleep apnea     Spinal stenosis 2011    Had 2 back surgeries---narrowing at L3-4 junction    Ulcerative colitis 03/2018    Followed by GI, Dr. Vanda Sotomayor; treated with sulfasalazine    Visual impairment Jan. 12, 2022    Started with double vision.  Ended up having cataracts removed and corrected to mono vision.  Having a lot of problems with jr up vision in left eye.     Past Surgical History:   Procedure Laterality Date    ANKLE SURGERY Right     broken right ankle     BACK SURGERY      BLADDER SURGERY      bladder sling     BREAST AUGMENTATION Bilateral 2015 & 11/28/2017    recurrent capsular contracture    BREAST SURGERY  1984    2013    CATARACT EXTRACTION, BILATERAL  2022    COLONOSCOPY       EPIDURAL BLOCK      Many times    EYE SURGERY      FRACTURE SURGERY  1993    INTERSTIM PLACEMENT Right 11/2020    for bladder and bowel control - [employer]    JOINT REPLACEMENT      ORTHOPEDIC SURGERY  2011    Hip replacement    SKIN BIOPSY  02/06/2019    Shave biopsy of frontal scalp; irritated verruca vulgaris    SPINE SURGERY  2011    TOE SURGERY      big toe left foot     TOTAL HIP ARTHROPLASTY Right     TUBAL ABDOMINAL LIGATION Bilateral     UPPER ENDOSCOPIC ULTRASOUND W/ FNA N/A 11/27/2023    Procedure: ESOPHAGOGASTRODUODENOSCOPY WITH BIOPSY X2 AREAS AND ENDOSCOPIC ULTRASOUND WITH LIVER BIOPSY;  Surgeon: Rolando Summers MD;  Location: [facility] ENDOSCOPY;  Service: Gastroenterology;  Laterality: N/A;  POST: DUODENAL AND PERIAMPULLARY DIVERTICULUM     Family History   Problem Relation Age of Onset    Hypertension Mother     Arthritis Mother     Stroke Mother     Dementia Mother     Hypertension Father     Arthritis Father     Hyperlipidemia Father     Stroke Father     Vision loss Father     Dementia Father     Parkinsonism Father     Colon cancer Sister 43    Cancer Sister         Colon    Depression Son     Hearing loss Son     Hyperlipidemia Sister     Hypertension Sister     Hyperlipidemia Brother     Hypertension Brother      Social History     Socioeconomic History    Marital status:    Tobacco Use    Smoking status: Former     Current packs/day: 0.00     Average packs/day: 1 pack/day for 33.8 years (33.8 ttl pk-yrs)     Types: Cigarettes     Start date: 1/1/1980     Quit date: 10/13/2013     Years since quitting: 10.6    Smokeless tobacco: Never   Vaping Use    Vaping status: Never Used   Substance and Sexual Activity    Alcohol use: Yes     Alcohol/week: 20.0 standard drinks of alcohol     Types: 20 Shots of liquor per week     Comment: bourbon- couple daily     Drug use: Never    Sexual activity: Yes     Partners: Male     Birth control/protection: Post-menopausal         Review of Systems    Musculoskeletal:  Positive for arthralgias and back pain.         Vitals:    06/17/24 1440   BP: 140/64   Pulse: 77   Resp: 16   SpO2: 92%   PainSc: 0-No pain                 Objective   Physical Exam  Musculoskeletal:         General: Tenderness present.        Legs:            Imaging Reviewed:    MRI lumbar spine- 10/3/23-   S/p L4, L5 laminectomies  Modic type I degenerative changes  - L3-5 - mild spinal canal narrowing and moderate to severe b/l foraminal narrowing.   - L4-5 - facet arthropathy causing modecate to severe b/l neural foraminal narrowing. S/p parker  L5-S1 - facet arthropathy causing modecate to severe b/l neural foraminal narrowing. S/p parker      CT lumbar spine-7/20/2021-independently reviewed  [id number] central stenosis or facet changes.  L3-L4 -facet arthritis. No stenosis  B9-X6-cedozzbwz neuroforaminal narrowing. Greater than left due to spurring and facet arthritis. There may be nerve impingement especially in the right side.  A7-F8-yvvnefzqm neuroforaminal narrowing secondary to prominent endplate spurring. Mild facet arthritis. No spinal stenosis.     Assessment:    1. Post laminectomy syndrome    2. Lumbar radiculopathy    3. Trochanteric bursitis of both hips    4. Hx of decompressive lumbar laminectomy        Plan:   1. Defer UDS for now.   2. We discussed trying a course of formal physical therapy.  Physical therapy can help strengthen and stretch the muscles around the joints. Continue to be as active as possible.   3. Good relief with caudal INGE. Repeat PRN.   4. Continue gabapentin 300 mg BID (9/16/24). Side effects discussed with the patient including but not limited to somnolence, dizziness, ataxia, headache, fatigue, blurred vision, cold or flu-like symptoms,delusions, hoarseness, lack or loss of strength, lower back or side pain, swelling of the hands, feet, or lower legs trembling or shaking. Patient understands and agrees with the plan.  5. Voltaren gel 1% for b/l troch  bursa.   6 Patient has pain in the lower back with referred pain in the legs. He had multiple procedures and back surgery in the past with no improvement in pain. He is not a candidate for repeat surgery and has failed more than 6 months of conservative therapy and there are no contraindications for SCS trial. So he is good candidate for SCS trial. [employer]. Psych eval sent- 6/17/24.     RTC for SCS trial.      Uche Huizar DO  Pain Management   [facility]          INSPECT REPORT    As part of the patient's treatment plan, I may be prescribing controlled substances. The patient has been made aware of appropriate use of such medications, including potential risk of somnolence, limited ability to drive and/or work safely, and the potential for dependence or overdose. It has also been made clear that these medications are for use by this patient only, without concomitant use of alcohol or other substances unless prescribed.     Patient has completed prescribing agreement detailing terms of continued prescribing of controlled substances, including monitoring INSPECT reports, urine drug screening, and pill counts if necessary. The patient is aware that inappropriate use will results in cessation of prescribing such medications.    INSPECT report has been reviewed and scanned into the patient's chart.

## 2024-06-17 ENCOUNTER — OFFICE VISIT (OUTPATIENT)
Dept: PAIN MEDICINE | Facility: CLINIC | Age: 74
End: 2024-06-17
Payer: MEDICARE

## 2024-06-17 VITALS
DIASTOLIC BLOOD PRESSURE: 64 MMHG | SYSTOLIC BLOOD PRESSURE: 140 MMHG | RESPIRATION RATE: 16 BRPM | OXYGEN SATURATION: 92 % | HEART RATE: 77 BPM

## 2024-06-17 DIAGNOSIS — M70.61 TROCHANTERIC BURSITIS OF BOTH HIPS: ICD-10-CM

## 2024-06-17 DIAGNOSIS — Z98.890 HX OF DECOMPRESSIVE LUMBAR LAMINECTOMY: ICD-10-CM

## 2024-06-17 DIAGNOSIS — M96.1 POST LAMINECTOMY SYNDROME: Primary | ICD-10-CM

## 2024-06-17 DIAGNOSIS — M70.62 TROCHANTERIC BURSITIS OF BOTH HIPS: ICD-10-CM

## 2024-06-17 DIAGNOSIS — M54.16 LUMBAR RADICULOPATHY: ICD-10-CM

## 2024-06-17 PROCEDURE — 99214 OFFICE O/P EST MOD 30 MIN: CPT | Performed by: STUDENT IN AN ORGANIZED HEALTH CARE EDUCATION/TRAINING PROGRAM

## 2024-06-17 PROCEDURE — 1159F MED LIST DOCD IN RCRD: CPT | Performed by: STUDENT IN AN ORGANIZED HEALTH CARE EDUCATION/TRAINING PROGRAM

## 2024-06-17 PROCEDURE — 1160F RVW MEDS BY RX/DR IN RCRD: CPT | Performed by: STUDENT IN AN ORGANIZED HEALTH CARE EDUCATION/TRAINING PROGRAM

## 2024-06-17 PROCEDURE — 3078F DIAST BP <80 MM HG: CPT | Performed by: STUDENT IN AN ORGANIZED HEALTH CARE EDUCATION/TRAINING PROGRAM

## 2024-06-17 PROCEDURE — 3077F SYST BP >= 140 MM HG: CPT | Performed by: STUDENT IN AN ORGANIZED HEALTH CARE EDUCATION/TRAINING PROGRAM

## 2024-06-17 PROCEDURE — 1126F AMNT PAIN NOTED NONE PRSNT: CPT | Performed by: STUDENT IN AN ORGANIZED HEALTH CARE EDUCATION/TRAINING PROGRAM

## 2024-06-17 RX ORDER — GABAPENTIN 300 MG/1
300 CAPSULE ORAL 3 TIMES DAILY
Qty: 270 CAPSULE | Refills: 0 | Status: SHIPPED | OUTPATIENT
Start: 2024-06-17

## 2024-06-22 DIAGNOSIS — I10 ESSENTIAL HYPERTENSION: ICD-10-CM

## 2024-06-22 RX ORDER — CARVEDILOL 25 MG/1
TABLET ORAL
Qty: 360 TABLET | Refills: 1 | Status: SHIPPED | OUTPATIENT
Start: 2024-06-22

## 2024-07-04 DIAGNOSIS — E78.2 MIXED HYPERLIPIDEMIA: ICD-10-CM

## 2024-07-04 RX ORDER — ATORVASTATIN CALCIUM 20 MG/1
TABLET, FILM COATED ORAL
Qty: 90 TABLET | Refills: 0 | Status: SHIPPED | OUTPATIENT
Start: 2024-07-04

## 2024-07-18 DIAGNOSIS — I10 ESSENTIAL HYPERTENSION: ICD-10-CM

## 2024-07-18 RX ORDER — AMLODIPINE BESYLATE 10 MG/1
10 TABLET ORAL
Qty: 90 TABLET | Refills: 1 | Status: SHIPPED | OUTPATIENT
Start: 2024-07-18

## 2024-08-29 DIAGNOSIS — I10 ESSENTIAL HYPERTENSION: ICD-10-CM

## 2024-08-29 RX ORDER — LISINOPRIL AND HYDROCHLOROTHIAZIDE 12.5; 2 MG/1; MG/1
2 TABLET ORAL
Qty: 180 TABLET | Refills: 1 | Status: SHIPPED | OUTPATIENT
Start: 2024-08-29

## 2024-09-03 NOTE — PROGRESS NOTES
Subjective   Meliza Red is a 74 y.o. female is here for follow up for lower back pain.  Patient was last seen on 6/17/2024.  Here to discuss SCS.  Patient has increased bilateral trochanteric bursa pain where she has trouble walking for prolonged period time.  Patient tells me that she had an MRI done at prior radiology since last visit but was unable to find it.    On last visit:     Lower back pain is 1/10 on VAS and at worse 9/10 on VAS at bed time. Pain is dull, achy in nature. Pain is referred left buttock, lateral left thigh and left leg. Intermittent numbness and tingling- mostly resolved after caudal INGE. The pain is constant. The pain is improved by meloxicam and caudal INGE but still has trouble walking for long distances. The pain is worse with walking.  Unable to walk for prolonged distance which would improve with bending forward.  + Shopping cart sign    B/l hip/ trochanter bursa pain is 2/10 on VAS. Intermittent, only when she lays down on it at night time.     Previous Injection:   1/17/24 - b/l troch bursa injection -  90% pain relief for 6 months.  LLE pain resolved as well.  12/1/2023-caudal INGE- 100% pain relief with functional improvement.  Still had trouble walking for prolonged period of time.  9/20/2021-caudal INGE- 100% pain relief for 1.5 years.  8/7/2021-caudal INGE-80% pain relief for 1 month.     PHQ-9- 4  SOAPP- 5     PMH:   Back surgeries x2 (2011) - lumbar disectomy/ decompression L3 -S1; laminectomies L4-5, L5-S1, R hip arthroplasty - 2011,  hypertension, tobacco use, left knee OA, bladder surgery-InterStim, GERD, ulcerative colitis.         Current Medications:   Meloxicam 15 daily PRN  Citalopram      Hx: referred by Dr. Fermin, Ana Harrington MD.  Her pain started about 2 months ago and has been getting worse since then.      Past Medications:   Diclofenac 50 mg twice daily as needed\    Past Modalities:  TENS:                                                                           YES                                               Physical Therapy Within The Last 6 Months              no  Psychotherapy                                                            no  Massage Therapy                                                       no     Patient Complains Of:  Uro-Fecal Incontinence          No (chronic urinary continence)   Weight Gain/Loss                   no  Fever/Chills                             no  Weakness                               no       Current Outpatient Medications:     amLODIPine (NORVASC) 10 MG tablet, TAKE 1 TABLET BY MOUTH DAILY WITH DINNER, Disp: 90 tablet, Rfl: 1    atorvastatin (LIPITOR) 20 MG tablet, TAKE ONE TABLET BY MOUTH ONCE NIGHTLY, Disp: 90 tablet, Rfl: 0    Bromfenac Sodium (Prolensa) 0.07 % solution, Apply 1 drop to eye(s) as directed by provider Every 12 (Twelve) Hours., Disp: , Rfl:     Calcium Carbonate (CALCIUM 600 PO), Take  by mouth., Disp: , Rfl:     carvedilol (COREG) 25 MG tablet, TAKE TWO TABLETS BY MOUTH TWICE A DAY WITH A MEAL, Disp: 360 tablet, Rfl: 1    Cholecalciferol (VITAMIN D3) 125 MCG (5000 UT) tablet tablet, Take 1 tablet by mouth., Disp: , Rfl:     colestipol (COLESTID) 1 g tablet, , Disp: , Rfl:     difluprednate (DUREZOL) 0.05 % ophthalmic emulsion, Apply 1 drop to eye(s) as directed by provider Every 6 (Six) Hours., Disp: , Rfl:     escitalopram (LEXAPRO) 10 MG tablet, TAKE ONE TABLET BY MOUTH DAILY, Disp: 90 tablet, Rfl: 3    folic acid (FOLVITE) 1 MG tablet, Take 1 tablet by mouth Daily., Disp: , Rfl:     gabapentin (NEURONTIN) 300 MG capsule, Take 1 capsule by mouth 3 (Three) Times a Day for 180 days., Disp: 270 capsule, Rfl: 1    KRILL OIL PO, Take  by mouth., Disp: , Rfl:     lisinopril-hydrochlorothiazide (PRINZIDE,ZESTORETIC) 20-12.5 MG per tablet, TAKE 2 TABLETS BY MOUTH DAILY WITH BREAKFAST, Disp: 180 tablet, Rfl: 1    Milk Thistle 175 MG capsule, , Disp: , Rfl:     multivitamin with minerals tablet tablet, Take 1  tablet by mouth Daily., Disp: , Rfl:     omeprazole (priLOSEC) 20 MG capsule, Take 1 capsule by mouth Daily., Disp: , Rfl:     Risankizumab-rzaa (SKYRIZI SC), Inject  under the skin into the appropriate area as directed., Disp: , Rfl:     Selenium 200 MCG capsule, , Disp: , Rfl:     sulfaSALAzine (AZULFIDINE) 500 MG tablet, Take 2 tablets by mouth 2 (Two) Times a Day., Disp: , Rfl:     Turmeric 500 MG tablet, Take 1 tablet by mouth Daily., Disp: , Rfl:     Zinc 100 MG tablet, , Disp: , Rfl:     The following portions of the patient's history were reviewed and updated as appropriate: allergies, current medications, past family history, past medical history, past social history, past surgical history, and problem list.      REVIEW OF PERTINENT MEDICAL DATA    Past Medical History:   Diagnosis Date    Allergic     Arthritis 20 hrs ago?    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    CTS (carpal tunnel syndrome)     Difficulty walking     Diverticulosis     Fractures 1994    Broke ankle in 17 inches of snow.    GERD (gastroesophageal reflux disease)     Hepatic hemangioma 10/2017    Seen on MRI of the liver    Hyperlipidemia     Hypertension     Irritable bowel syndrome 2015    Joint pain     Knee - shoulder - hip (replaced in 2011)    Leg pain     Low back pain     Pneumonia     Sleep apnea     Spinal stenosis 2011    Had 2 back surgeries---narrowing at L3-4 junction    Ulcerative colitis 03/2018    Followed by GI, Dr. Vanda Sotomayor; treated with sulfasalazine    Visual impairment Jan. 12, 2022    Started with double vision.  Ended up having cataracts removed and corrected to mono vision.  Having a lot of problems with jr up vision in left eye.     Past Surgical History:   Procedure Laterality Date    ANKLE SURGERY Right     broken right ankle     BACK SURGERY      BLADDER SURGERY      bladder sling     BREAST  AUGMENTATION Bilateral 2015 & 11/28/2017    recurrent capsular contracture    BREAST SURGERY  1984    2013    CATARACT EXTRACTION, BILATERAL  2022    COLONOSCOPY      EPIDURAL BLOCK      Many times    EYE SURGERY      FRACTURE SURGERY  1993    INTERSTIM PLACEMENT Right 11/2020    for bladder and bowel control - [employer]    JOINT REPLACEMENT      ORTHOPEDIC SURGERY  2011    Hip replacement    SKIN BIOPSY  02/06/2019    Shave biopsy of frontal scalp; irritated verruca vulgaris    SPINE SURGERY  2011    TOE SURGERY      big toe left foot     TOTAL HIP ARTHROPLASTY Right     TRIGGER POINT INJECTION  2024    For bursa    TUBAL ABDOMINAL LIGATION Bilateral     UPPER ENDOSCOPIC ULTRASOUND W/ FNA N/A 11/27/2023    Procedure: ESOPHAGOGASTRODUODENOSCOPY WITH BIOPSY X2 AREAS AND ENDOSCOPIC ULTRASOUND WITH LIVER BIOPSY;  Surgeon: Rolando Summers MD;  Location: [facility] ENDOSCOPY;  Service: Gastroenterology;  Laterality: N/A;  POST: DUODENAL AND PERIAMPULLARY DIVERTICULUM     Family History   Problem Relation Age of Onset    Hypertension Mother     Arthritis Mother     Stroke Mother     Dementia Mother     Hypertension Father     Arthritis Father     Hyperlipidemia Father     Stroke Father     Vision loss Father     Dementia Father     Parkinsonism Father     Colon cancer Sister 43    Cancer Sister         Colon    Depression Son     Hearing loss Son     Hyperlipidemia Sister     Hypertension Sister     Hyperlipidemia Brother     Hypertension Brother      Social History     Socioeconomic History    Marital status:    Tobacco Use    Smoking status: Former     Current packs/day: 0.00     Average packs/day: 1 pack/day for 33.8 years (33.8 ttl pk-yrs)     Types: Cigarettes     Start date: 1/1/1980     Quit date: 10/13/2013     Years since quitting: 10.9    Smokeless tobacco: Never   Vaping Use    Vaping status: Never Used   Substance and Sexual Activity    Alcohol use: Yes     Alcohol/week: 20.0 standard drinks of alcohol      Types: 20 Shots of liquor per week     Comment: bourbon- couple daily     Drug use: Never    Sexual activity: Yes     Partners: Male     Birth control/protection: Post-menopausal         Review of Systems   Musculoskeletal:  Positive for arthralgias and back pain.         Vitals:    09/04/24 1513   BP: 142/80   Pulse: 72   Resp: 16   SpO2: 95%   Weight: 81.2 kg (179 lb)   PainSc: 0-No pain                   Objective   Physical Exam  Musculoskeletal:         General: Tenderness present.        Legs:            Imaging Reviewed:    MRI lumbar spine- 10/3/23-   S/p L4, L5 laminectomies  Modic type I degenerative changes  - L3-5 - mild spinal canal narrowing and moderate to severe b/l foraminal narrowing.   - L4-5 - facet arthropathy causing modecate to severe b/l neural foraminal narrowing. S/p parker  L5-S1 - facet arthropathy causing modecate to severe b/l neural foraminal narrowing. S/p parker      CT lumbar spine-7/20/2021-independently reviewed  [id number] central stenosis or facet changes.  L3-L4 -facet arthritis. No stenosis  K6-H4-ktuowwcpz neuroforaminal narrowing. Greater than left due to spurring and facet arthritis. There may be nerve impingement especially in the right side.  V2-B4-ypiootnke neuroforaminal narrowing secondary to prominent endplate spurring. Mild facet arthritis. No spinal stenosis.     Assessment:    1. Trochanteric bursitis of both hips    2. Lumbar radiculopathy    3. Hx of decompressive lumbar laminectomy    4. Post laminectomy syndrome          Plan:   1. Defer UDS for now.   2. We discussed trying a course of formal physical therapy.  Physical therapy can help strengthen and stretch the muscles around the joints. Continue to be as active as possible.   3. Good relief with caudal INGE. Repeat PRN.   4. Continue gabapentin 300 mg BID (9/16/24). Side effects discussed with the patient including but not limited to somnolence, dizziness, ataxia, headache, fatigue, blurred vision, cold or  flu-like symptoms,delusions, hoarseness, lack or loss of strength, lower back or side pain, swelling of the hands, feet, or lower legs trembling or shaking. Patient understands and agrees with the plan.  5. Voltaren gel 1% for b/l troch bursa.   6 Patient has pain in the lower back with referred pain in the legs. He had multiple procedures and back surgery in the past with no improvement in pain.  She is not a candidate for repeat surgery and has failed more than 6 months of conservative therapy and there are no contraindications for SCS trial. So he is good candidate for SCS trial. [employer]. Psych eval sent- 6/17/24.   7. Patient has pain in the bilateral hip area, trochanteric bursa tenderness present. Discussed bilateral trochanteric bursa injection. Discussed the possibility of infection, bleeding, nerve damage, headache, increased pain, paraplegia. Patient understands and agrees.     RTC for trochanteric bursa injections bilateral.     Uche Huizar DO  Pain Management   [facility]          INSPECT REPORT    As part of the patient's treatment plan, I may be prescribing controlled substances. The patient has been made aware of appropriate use of such medications, including potential risk of somnolence, limited ability to drive and/or work safely, and the potential for dependence or overdose. It has also been made clear that these medications are for use by this patient only, without concomitant use of alcohol or other substances unless prescribed.     Patient has completed prescribing agreement detailing terms of continued prescribing of controlled substances, including monitoring INSPECT reports, urine drug screening, and pill counts if necessary. The patient is aware that inappropriate use will results in cessation of prescribing such medications.    INSPECT report has been reviewed and scanned into the patient's chart.

## 2024-09-04 ENCOUNTER — OFFICE VISIT (OUTPATIENT)
Dept: PAIN MEDICINE | Facility: CLINIC | Age: 74
End: 2024-09-04
Payer: MEDICARE

## 2024-09-04 VITALS
WEIGHT: 179 LBS | BODY MASS INDEX: 29.79 KG/M2 | OXYGEN SATURATION: 95 % | DIASTOLIC BLOOD PRESSURE: 80 MMHG | RESPIRATION RATE: 16 BRPM | HEART RATE: 72 BPM | SYSTOLIC BLOOD PRESSURE: 142 MMHG

## 2024-09-04 DIAGNOSIS — M70.61 TROCHANTERIC BURSITIS OF BOTH HIPS: Primary | ICD-10-CM

## 2024-09-04 DIAGNOSIS — M96.1 POST LAMINECTOMY SYNDROME: ICD-10-CM

## 2024-09-04 DIAGNOSIS — Z98.890 HX OF DECOMPRESSIVE LUMBAR LAMINECTOMY: ICD-10-CM

## 2024-09-04 DIAGNOSIS — M54.16 LUMBAR RADICULOPATHY: ICD-10-CM

## 2024-09-04 DIAGNOSIS — M70.62 TROCHANTERIC BURSITIS OF BOTH HIPS: Primary | ICD-10-CM

## 2024-09-04 PROCEDURE — 1160F RVW MEDS BY RX/DR IN RCRD: CPT | Performed by: STUDENT IN AN ORGANIZED HEALTH CARE EDUCATION/TRAINING PROGRAM

## 2024-09-04 PROCEDURE — 3077F SYST BP >= 140 MM HG: CPT | Performed by: STUDENT IN AN ORGANIZED HEALTH CARE EDUCATION/TRAINING PROGRAM

## 2024-09-04 PROCEDURE — 3079F DIAST BP 80-89 MM HG: CPT | Performed by: STUDENT IN AN ORGANIZED HEALTH CARE EDUCATION/TRAINING PROGRAM

## 2024-09-04 PROCEDURE — 99214 OFFICE O/P EST MOD 30 MIN: CPT | Performed by: STUDENT IN AN ORGANIZED HEALTH CARE EDUCATION/TRAINING PROGRAM

## 2024-09-04 PROCEDURE — 1126F AMNT PAIN NOTED NONE PRSNT: CPT | Performed by: STUDENT IN AN ORGANIZED HEALTH CARE EDUCATION/TRAINING PROGRAM

## 2024-09-04 PROCEDURE — 1159F MED LIST DOCD IN RCRD: CPT | Performed by: STUDENT IN AN ORGANIZED HEALTH CARE EDUCATION/TRAINING PROGRAM

## 2024-09-04 RX ORDER — GABAPENTIN 300 MG/1
300 CAPSULE ORAL 3 TIMES DAILY
Qty: 270 CAPSULE | Refills: 1 | Status: SHIPPED | OUTPATIENT
Start: 2024-09-04 | End: 2025-03-03

## 2024-09-09 ENCOUNTER — PROCEDURE VISIT (OUTPATIENT)
Dept: PAIN MEDICINE | Facility: CLINIC | Age: 74
End: 2024-09-09
Payer: MEDICARE

## 2024-09-09 VITALS
DIASTOLIC BLOOD PRESSURE: 85 MMHG | HEART RATE: 75 BPM | RESPIRATION RATE: 16 BRPM | OXYGEN SATURATION: 93 % | WEIGHT: 181.1 LBS | SYSTOLIC BLOOD PRESSURE: 133 MMHG | BODY MASS INDEX: 30.14 KG/M2

## 2024-09-09 DIAGNOSIS — M70.62 TROCHANTERIC BURSITIS OF BOTH HIPS: Primary | ICD-10-CM

## 2024-09-09 DIAGNOSIS — M70.61 TROCHANTERIC BURSITIS OF BOTH HIPS: Primary | ICD-10-CM

## 2024-09-09 PROCEDURE — 20610 DRAIN/INJ JOINT/BURSA W/O US: CPT | Performed by: STUDENT IN AN ORGANIZED HEALTH CARE EDUCATION/TRAINING PROGRAM

## 2024-09-09 RX ORDER — BUPIVACAINE HYDROCHLORIDE 2.5 MG/ML
10 INJECTION, SOLUTION INFILTRATION; PERINEURAL ONCE
Status: COMPLETED | OUTPATIENT
Start: 2024-09-09 | End: 2024-09-09

## 2024-09-09 RX ORDER — METHYLPREDNISOLONE ACETATE 40 MG/ML
40 INJECTION, SUSPENSION INTRA-ARTICULAR; INTRALESIONAL; INTRAMUSCULAR; SOFT TISSUE ONCE
Status: COMPLETED | OUTPATIENT
Start: 2024-09-09 | End: 2024-09-09

## 2024-09-09 RX ADMIN — BUPIVACAINE HYDROCHLORIDE 10 ML: 2.5 INJECTION, SOLUTION INFILTRATION; PERINEURAL at 14:35

## 2024-09-09 RX ADMIN — BUPIVACAINE HYDROCHLORIDE 10 ML: 2.5 INJECTION, SOLUTION INFILTRATION; PERINEURAL at 14:38

## 2024-09-09 RX ADMIN — METHYLPREDNISOLONE ACETATE 40 MG: 40 INJECTION, SUSPENSION INTRA-ARTICULAR; INTRALESIONAL; INTRAMUSCULAR; SOFT TISSUE at 14:37

## 2024-09-09 RX ADMIN — METHYLPREDNISOLONE ACETATE 40 MG: 40 INJECTION, SUSPENSION INTRA-ARTICULAR; INTRALESIONAL; INTRAMUSCULAR; SOFT TISSUE at 14:36

## 2024-09-09 NOTE — PROGRESS NOTES
H and P reviewed from previous visit and no changes to patient's clinical presentation. Will proceed with procedure as planned. Patient denies history of DM and being on blood thinners.    Uche Huizar DO  Pain Management   [facility]     PROCEDURE:  Bilateral Trochanteric Bursa Injection     PROCEDURE IN DETAIL:  Patient was placed in the lateral decubitus position with right side up. Two areas of maximum tenderness over the trochanteric bursa was identified, marked and then the area was prepped with antiseptic solution and draped in sterile fashion.  22 gauge 3.5 inch spinal needle was inserted through the skin until the needle touched the trochanteric bursa on the right side.  Then, after negative aspiration, 5 ml mixture of 0.25% Marcaine with 40 mg of Depo-medrol was injected. The needle was then removed.  Repeat procedure was performed on the second area of maximum tenderness on right side. Patient was turned on opposite side with left side up in lateral decubitus position. Entire procedure was repeated on left side with injection to two areas of maximum tenderness. Total 10  ml mixture of 0.25% Marcaine with 40 mg Depo-Medrol was injected on left side.     The patient tolerated the procedure well and a band-Aid was applied and the patient was taken to the recovery room in stable condition      Uche Huizar DO  Pain Management   [facility]

## 2024-09-27 NOTE — PROGRESS NOTES
Subjective   Meliza Red is a 74 y.o. female is here for follow up for lower back pain.  Patient was last seen for bilateral trochanter bursa injection with good relief.  She states that she feels that her back gets significantly tight when she has increased activities.    On last visit:     Lower back pain is 1/10 on VAS and at worse 9/10 on VAS at bed time. Pain is dull, achy in nature. Pain is referred left buttock, lateral left thigh and left leg. Intermittent numbness and tingling- mostly resolved after caudal INGE. The pain is constant. The pain is improved by meloxicam and caudal INGE but still has trouble walking for long distances. The pain is worse with walking.  Unable to walk for prolonged distance which would improve with bending forward.  + Shopping cart sign    B/l hip/ trochanter bursa pain is 2/10 on VAS. Intermittent, only when she lays down on it at night time.     Previous Injection:   9/9/2024-bilateral trochanteric bursa injection-50% pain relief.  Significantly improved pain at nighttime.  Still hurts when she tries to walk 4 out of 10 as compared to 10 out of 10 before.  1/17/24 - b/l troch bursa injection -  90% pain relief for 6 months.  LLE pain resolved as well.  12/1/2023-caudal INGE- 100% pain relief with functional improvement.  Still had trouble walking for prolonged period of time.  9/20/2021-caudal INGE- 100% pain relief for 1.5 years.  8/7/2021-caudal INGE-80% pain relief for 1 month.     PHQ-9- 4  SOAPP- 5     PMH:   Back surgeries x2 (2011) - lumbar disectomy/ decompression L3 -S1; laminectomies L4-5, L5-S1, R hip arthroplasty - 2011,  hypertension, tobacco use, left knee OA, bladder surgery-InterStim, GERD, ulcerative colitis.         Current Medications:   Meloxicam 15 daily PRN  Citalopram      Hx: referred by Dr. Fermin, Ana Harrington MD.  Her pain started about 2 months ago and has been getting worse since then.      Past Medications:   Diclofenac 50 mg twice daily as  needed\    Past Modalities:  TENS:                                                                          YES                                               Physical Therapy Within The Last 6 Months              no  Psychotherapy                                                            no  Massage Therapy                                                       no     Patient Complains Of:  Uro-Fecal Incontinence          No (chronic urinary continence)   Weight Gain/Loss                   no  Fever/Chills                             no  Weakness                               no       Current Outpatient Medications:     amLODIPine (NORVASC) 10 MG tablet, TAKE 1 TABLET BY MOUTH DAILY WITH DINNER, Disp: 90 tablet, Rfl: 1    atorvastatin (LIPITOR) 20 MG tablet, TAKE ONE TABLET BY MOUTH ONCE NIGHTLY, Disp: 90 tablet, Rfl: 0    Bromfenac Sodium (Prolensa) 0.07 % solution, Apply 1 drop to eye(s) as directed by provider Every 12 (Twelve) Hours., Disp: , Rfl:     Calcium Carbonate (CALCIUM 600 PO), Take  by mouth., Disp: , Rfl:     carvedilol (COREG) 25 MG tablet, TAKE TWO TABLETS BY MOUTH TWICE A DAY WITH A MEAL, Disp: 360 tablet, Rfl: 1    Cholecalciferol (VITAMIN D3) 125 MCG (5000 UT) tablet tablet, Take 1 tablet by mouth., Disp: , Rfl:     colestipol (COLESTID) 1 g tablet, , Disp: , Rfl:     difluprednate (DUREZOL) 0.05 % ophthalmic emulsion, Apply 1 drop to eye(s) as directed by provider Every 6 (Six) Hours., Disp: , Rfl:     escitalopram (LEXAPRO) 10 MG tablet, TAKE ONE TABLET BY MOUTH DAILY, Disp: 90 tablet, Rfl: 3    folic acid (FOLVITE) 1 MG tablet, Take 1 tablet by mouth Daily., Disp: , Rfl:     gabapentin (NEURONTIN) 300 MG capsule, Take 1 capsule by mouth 3 (Three) Times a Day for 180 days., Disp: 270 capsule, Rfl: 1    KRILL OIL PO, Take  by mouth., Disp: , Rfl:     lisinopril-hydrochlorothiazide (PRINZIDE,ZESTORETIC) 20-12.5 MG per tablet, TAKE 2 TABLETS BY MOUTH DAILY WITH BREAKFAST, Disp: 180 tablet, Rfl:  1    Milk Thistle 175 MG capsule, , Disp: , Rfl:     multivitamin with minerals tablet tablet, Take 1 tablet by mouth Daily., Disp: , Rfl:     omeprazole (priLOSEC) 20 MG capsule, Take 1 capsule by mouth Daily., Disp: , Rfl:     Risankizumab-rzaa (SKYRIZI SC), Inject  under the skin into the appropriate area as directed., Disp: , Rfl:     Selenium 200 MCG capsule, , Disp: , Rfl:     sulfaSALAzine (AZULFIDINE) 500 MG tablet, Take 2 tablets by mouth 2 (Two) Times a Day., Disp: , Rfl:     Turmeric 500 MG tablet, Take 1 tablet by mouth Daily., Disp: , Rfl:     Zinc 100 MG tablet, , Disp: , Rfl:     The following portions of the patient's history were reviewed and updated as appropriate: allergies, current medications, past family history, past medical history, past social history, past surgical history, and problem list.      REVIEW OF PERTINENT MEDICAL DATA    Past Medical History:   Diagnosis Date    Allergic     Arthritis 20 hrs ago?    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    CTS (carpal tunnel syndrome)     Difficulty walking     Diverticulosis     Fractures 1994    Broke ankle in 17 inches of snow.    GERD (gastroesophageal reflux disease)     Hepatic hemangioma 10/2017    Seen on MRI of the liver    Hyperlipidemia     Hypertension     Irritable bowel syndrome 2015    Joint pain     Knee - shoulder - hip (replaced in 2011)    Leg pain     Low back pain     Pneumonia     Sleep apnea     Spinal stenosis 2011    Had 2 back surgeries---narrowing at L3-4 junction    Ulcerative colitis 03/2018    Followed by GI, Dr. Vanda Sotomayor; treated with sulfasalazine    Visual impairment Jan. 12, 2022    Started with double vision.  Ended up having cataracts removed and corrected to mono vision.  Having a lot of problems with jr up vision in left eye.     Past Surgical History:   Procedure Laterality Date    ANKLE SURGERY  Right     broken right ankle     BACK SURGERY      BLADDER SURGERY      bladder sling     BREAST AUGMENTATION Bilateral 2015 & 11/28/2017    recurrent capsular contracture    BREAST SURGERY  1984    2013    CATARACT EXTRACTION, BILATERAL  2022    COLONOSCOPY      EPIDURAL BLOCK      Many times    EYE SURGERY      FRACTURE SURGERY  1993    INTERSTIM PLACEMENT Right 11/2020    for bladder and bowel control - [employer]    JOINT REPLACEMENT      ORTHOPEDIC SURGERY  2011    Hip replacement    SKIN BIOPSY  02/06/2019    Shave biopsy of frontal scalp; irritated verruca vulgaris    SPINE SURGERY  2011    TOE SURGERY      big toe left foot     TOTAL HIP ARTHROPLASTY Right     TRIGGER POINT INJECTION  2024    For bursa    TUBAL ABDOMINAL LIGATION Bilateral     UPPER ENDOSCOPIC ULTRASOUND W/ FNA N/A 11/27/2023    Procedure: ESOPHAGOGASTRODUODENOSCOPY WITH BIOPSY X2 AREAS AND ENDOSCOPIC ULTRASOUND WITH LIVER BIOPSY;  Surgeon: Rolando Summers MD;  Location: [facility] ENDOSCOPY;  Service: Gastroenterology;  Laterality: N/A;  POST: DUODENAL AND PERIAMPULLARY DIVERTICULUM     Family History   Problem Relation Age of Onset    Hypertension Mother     Arthritis Mother     Stroke Mother     Dementia Mother     Hypertension Father     Arthritis Father     Hyperlipidemia Father     Stroke Father     Vision loss Father     Dementia Father     Parkinsonism Father     Colon cancer Sister 43    Cancer Sister         Colon    Depression Son     Hearing loss Son     Hyperlipidemia Sister     Hypertension Sister     Hyperlipidemia Brother     Hypertension Brother      Social History     Socioeconomic History    Marital status:    Tobacco Use    Smoking status: Former     Current packs/day: 0.00     Average packs/day: 1 pack/day for 33.8 years (33.8 ttl pk-yrs)     Types: Cigarettes     Start date: 1/1/1980     Quit date: 10/13/2013     Years since quitting: 10.9    Smokeless tobacco: Never   Vaping Use    Vaping status: Never Used    Substance and Sexual Activity    Alcohol use: Yes     Alcohol/week: 20.0 standard drinks of alcohol     Types: 20 Shots of liquor per week     Comment: bourbon- couple daily     Drug use: Never    Sexual activity: Yes     Partners: Male     Birth control/protection: Post-menopausal         Review of Systems   Musculoskeletal:  Positive for arthralgias and back pain.         Vitals:    09/30/24 1546   BP: 139/79   Pulse: 83   Resp: 16   SpO2: 92%   Weight: 80.8 kg (178 lb 1.6 oz)   PainSc:   4                     Objective   Physical Exam  Musculoskeletal:         General: Tenderness present.        Legs:            Imaging Reviewed:    MRI lumbar spine- 10/3/23-   S/p L4, L5 laminectomies  Modic type I degenerative changes  - L3-5 - mild spinal canal narrowing and moderate to severe b/l foraminal narrowing.   - L4-5 - facet arthropathy causing modecate to severe b/l neural foraminal narrowing. S/p parker  L5-S1 - facet arthropathy causing modecate to severe b/l neural foraminal narrowing. S/p parker      CT lumbar spine-7/20/2021-independently reviewed  [id number]-ym central stenosis or facet changes.  L3-L4 -facet arthritis. No stenosis  M8-I9-eyskhkfwv neuroforaminal narrowing. Greater than left due to spurring and facet arthritis. There may be nerve impingement especially in the right side.  C0-J9-[id number] neuroforaminal narrowing secondary to prominent endplate spurring. Mild facet arthritis. No spinal stenosis.     Assessment:    1. Trochanteric bursitis of both hips    2. Lumbar radiculopathy    3. Hx of decompressive lumbar laminectomy            Plan:   1. Defer UDS for now.   2. We discussed trying a course of formal physical therapy.  Physical therapy can help strengthen and stretch the muscles around the joints. Continue to be as active as possible.   3. Good relief with caudal IGNE. Repeat PRN.   4. Continue gabapentin 300 mg BID (9/16/24). Side effects discussed with the patient including but not limited to  somnolence, dizziness, ataxia, headache, fatigue, blurred vision, cold or flu-like symptoms,delusions, hoarseness, lack or loss of strength, lower back or side pain, swelling of the hands, feet, or lower legs trembling or shaking. Patient understands and agrees with the plan.  5. Voltaren gel 1% for b/l troch bursa.   6 Patient has pain in the lower back with referred pain in the legs. He had multiple procedures and back surgery in the past with no improvement in pain.  She is not a candidate for repeat surgery and has failed more than 6 months of conservative therapy and there are no contraindications for SCS trial. So he is good candidate for SCS trial. [employer]. Psych eval sent- 6/17/24.   7.  Good relief with bilateral trochanteric bursa injection.  Repeat as needed.  8.  Start tizanidine 4 mg as needed.    RTC in 4 months.     Uche Huizar DO  Pain Management   [facility]          INSPECT REPORT    As part of the patient's treatment plan, I may be prescribing controlled substances. The patient has been made aware of appropriate use of such medications, including potential risk of somnolence, limited ability to drive and/or work safely, and the potential for dependence or overdose. It has also been made clear that these medications are for use by this patient only, without concomitant use of alcohol or other substances unless prescribed.     Patient has completed prescribing agreement detailing terms of continued prescribing of controlled substances, including monitoring INSPECT reports, urine drug screening, and pill counts if necessary. The patient is aware that inappropriate use will results in cessation of prescribing such medications.    INSPECT report has been reviewed and scanned into the patient's chart.

## 2024-09-30 ENCOUNTER — OFFICE VISIT (OUTPATIENT)
Dept: PAIN MEDICINE | Facility: CLINIC | Age: 74
End: 2024-09-30
Payer: MEDICARE

## 2024-09-30 VITALS
HEART RATE: 83 BPM | BODY MASS INDEX: 29.64 KG/M2 | SYSTOLIC BLOOD PRESSURE: 139 MMHG | DIASTOLIC BLOOD PRESSURE: 79 MMHG | OXYGEN SATURATION: 92 % | RESPIRATION RATE: 16 BRPM | WEIGHT: 178.1 LBS

## 2024-09-30 DIAGNOSIS — M70.62 TROCHANTERIC BURSITIS OF BOTH HIPS: ICD-10-CM

## 2024-09-30 DIAGNOSIS — Z98.890 HX OF DECOMPRESSIVE LUMBAR LAMINECTOMY: ICD-10-CM

## 2024-09-30 DIAGNOSIS — M70.61 TROCHANTERIC BURSITIS OF BOTH HIPS: ICD-10-CM

## 2024-09-30 DIAGNOSIS — M54.16 LUMBAR RADICULOPATHY: ICD-10-CM

## 2024-09-30 PROCEDURE — 99214 OFFICE O/P EST MOD 30 MIN: CPT | Performed by: STUDENT IN AN ORGANIZED HEALTH CARE EDUCATION/TRAINING PROGRAM

## 2024-09-30 PROCEDURE — 3078F DIAST BP <80 MM HG: CPT | Performed by: STUDENT IN AN ORGANIZED HEALTH CARE EDUCATION/TRAINING PROGRAM

## 2024-09-30 PROCEDURE — 3075F SYST BP GE 130 - 139MM HG: CPT | Performed by: STUDENT IN AN ORGANIZED HEALTH CARE EDUCATION/TRAINING PROGRAM

## 2024-09-30 PROCEDURE — 1160F RVW MEDS BY RX/DR IN RCRD: CPT | Performed by: STUDENT IN AN ORGANIZED HEALTH CARE EDUCATION/TRAINING PROGRAM

## 2024-09-30 PROCEDURE — 1125F AMNT PAIN NOTED PAIN PRSNT: CPT | Performed by: STUDENT IN AN ORGANIZED HEALTH CARE EDUCATION/TRAINING PROGRAM

## 2024-09-30 PROCEDURE — 1159F MED LIST DOCD IN RCRD: CPT | Performed by: STUDENT IN AN ORGANIZED HEALTH CARE EDUCATION/TRAINING PROGRAM

## 2024-09-30 RX ORDER — GABAPENTIN 300 MG/1
300 CAPSULE ORAL 3 TIMES DAILY
Qty: 270 CAPSULE | Refills: 1 | Status: SHIPPED | OUTPATIENT
Start: 2024-09-30 | End: 2025-03-29

## 2024-10-01 ENCOUNTER — TELEPHONE (OUTPATIENT)
Dept: PAIN MEDICINE | Facility: CLINIC | Age: 74
End: 2024-10-01
Payer: MEDICARE

## 2024-10-01 RX ORDER — TIZANIDINE HYDROCHLORIDE 4 MG/1
4 CAPSULE, GELATIN COATED ORAL 3 TIMES DAILY PRN
Qty: 90 CAPSULE | Refills: 2 | Status: SHIPPED | OUTPATIENT
Start: 2024-10-01

## 2024-10-01 NOTE — TELEPHONE ENCOUNTER
Pharmacy does not have Tizanidine 4mg tablets they are on back order. Can you change to 4mg caps or 2 mg tabs?

## 2024-10-10 ENCOUNTER — TELEPHONE (OUTPATIENT)
Dept: PAIN MEDICINE | Facility: CLINIC | Age: 74
End: 2024-10-10
Payer: MEDICARE

## 2024-10-10 RX ORDER — TIZANIDINE 2 MG/1
4 TABLET ORAL 3 TIMES DAILY PRN
Qty: 180 TABLET | Refills: 2 | Status: SHIPPED | OUTPATIENT
Start: 2024-10-10

## 2024-10-10 NOTE — TELEPHONE ENCOUNTER
Insurance will not cover caps can you send in Tizanidine 2mg tabs? The pharmacy does not have the 4mg tabs.

## 2024-10-15 ENCOUNTER — OFFICE (AMBULATORY)
Age: 74
End: 2024-10-15
Payer: COMMERCIAL

## 2024-10-15 ENCOUNTER — OFFICE (AMBULATORY)
Dept: URBAN - METROPOLITAN AREA CLINIC 64 | Facility: CLINIC | Age: 74
End: 2024-10-15
Payer: COMMERCIAL

## 2024-10-15 VITALS
HEIGHT: 65 IN | SYSTOLIC BLOOD PRESSURE: 121 MMHG | HEART RATE: 71 BPM | DIASTOLIC BLOOD PRESSURE: 72 MMHG | HEART RATE: 71 BPM | HEART RATE: 71 BPM | WEIGHT: 178 LBS | SYSTOLIC BLOOD PRESSURE: 121 MMHG | SYSTOLIC BLOOD PRESSURE: 121 MMHG | HEIGHT: 65 IN | HEART RATE: 71 BPM | HEART RATE: 71 BPM | HEIGHT: 65 IN | HEIGHT: 65 IN | WEIGHT: 178 LBS | DIASTOLIC BLOOD PRESSURE: 72 MMHG | DIASTOLIC BLOOD PRESSURE: 72 MMHG | SYSTOLIC BLOOD PRESSURE: 121 MMHG | HEIGHT: 65 IN | HEIGHT: 65 IN | SYSTOLIC BLOOD PRESSURE: 121 MMHG | WEIGHT: 178 LBS | WEIGHT: 178 LBS | DIASTOLIC BLOOD PRESSURE: 72 MMHG | DIASTOLIC BLOOD PRESSURE: 72 MMHG | WEIGHT: 178 LBS | SYSTOLIC BLOOD PRESSURE: 121 MMHG | DIASTOLIC BLOOD PRESSURE: 72 MMHG | SYSTOLIC BLOOD PRESSURE: 121 MMHG | WEIGHT: 178 LBS | DIASTOLIC BLOOD PRESSURE: 72 MMHG | HEART RATE: 71 BPM | HEART RATE: 71 BPM | WEIGHT: 178 LBS | HEIGHT: 65 IN

## 2024-10-15 DIAGNOSIS — K75.81 NONALCOHOLIC STEATOHEPATITIS (NASH): ICD-10-CM

## 2024-10-15 DIAGNOSIS — K51.50 LEFT SIDED COLITIS WITHOUT COMPLICATIONS: ICD-10-CM

## 2024-10-15 PROCEDURE — 99214 OFFICE O/P EST MOD 30 MIN: CPT | Performed by: INTERNAL MEDICINE

## 2024-10-15 RX ORDER — FOLIC ACID 1 MG/1
1 TABLET ORAL
Qty: 90 | Refills: 3 | Status: ACTIVE
Start: 2020-02-28

## 2024-10-15 RX ORDER — SULFASALAZINE 500 MG/1
2000 TABLET ORAL
Qty: 360 | Refills: 3 | Status: ACTIVE
Start: 2018-10-31

## 2024-11-18 ENCOUNTER — TELEPHONE (OUTPATIENT)
Dept: PAIN MEDICINE | Facility: CLINIC | Age: 74
End: 2024-11-18
Payer: MEDICARE

## 2024-11-18 DIAGNOSIS — M70.62 TROCHANTERIC BURSITIS OF BOTH HIPS: Primary | ICD-10-CM

## 2024-11-18 DIAGNOSIS — M70.61 TROCHANTERIC BURSITIS OF BOTH HIPS: Primary | ICD-10-CM

## 2024-11-18 NOTE — TELEPHONE ENCOUNTER
"Provider: DR. BERNSTEIN    Caller: Meliza Red \"Huong\"    Relationship to Patient: Self    Phone Number: [phone]    Reason for Call: PATIENT CALL REQUESTING BILATERAL HIP INJECTIONS FOR BURSITIS. PLEASE ADVISE.   "

## 2024-11-25 ENCOUNTER — PROCEDURE VISIT (OUTPATIENT)
Dept: PAIN MEDICINE | Facility: CLINIC | Age: 74
End: 2024-11-25
Payer: MEDICARE

## 2024-11-25 VITALS
WEIGHT: 178 LBS | OXYGEN SATURATION: 91 % | HEART RATE: 82 BPM | RESPIRATION RATE: 16 BRPM | DIASTOLIC BLOOD PRESSURE: 83 MMHG | SYSTOLIC BLOOD PRESSURE: 146 MMHG | BODY MASS INDEX: 29.62 KG/M2

## 2024-11-25 DIAGNOSIS — M70.62 TROCHANTERIC BURSITIS OF BOTH HIPS: Primary | ICD-10-CM

## 2024-11-25 DIAGNOSIS — M70.61 TROCHANTERIC BURSITIS OF BOTH HIPS: Primary | ICD-10-CM

## 2024-11-25 PROCEDURE — 20610 DRAIN/INJ JOINT/BURSA W/O US: CPT | Performed by: STUDENT IN AN ORGANIZED HEALTH CARE EDUCATION/TRAINING PROGRAM

## 2024-11-25 RX ORDER — METHYLPREDNISOLONE ACETATE 40 MG/ML
40 INJECTION, SUSPENSION INTRA-ARTICULAR; INTRALESIONAL; INTRAMUSCULAR; SOFT TISSUE ONCE
Status: COMPLETED | OUTPATIENT
Start: 2024-11-25 | End: 2024-11-25

## 2024-11-25 RX ORDER — BUPIVACAINE HYDROCHLORIDE 2.5 MG/ML
10 INJECTION, SOLUTION INFILTRATION; PERINEURAL ONCE
Status: COMPLETED | OUTPATIENT
Start: 2024-11-25 | End: 2024-11-25

## 2024-11-25 RX ORDER — METHYLPREDNISOLONE ACETATE 80 MG/ML
80 INJECTION, SUSPENSION INTRA-ARTICULAR; INTRALESIONAL; INTRAMUSCULAR; SOFT TISSUE ONCE
Status: DISCONTINUED | OUTPATIENT
Start: 2024-11-25 | End: 2024-11-25

## 2024-11-25 RX ADMIN — BUPIVACAINE HYDROCHLORIDE 10 ML: 2.5 INJECTION, SOLUTION INFILTRATION; PERINEURAL at 15:10

## 2024-11-25 RX ADMIN — METHYLPREDNISOLONE ACETATE 40 MG: 40 INJECTION, SUSPENSION INTRA-ARTICULAR; INTRALESIONAL; INTRAMUSCULAR; SOFT TISSUE at 15:58

## 2024-11-25 RX ADMIN — METHYLPREDNISOLONE ACETATE 40 MG: 40 INJECTION, SUSPENSION INTRA-ARTICULAR; INTRALESIONAL; INTRAMUSCULAR; SOFT TISSUE at 15:59

## 2024-11-25 RX ADMIN — BUPIVACAINE HYDROCHLORIDE 10 ML: 2.5 INJECTION, SOLUTION INFILTRATION; PERINEURAL at 15:08

## 2024-11-25 NOTE — PROGRESS NOTES
H and P reviewed from previous visit and no changes to patient's clinical presentation. Will proceed with procedure as planned.     Uche Huizar DO  Pain Management   [facility]     PROCEDURE:  Bilateral Trochanteric Bursa Injection     PROCEDURE IN DETAIL:  Patient was placed in the lateral decubitus position with right side up. Two areas of maximum tenderness over the trochanteric bursa was identified, marked and then the area was prepped with antiseptic solution and draped in sterile fashion.  22 gauge 3.5 inch spinal needle was inserted through the skin until the needle touched the trochanteric bursa on the right side.  Then, after negative aspiration, 5 ml mixture of 0.25% Marcaine with 40 mg of Depo-medrol was injected. The needle was then removed.  Repeat procedure was performed on the second area of maximum tenderness on right side. Patient was turned on opposite side with left side up in lateral decubitus position. Entire procedure was repeated on left side with injection to two areas of maximum tenderness. Total 10  ml mixture of 0.25% Marcaine with 40 mg Depo-Medrol was injected on left side.     The patient tolerated the procedure well and a band-Aid was applied and the patient was taken to the recovery room in stable condition      Uche Huizar DO  Pain Management   [facility]

## 2025-01-18 NOTE — PROGRESS NOTES
Subjective   Meliza Red is a 75 y.o. female is here for follow up for lower back pain.  Patient was last seen for bilateral trochanteric bursa injections.  She continues to have lower back pain and bursa pain along with trouble  walking for prolonged period of time consistent with neurogenic claudication symptoms.    On last visit:     Lower back pain is 1/10 on VAS and at worse 9/10 on VAS at bed time. Pain is dull, achy in nature. Pain is referred left buttock, lateral left thigh and left leg. Intermittent numbness and tingling- mostly resolved after caudal INGE. The pain is constant. The pain is improved by meloxicam and caudal INGE but still has trouble walking for long distances. The pain is worse with walking.  Unable to walk for prolonged distance which would improve with bending forward.  + Shopping cart sign    B/l hip/ trochanter bursa pain is 2/10 on VAS. Intermittent, only when she lays down on it at night time.     Previous Injection:   11/25/2024-bilateral trochanteric bursa injection- no pain relief.   9/9/2024-bilateral trochanteric bursa injection-50% pain relief.  Significantly improved pain at nighttime.  Still hurts when she tries to walk 4 out of 10 as compared to 10 out of 10 before.  1/17/24 - b/l troch bursa injection -  90% pain relief for 6 months.  LLE pain resolved as well.  12/1/2023-caudal INGE- 100% pain relief with functional improvement.  Still had trouble walking for prolonged period of time.  9/20/2021-caudal INGE- 100% pain relief for 1.5 years.  8/7/2021-caudal INGE-80% pain relief for 1 month.     PHQ-9- 4  SOAPP- 5     PMH:   Back surgeries x2 (2011) - lumbar disectomy/ decompression L3 -S1; laminectomies L4-5, L5-S1, R hip arthroplasty - 2011,  hypertension, tobacco use, left knee OA, bladder surgery-InterStim, GERD, ulcerative colitis.         Current Medications:   Meloxicam 15 daily PRN  Citalopram      Hx: referred by Dr. Fermin, Ana Harrington MD.  Her pain started about  2 months ago and has been getting worse since then.      Past Medications:   Diclofenac 50 mg twice daily as needed\    Past Modalities:  TENS:                                                                          YES                                               Physical Therapy Within The Last 6 Months              no  Psychotherapy                                                            no  Massage Therapy                                                       no     Patient Complains Of:  Uro-Fecal Incontinence          No (chronic urinary continence)   Weight Gain/Loss                   no  Fever/Chills                             no  Weakness                               no       Current Outpatient Medications:     amLODIPine (NORVASC) 10 MG tablet, TAKE 1 TABLET BY MOUTH DAILY WITH DINNER, Disp: 90 tablet, Rfl: 1    atorvastatin (LIPITOR) 20 MG tablet, TAKE ONE TABLET BY MOUTH ONCE NIGHTLY, Disp: 90 tablet, Rfl: 0    Bromfenac Sodium (Prolensa) 0.07 % solution, Apply 1 drop to eye(s) as directed by provider Every 12 (Twelve) Hours., Disp: , Rfl:     Calcium Carbonate (CALCIUM 600 PO), Take  by mouth., Disp: , Rfl:     carvedilol (COREG) 25 MG tablet, TAKE TWO TABLETS BY MOUTH TWICE A DAY WITH A MEAL, Disp: 360 tablet, Rfl: 1    Cholecalciferol (VITAMIN D3) 125 MCG (5000 UT) tablet tablet, Take 1 tablet by mouth., Disp: , Rfl:     colestipol (COLESTID) 1 g tablet, , Disp: , Rfl:     difluprednate (DUREZOL) 0.05 % ophthalmic emulsion, Apply 1 drop to eye(s) as directed by provider Every 6 (Six) Hours., Disp: , Rfl:     escitalopram (LEXAPRO) 10 MG tablet, TAKE ONE TABLET BY MOUTH DAILY, Disp: 90 tablet, Rfl: 3    folic acid (FOLVITE) 1 MG tablet, Take 1 tablet by mouth Daily., Disp: , Rfl:     gabapentin (NEURONTIN) 300 MG capsule, Take 1 capsule by mouth 3 (Three) Times a Day for 180 days., Disp: 270 capsule, Rfl: 1    KRILL OIL PO, Take  by mouth., Disp: , Rfl:     lisinopril-hydrochlorothiazide  (PRINZIDE,ZESTORETIC) 20-12.5 MG per tablet, TAKE 2 TABLETS BY MOUTH DAILY WITH BREAKFAST, Disp: 180 tablet, Rfl: 1    Milk Thistle 175 MG capsule, , Disp: , Rfl:     multivitamin with minerals tablet tablet, Take 1 tablet by mouth Daily., Disp: , Rfl:     omeprazole (priLOSEC) 20 MG capsule, Take 1 capsule by mouth Daily., Disp: , Rfl:     Risankizumab-rzaa (SKYRIZI SC), Inject  under the skin into the appropriate area as directed., Disp: , Rfl:     Selenium 200 MCG capsule, , Disp: , Rfl:     sulfaSALAzine (AZULFIDINE) 500 MG tablet, Take 2 tablets by mouth 2 (Two) Times a Day., Disp: , Rfl:     tiZANidine (ZANAFLEX) 2 MG tablet, Take 2 tablets by mouth 3 (Three) Times a Day As Needed for Muscle Spasms., Disp: 180 tablet, Rfl: 2    Turmeric 500 MG tablet, Take 1 tablet by mouth Daily., Disp: , Rfl:     Zinc 100 MG tablet, , Disp: , Rfl:     The following portions of the patient's history were reviewed and updated as appropriate: allergies, current medications, past family history, past medical history, past social history, past surgical history, and problem list.      REVIEW OF PERTINENT MEDICAL DATA    Past Medical History:   Diagnosis Date    Allergic     Arthritis 20 hrs ago?    Axonal sensorimotor neuropathy 04/20/2023    Cancer Skin    Cataract 2022    Cervical disc disorder     Chronic pain disorder     Siatic nerve    Colon polyp 20 yrs ago?    CTS (carpal tunnel syndrome)     Difficulty walking     Diverticulosis     Fractures 1994    Broke ankle in 17 inches of snow.    GERD (gastroesophageal reflux disease)     Hepatic hemangioma 10/2017    Seen on MRI of the liver    Hyperlipidemia     Hypertension     Irritable bowel syndrome 2015    Joint pain     Knee - shoulder - hip (replaced in 2011)    Leg pain     Low back pain     Pneumonia     Sleep apnea     Spinal stenosis 2011    Had 2 back surgeries---narrowing at L3-4 junction    Ulcerative colitis 03/2018    Followed by GI, Dr. Vanda Sotomayor; treated  with sulfasalazine    Visual impairment Jan. 12, 2022    Started with double vision.  Ended up having cataracts removed and corrected to mono vision.  Having a lot of problems with jr up vision in left eye.     Past Surgical History:   Procedure Laterality Date    ANKLE SURGERY Right     broken right ankle     BACK SURGERY      BLADDER SURGERY      bladder sling     BREAST AUGMENTATION Bilateral 2015 & 11/28/2017    recurrent capsular contracture    BREAST SURGERY  1984    2013    CATARACT EXTRACTION, BILATERAL  2022    COLONOSCOPY      EPIDURAL BLOCK      Many times    EYE SURGERY      FRACTURE SURGERY  1993    INTERSTIM PLACEMENT Right 11/2020    for bladder and bowel control - [employer]    JOINT REPLACEMENT      ORTHOPEDIC SURGERY  2011    Hip replacement    SKIN BIOPSY  02/06/2019    Shave biopsy of frontal scalp; irritated verruca vulgaris    SPINE SURGERY  2011    TOE SURGERY      big toe left foot     TOTAL HIP ARTHROPLASTY Right     TRIGGER POINT INJECTION  2024    For bursa    TUBAL ABDOMINAL LIGATION Bilateral     UPPER ENDOSCOPIC ULTRASOUND W/ FNA N/A 11/27/2023    Procedure: ESOPHAGOGASTRODUODENOSCOPY WITH BIOPSY X2 AREAS AND ENDOSCOPIC ULTRASOUND WITH LIVER BIOPSY;  Surgeon: Rolando Summers MD;  Location: [facility] ENDOSCOPY;  Service: Gastroenterology;  Laterality: N/A;  POST: DUODENAL AND PERIAMPULLARY DIVERTICULUM     Family History   Problem Relation Age of Onset    Hypertension Mother     Arthritis Mother     Stroke Mother     Dementia Mother     Hypertension Father     Arthritis Father     Hyperlipidemia Father     Stroke Father     Vision loss Father     Dementia Father     Parkinsonism Father     Colon cancer Sister 43    Cancer Sister         Colon    Depression Son     Hearing loss Son     Hyperlipidemia Sister     Hypertension Sister     Hyperlipidemia Brother     Hypertension Brother      Social History     Socioeconomic History    Marital status:    Tobacco Use    Smoking status:  Former     Current packs/day: 0.00     Average packs/day: 1 pack/day for 33.8 years (33.8 ttl pk-yrs)     Types: Cigarettes     Start date: 1980     Quit date: 10/13/2013     Years since quittin.2    Smokeless tobacco: Never   Vaping Use    Vaping status: Never Used   Substance and Sexual Activity    Alcohol use: Yes     Alcohol/week: 20.0 standard drinks of alcohol     Types: 20 Shots of liquor per week     Comment: bourbon- couple daily     Drug use: Never    Sexual activity: Yes     Partners: Male     Birth control/protection: Post-menopausal, Tubal ligation         Review of Systems   Musculoskeletal:  Positive for arthralgias and back pain.         Vitals:    25 1433   BP: 136/72   Pulse: 75   Resp: 16   SpO2: 94%   Weight: 82.1 kg (181 lb)   PainSc:   1                       Objective   Physical Exam  Musculoskeletal:         General: Tenderness present.        Legs:            Imaging Reviewed:    MRI lumbar spine- 10/3/23-   S/p L4, L5 laminectomies  Modic type I degenerative changes  - L3-5 - mild spinal canal narrowing and moderate to severe b/l foraminal narrowing.   - L4-5 - facet arthropathy causing modecate to severe b/l neural foraminal narrowing. S/p parker  L5-S1 - facet arthropathy causing modecate to severe b/l neural foraminal narrowing. S/p parker      CT lumbar spine-2021-independently reviewed  P52-L7-mo central stenosis or facet changes.  L3-L4 -facet arthritis. No stenosis  N4-E0-zulkoxvrz neuroforaminal narrowing. Greater than left due to spurring and facet arthritis. There may be nerve impingement especially in the right side.  S3-X2-kbqnnlydt neuroforaminal narrowing secondary to prominent endplate spurring. Mild facet arthritis. No spinal stenosis.     Assessment:    1. Hx of decompressive lumbar laminectomy    2. Post laminectomy syndrome    3. Lumbar radiculopathy    4. Trochanteric bursitis of both hips        Plan:   1. Defer UDS for now.   2. We discussed trying a  course of formal physical therapy.  Physical therapy can help strengthen and stretch the muscles around the joints. Continue to be as active as possible.   3. Good relief with caudal INGE. Repeat PRN.   4. Continue gabapentin 300 mg BID (9/16/24). Side effects discussed with the patient including but not limited to somnolence, dizziness, ataxia, headache, fatigue, blurred vision, cold or flu-like symptoms,delusions, hoarseness, lack or loss of strength, lower back or side pain, swelling of the hands, feet, or lower legs trembling or shaking. Patient understands and agrees with the plan.  5. Voltaren gel 1% for b/l troch bursa.   6 Patient has pain in the lower back with referred pain in the legs.  Her symptoms are consistent with neurogenic claudication.  She has undergone 2 surgeries in the past and not interested in proceeding with another 1.  She had multiple procedures and back surgery in the past with no improvement in pain.  She is not a candidate for repeat surgery and has failed more than 6 months of conservative therapy and there are no contraindications for SCS trial. So he is good candidate for SCS trial. [employer]. Psych eval sent- 6/17/24.  Information given regarding [employer] and [employer] SCS.  7.  Good relief with bilateral trochanteric bursa injection.  Repeat as needed.  8.  Continue tizanidine 2 mg nightly as needed.    RTC as needed.     Uche Huizar DO  Pain Management   [facility]          INSPECT REPORT    As part of the patient's treatment plan, I may be prescribing controlled substances. The patient has been made aware of appropriate use of such medications, including potential risk of somnolence, limited ability to drive and/or work safely, and the potential for dependence or overdose. It has also been made clear that these medications are for use by this patient only, without concomitant use of alcohol or other substances unless prescribed.     Patient has completed prescribing agreement  detailing terms of continued prescribing of controlled substances, including monitoring INSPECT reports, urine drug screening, and pill counts if necessary. The patient is aware that inappropriate use will results in cessation of prescribing such medications.    INSPECT report has been reviewed and scanned into the patient's chart.

## 2025-01-20 ENCOUNTER — OFFICE VISIT (OUTPATIENT)
Dept: PAIN MEDICINE | Facility: CLINIC | Age: 75
End: 2025-01-20
Payer: MEDICARE

## 2025-01-20 VITALS
RESPIRATION RATE: 16 BRPM | BODY MASS INDEX: 30.12 KG/M2 | WEIGHT: 181 LBS | DIASTOLIC BLOOD PRESSURE: 72 MMHG | OXYGEN SATURATION: 94 % | HEART RATE: 75 BPM | SYSTOLIC BLOOD PRESSURE: 136 MMHG

## 2025-01-20 DIAGNOSIS — M54.16 LUMBAR RADICULOPATHY: ICD-10-CM

## 2025-01-20 DIAGNOSIS — M70.62 TROCHANTERIC BURSITIS OF BOTH HIPS: ICD-10-CM

## 2025-01-20 DIAGNOSIS — M70.61 TROCHANTERIC BURSITIS OF BOTH HIPS: ICD-10-CM

## 2025-01-20 DIAGNOSIS — Z98.890 HX OF DECOMPRESSIVE LUMBAR LAMINECTOMY: Primary | ICD-10-CM

## 2025-01-20 DIAGNOSIS — M96.1 POST LAMINECTOMY SYNDROME: ICD-10-CM

## 2025-01-20 PROCEDURE — 3075F SYST BP GE 130 - 139MM HG: CPT | Performed by: STUDENT IN AN ORGANIZED HEALTH CARE EDUCATION/TRAINING PROGRAM

## 2025-01-20 PROCEDURE — 3078F DIAST BP <80 MM HG: CPT | Performed by: STUDENT IN AN ORGANIZED HEALTH CARE EDUCATION/TRAINING PROGRAM

## 2025-01-20 PROCEDURE — 1159F MED LIST DOCD IN RCRD: CPT | Performed by: STUDENT IN AN ORGANIZED HEALTH CARE EDUCATION/TRAINING PROGRAM

## 2025-01-20 PROCEDURE — 1125F AMNT PAIN NOTED PAIN PRSNT: CPT | Performed by: STUDENT IN AN ORGANIZED HEALTH CARE EDUCATION/TRAINING PROGRAM

## 2025-01-20 PROCEDURE — 99214 OFFICE O/P EST MOD 30 MIN: CPT | Performed by: STUDENT IN AN ORGANIZED HEALTH CARE EDUCATION/TRAINING PROGRAM

## 2025-01-20 PROCEDURE — 1160F RVW MEDS BY RX/DR IN RCRD: CPT | Performed by: STUDENT IN AN ORGANIZED HEALTH CARE EDUCATION/TRAINING PROGRAM

## 2025-03-10 ENCOUNTER — OFFICE (AMBULATORY)
Dept: URBAN - METROPOLITAN AREA PATHOLOGY 19 | Facility: PATHOLOGY | Age: 75
End: 2025-03-10
Payer: MEDICARE

## 2025-03-10 ENCOUNTER — ON CAMPUS - OUTPATIENT (AMBULATORY)
Dept: URBAN - METROPOLITAN AREA HOSPITAL 2 | Facility: HOSPITAL | Age: 75
End: 2025-03-10
Payer: MEDICARE

## 2025-03-10 ENCOUNTER — OFFICE (AMBULATORY)
Dept: URBAN - METROPOLITAN AREA PATHOLOGY 19 | Facility: PATHOLOGY | Age: 75
End: 2025-03-10
Payer: COMMERCIAL

## 2025-03-10 VITALS
DIASTOLIC BLOOD PRESSURE: 90 MMHG | SYSTOLIC BLOOD PRESSURE: 176 MMHG | RESPIRATION RATE: 16 BRPM | OXYGEN SATURATION: 98 % | DIASTOLIC BLOOD PRESSURE: 68 MMHG | SYSTOLIC BLOOD PRESSURE: 144 MMHG | OXYGEN SATURATION: 99 % | HEIGHT: 65 IN | HEART RATE: 77 BPM | SYSTOLIC BLOOD PRESSURE: 149 MMHG | HEART RATE: 64 BPM | HEART RATE: 87 BPM | SYSTOLIC BLOOD PRESSURE: 137 MMHG | HEART RATE: 81 BPM | SYSTOLIC BLOOD PRESSURE: 140 MMHG | DIASTOLIC BLOOD PRESSURE: 69 MMHG | WEIGHT: 177 LBS | OXYGEN SATURATION: 95 % | DIASTOLIC BLOOD PRESSURE: 77 MMHG | DIASTOLIC BLOOD PRESSURE: 75 MMHG | OXYGEN SATURATION: 96 % | RESPIRATION RATE: 18 BRPM | RESPIRATION RATE: 14 BRPM | SYSTOLIC BLOOD PRESSURE: 132 MMHG | HEART RATE: 66 BPM | HEART RATE: 67 BPM | HEART RATE: 80 BPM | SYSTOLIC BLOOD PRESSURE: 153 MMHG | DIASTOLIC BLOOD PRESSURE: 78 MMHG | HEART RATE: 79 BPM | TEMPERATURE: 96.9 F | SYSTOLIC BLOOD PRESSURE: 160 MMHG | HEART RATE: 75 BPM

## 2025-03-10 DIAGNOSIS — K51.90 ULCERATIVE COLITIS, UNSPECIFIED, WITHOUT COMPLICATIONS: ICD-10-CM

## 2025-03-10 DIAGNOSIS — K51.50 LEFT SIDED COLITIS WITHOUT COMPLICATIONS: ICD-10-CM

## 2025-03-10 DIAGNOSIS — K57.30 DIVERTICULOSIS OF LARGE INTESTINE WITHOUT PERFORATION OR ABS: ICD-10-CM

## 2025-03-10 LAB
GI HISTOLOGY: A. CECUM: (no result)
GI HISTOLOGY: B. ASCENDING COLON: (no result)
GI HISTOLOGY: C. TRANSVERSE COLON: (no result)
GI HISTOLOGY: D. DESCENDING COLON: (no result)
GI HISTOLOGY: E. SIGMOID COLON: (no result)
GI HISTOLOGY: PDF REPORT: (no result)

## 2025-03-10 PROCEDURE — 88305 TISSUE EXAM BY PATHOLOGIST: CPT | Mod: 26 | Performed by: PATHOLOGY

## 2025-03-10 PROCEDURE — 45380 COLONOSCOPY AND BIOPSY: CPT | Mod: PT | Performed by: INTERNAL MEDICINE

## 2025-06-12 DIAGNOSIS — M54.16 LUMBAR RADICULOPATHY: ICD-10-CM

## 2025-06-12 DIAGNOSIS — M70.62 TROCHANTERIC BURSITIS OF BOTH HIPS: ICD-10-CM

## 2025-06-12 DIAGNOSIS — M70.61 TROCHANTERIC BURSITIS OF BOTH HIPS: ICD-10-CM

## 2025-06-12 DIAGNOSIS — Z98.890 HX OF DECOMPRESSIVE LUMBAR LAMINECTOMY: ICD-10-CM

## 2025-06-12 RX ORDER — GABAPENTIN 300 MG/1
300 CAPSULE ORAL 3 TIMES DAILY
Qty: 270 CAPSULE | Refills: 1 | OUTPATIENT
Start: 2025-06-12

## (undated) DEVICE — PK ENDO GI 50

## (undated) DEVICE — BITEBLOCK ENDO W/STRAP 60F A/ LF DISP

## (undated) DEVICE — SINGLE-USE BIOPSY FORCEPS: Brand: RADIAL JAW 4

## (undated) DEVICE — ENDOSCOPIC ULTRASOUND FINE NEEDLE BIOPSY (FNB) DEVICE: Brand: ACQUIRE